# Patient Record
Sex: FEMALE | Race: WHITE | NOT HISPANIC OR LATINO | Employment: OTHER | URBAN - METROPOLITAN AREA
[De-identification: names, ages, dates, MRNs, and addresses within clinical notes are randomized per-mention and may not be internally consistent; named-entity substitution may affect disease eponyms.]

---

## 2020-09-16 ENCOUNTER — LAB VISIT (OUTPATIENT)
Dept: LAB | Facility: HOSPITAL | Age: 70
End: 2020-09-16
Attending: FAMILY MEDICINE
Payer: COMMERCIAL

## 2020-09-16 ENCOUNTER — OFFICE VISIT (OUTPATIENT)
Dept: INTERNAL MEDICINE | Facility: CLINIC | Age: 70
End: 2020-09-16
Payer: COMMERCIAL

## 2020-09-16 VITALS
TEMPERATURE: 98 F | BODY MASS INDEX: 28.17 KG/M2 | WEIGHT: 143.5 LBS | SYSTOLIC BLOOD PRESSURE: 112 MMHG | HEART RATE: 80 BPM | DIASTOLIC BLOOD PRESSURE: 78 MMHG | HEIGHT: 60 IN

## 2020-09-16 DIAGNOSIS — E11.9 CONTROLLED TYPE 2 DIABETES MELLITUS WITHOUT COMPLICATION, WITHOUT LONG-TERM CURRENT USE OF INSULIN: Primary | ICD-10-CM

## 2020-09-16 DIAGNOSIS — E11.69 DYSLIPIDEMIA ASSOCIATED WITH TYPE 2 DIABETES MELLITUS: ICD-10-CM

## 2020-09-16 DIAGNOSIS — E11.9 CONTROLLED TYPE 2 DIABETES MELLITUS WITHOUT COMPLICATION, WITHOUT LONG-TERM CURRENT USE OF INSULIN: ICD-10-CM

## 2020-09-16 DIAGNOSIS — Z12.39 BREAST CANCER SCREENING: ICD-10-CM

## 2020-09-16 DIAGNOSIS — E78.5 DYSLIPIDEMIA ASSOCIATED WITH TYPE 2 DIABETES MELLITUS: ICD-10-CM

## 2020-09-16 DIAGNOSIS — E55.9 VITAMIN D DEFICIENCY: ICD-10-CM

## 2020-09-16 DIAGNOSIS — Z11.59 NEED FOR HEPATITIS C SCREENING TEST: ICD-10-CM

## 2020-09-16 LAB
ALBUMIN/CREAT UR: 17.3 UG/MG (ref 0–30)
CREAT UR-MCNC: 139 MG/DL (ref 15–325)
MICROALBUMIN UR DL<=1MG/L-MCNC: 24 UG/ML

## 2020-09-16 PROCEDURE — 1100F PR PT FALLS ASSESS DOC 2+ FALLS/FALL W/INJURY/YR: ICD-10-PCS | Mod: CPTII,S$GLB,, | Performed by: FAMILY MEDICINE

## 2020-09-16 PROCEDURE — 99999 PR PBB SHADOW E&M-NEW PATIENT-LVL IV: ICD-10-PCS | Mod: PBBFAC,,, | Performed by: FAMILY MEDICINE

## 2020-09-16 PROCEDURE — 1159F PR MEDICATION LIST DOCUMENTED IN MEDICAL RECORD: ICD-10-PCS | Mod: S$GLB,,, | Performed by: FAMILY MEDICINE

## 2020-09-16 PROCEDURE — 82043 UR ALBUMIN QUANTITATIVE: CPT

## 2020-09-16 PROCEDURE — 99204 PR OFFICE/OUTPT VISIT, NEW, LEVL IV, 45-59 MIN: ICD-10-PCS | Mod: S$GLB,,, | Performed by: FAMILY MEDICINE

## 2020-09-16 PROCEDURE — 3288F PR FALLS RISK ASSESSMENT DOCUMENTED: ICD-10-PCS | Mod: CPTII,S$GLB,, | Performed by: FAMILY MEDICINE

## 2020-09-16 PROCEDURE — 3008F BODY MASS INDEX DOCD: CPT | Mod: CPTII,S$GLB,, | Performed by: FAMILY MEDICINE

## 2020-09-16 PROCEDURE — 1125F PR PAIN SEVERITY QUANTIFIED, PAIN PRESENT: ICD-10-PCS | Mod: S$GLB,,, | Performed by: FAMILY MEDICINE

## 2020-09-16 PROCEDURE — 1159F MED LIST DOCD IN RCRD: CPT | Mod: S$GLB,,, | Performed by: FAMILY MEDICINE

## 2020-09-16 PROCEDURE — 3008F PR BODY MASS INDEX (BMI) DOCUMENTED: ICD-10-PCS | Mod: CPTII,S$GLB,, | Performed by: FAMILY MEDICINE

## 2020-09-16 PROCEDURE — 99999 PR PBB SHADOW E&M-NEW PATIENT-LVL IV: CPT | Mod: PBBFAC,,, | Performed by: FAMILY MEDICINE

## 2020-09-16 PROCEDURE — 3288F FALL RISK ASSESSMENT DOCD: CPT | Mod: CPTII,S$GLB,, | Performed by: FAMILY MEDICINE

## 2020-09-16 PROCEDURE — 1125F AMNT PAIN NOTED PAIN PRSNT: CPT | Mod: S$GLB,,, | Performed by: FAMILY MEDICINE

## 2020-09-16 PROCEDURE — 1100F PTFALLS ASSESS-DOCD GE2>/YR: CPT | Mod: CPTII,S$GLB,, | Performed by: FAMILY MEDICINE

## 2020-09-16 PROCEDURE — 99204 OFFICE O/P NEW MOD 45 MIN: CPT | Mod: S$GLB,,, | Performed by: FAMILY MEDICINE

## 2020-09-16 RX ORDER — ATORVASTATIN CALCIUM 20 MG/1
20 TABLET, FILM COATED ORAL NIGHTLY
Qty: 90 TABLET | Refills: 3 | Status: SHIPPED | OUTPATIENT
Start: 2020-09-16 | End: 2021-09-22

## 2020-09-16 RX ORDER — ATORVASTATIN CALCIUM 20 MG/1
20 TABLET, FILM COATED ORAL NIGHTLY
COMMUNITY
End: 2020-09-16 | Stop reason: SDUPTHER

## 2020-09-16 RX ORDER — TRAZODONE HYDROCHLORIDE 100 MG/1
100 TABLET ORAL NIGHTLY PRN
Qty: 90 TABLET | Refills: 3 | Status: SHIPPED | OUTPATIENT
Start: 2020-09-16 | End: 2021-04-20 | Stop reason: SDUPTHER

## 2020-09-16 RX ORDER — REPAGLINIDE 0.5 MG/1
0.5 TABLET ORAL
Qty: 270 TABLET | Refills: 3 | Status: SHIPPED | OUTPATIENT
Start: 2020-09-16 | End: 2021-03-16

## 2020-09-16 RX ORDER — TRAZODONE HYDROCHLORIDE 100 MG/1
100 TABLET ORAL NIGHTLY
COMMUNITY
End: 2020-09-16 | Stop reason: SDUPTHER

## 2020-09-16 RX ORDER — REPAGLINIDE 0.5 MG/1
0.5 TABLET ORAL
COMMUNITY
End: 2020-09-16 | Stop reason: SDUPTHER

## 2020-09-16 NOTE — PROGRESS NOTES
Subjective:      Patient ID: Josiah Perkins is a 70 y.o. female.    Chief Complaint:  DM/dyslipidemia    HPI  69 yo with DM, dyslipidemia, sleep disturbance here to establish care.  Moved from [place].  Has been stable on meds, last a1C in 6 range.  She needs updated labs, Mammo, eye exam and med refills.  She is feeling well, no problems/concerns.  Help to care for her mother who is 90 and has Alzheimers with sudden onset/worsening.  No CP/SOB  Has some gallbladder dysfunction and will occ have loose stool/increase gas depending on what she eats.  Planning to start walking more/getting more active once she is settled in her home.    Past Medical History:   Diagnosis Date    Diabetes mellitus, type 2     Hyperlipidemia      Family History   Problem Relation Age of Onset    Alzheimer's disease Mother     Hyperlipidemia Mother     Heart disease Maternal Grandmother     Colon cancer Maternal Grandfather     Heart disease Paternal Grandmother     Thyroid cancer Sister     Heart failure Sister          at 42    Hodgkin's lymphoma Sister     Breast cancer Maternal Aunt     Breast cancer Maternal Aunt     Diabetes Neg Hx      Past Surgical History:   Procedure Laterality Date    APPENDECTOMY       SECTION      x 2    HYSTERECTOMY      SINUS SURGERY      TONSILLECTOMY       Social History     Tobacco Use    Smoking status: Never Smoker    Smokeless tobacco: Never Used   Substance Use Topics    Alcohol use: Yes     Frequency: Monthly or less     Drinks per session: 1 or 2    Drug use: Never       /78   Pulse 80   Temp 97.7 °F (36.5 °C) (Temporal)   Ht 5' (1.524 m)   Wt 65.1 kg (143 lb 8.3 oz)   BMI 28.03 kg/m²     Review of Systems   Constitutional: Negative for activity change, appetite change, chills, diaphoresis, fatigue, fever and unexpected weight change.   HENT: Negative for ear pain, hearing loss, postnasal drip, rhinorrhea and tinnitus.    Eyes: Negative for visual disturbance.    Respiratory: Negative for cough, shortness of breath and wheezing.    Cardiovascular: Negative for chest pain, palpitations and leg swelling.   Gastrointestinal: Negative for abdominal distention and abdominal pain.        Loose stool/gas   Genitourinary: Negative for dysuria, frequency, hematuria and urgency.   Musculoskeletal: Negative for back pain and joint swelling.   Neurological: Negative for weakness and headaches.   Hematological: Negative for adenopathy.       Objective:     Physical Exam  Vitals signs and nursing note reviewed.   Constitutional:       General: She is not in acute distress.     Appearance: She is well-developed.   HENT:      Right Ear: External ear normal.      Left Ear: External ear normal.      Nose: Nose normal.   Eyes:      Conjunctiva/sclera: Conjunctivae normal.      Pupils: Pupils are equal, round, and reactive to light.   Neck:      Musculoskeletal: Normal range of motion and neck supple.      Thyroid: No thyromegaly.   Cardiovascular:      Rate and Rhythm: Normal rate and regular rhythm.      Pulses:           Dorsalis pedis pulses are 3+ on the right side and 3+ on the left side.        Posterior tibial pulses are 3+ on the right side and 3+ on the left side.      Heart sounds: Normal heart sounds. No murmur. No gallop.    Pulmonary:      Effort: Pulmonary effort is normal. No respiratory distress.      Breath sounds: Normal breath sounds. No wheezing or rales.   Abdominal:      General: Bowel sounds are normal. There is no distension.      Palpations: Abdomen is soft.      Tenderness: There is no abdominal tenderness. There is no guarding.   Musculoskeletal:      Right lower leg: No edema.      Left lower leg: No edema.   Feet:      Right foot:      Protective Sensation: 6 sites tested. 6 sites sensed.      Skin integrity: No ulcer, blister or skin breakdown.      Left foot:      Protective Sensation: 6 sites tested. 6 sites sensed.      Skin integrity: No ulcer, blister or  skin breakdown.   Skin:     General: Skin is warm and dry.      Findings: No rash.   Neurological:      Mental Status: She is alert and oriented to person, place, and time.      Cranial Nerves: No cranial nerve deficit.      Sensory: No sensory deficit.   Psychiatric:         Mood and Affect: Mood normal.         Behavior: Behavior normal.         Thought Content: Thought content normal.         Judgment: Judgment normal.         No results found for: WBC, HGB, HCT, PLT, CHOL, TRIG, HDL, LDLDIRECT, ALT, AST, NA, K, CL, CREATININE, BUN, CO2, TSH, PSA, INR, GLUF, HGBA1C, MICROALBUR    Assessment:     1. Controlled type 2 diabetes mellitus without complication, without long-term current use of insulin    2. Dyslipidemia associated with type 2 diabetes mellitus    3. Breast cancer screening    4. Need for hepatitis C screening test    5. Vitamin D deficiency         Plan:     Controlled type 2 diabetes mellitus without complication, without long-term current use of insulin  -     Hemoglobin A1C; Future; Expected date: 09/16/2020  -     Microalbumin/creatinine urine ratio; Future; Expected date: 09/16/2020  -     Ambulatory referral/consult to Optometry; Future; Expected date: 09/23/2020    Dyslipidemia associated with type 2 diabetes mellitus  -     CBC auto differential; Future; Expected date: 09/16/2020  -     Comprehensive metabolic panel; Future; Expected date: 09/16/2020  -     Lipid Panel; Future; Expected date: 09/16/2020  -     TSH; Future; Expected date: 09/16/2020    Breast cancer screening  -     Mammo Digital Screening Bilat w/ Shahid; Future; Expected date: 09/16/2020    Need for hepatitis C screening test  -     Hepatitis C Antibody; Future; Expected date: 09/16/2020    Vitamin D deficiency  -     Vitamin D; Future; Expected date: 09/16/2020    Other orders  -     atorvastatin (LIPITOR) 20 MG tablet; Take 1 tablet (20 mg total) by mouth every evening.  Dispense: 90 tablet; Refill: 3  -     traZODone  (DESYREL) 100 MG tablet; Take 1 tablet (100 mg total) by mouth nightly as needed for Insomnia.  Dispense: 90 tablet; Refill: 3  -     repaglinide (PRANDIN) 0.5 MG tablet; Take 1 tablet (0.5 mg total) by mouth 3 (three) times daily before meals.  Dispense: 270 tablet; Refill: 3      Update labs  BP normal//no ACE/ARB.  Will check urine cr/abl  Med refills  Update eye exam and mammo  May need DEXA  F/u 6 mos  Get records of shots/colonoscopy

## 2020-09-17 ENCOUNTER — LAB VISIT (OUTPATIENT)
Dept: LAB | Facility: HOSPITAL | Age: 70
End: 2020-09-17
Attending: FAMILY MEDICINE
Payer: COMMERCIAL

## 2020-09-17 DIAGNOSIS — Z11.59 NEED FOR HEPATITIS C SCREENING TEST: ICD-10-CM

## 2020-09-17 DIAGNOSIS — E78.5 DYSLIPIDEMIA ASSOCIATED WITH TYPE 2 DIABETES MELLITUS: ICD-10-CM

## 2020-09-17 DIAGNOSIS — E11.69 DYSLIPIDEMIA ASSOCIATED WITH TYPE 2 DIABETES MELLITUS: ICD-10-CM

## 2020-09-17 DIAGNOSIS — E55.9 VITAMIN D DEFICIENCY: ICD-10-CM

## 2020-09-17 DIAGNOSIS — E11.9 CONTROLLED TYPE 2 DIABETES MELLITUS WITHOUT COMPLICATION, WITHOUT LONG-TERM CURRENT USE OF INSULIN: ICD-10-CM

## 2020-09-17 LAB
25(OH)D3+25(OH)D2 SERPL-MCNC: 33 NG/ML (ref 30–96)
ALBUMIN SERPL BCP-MCNC: 4.3 G/DL (ref 3.5–5.2)
ALP SERPL-CCNC: 90 U/L (ref 55–135)
ALT SERPL W/O P-5'-P-CCNC: 27 U/L (ref 10–44)
ANION GAP SERPL CALC-SCNC: 11 MMOL/L (ref 8–16)
AST SERPL-CCNC: 22 U/L (ref 10–40)
BASOPHILS # BLD AUTO: 0.06 K/UL (ref 0–0.2)
BASOPHILS NFR BLD: 0.6 % (ref 0–1.9)
BILIRUB SERPL-MCNC: 0.5 MG/DL (ref 0.1–1)
BUN SERPL-MCNC: 14 MG/DL (ref 8–23)
CALCIUM SERPL-MCNC: 9.4 MG/DL (ref 8.7–10.5)
CHLORIDE SERPL-SCNC: 102 MMOL/L (ref 95–110)
CHOLEST SERPL-MCNC: 168 MG/DL (ref 120–199)
CHOLEST/HDLC SERPL: 2.7 {RATIO} (ref 2–5)
CO2 SERPL-SCNC: 26 MMOL/L (ref 23–29)
CREAT SERPL-MCNC: 1 MG/DL (ref 0.5–1.4)
DIFFERENTIAL METHOD: ABNORMAL
EOSINOPHIL # BLD AUTO: 0.2 K/UL (ref 0–0.5)
EOSINOPHIL NFR BLD: 2.1 % (ref 0–8)
ERYTHROCYTE [DISTWIDTH] IN BLOOD BY AUTOMATED COUNT: 12.6 % (ref 11.5–14.5)
EST. GFR  (AFRICAN AMERICAN): >60 ML/MIN/1.73 M^2
EST. GFR  (NON AFRICAN AMERICAN): 57.6 ML/MIN/1.73 M^2
GLUCOSE SERPL-MCNC: 183 MG/DL (ref 70–110)
HCT VFR BLD AUTO: 42.9 % (ref 37–48.5)
HDLC SERPL-MCNC: 63 MG/DL (ref 40–75)
HDLC SERPL: 37.5 % (ref 20–50)
HGB BLD-MCNC: 13.7 G/DL (ref 12–16)
IMM GRANULOCYTES # BLD AUTO: 0.02 K/UL (ref 0–0.04)
IMM GRANULOCYTES NFR BLD AUTO: 0.2 % (ref 0–0.5)
LDLC SERPL CALC-MCNC: 74.4 MG/DL (ref 63–159)
LYMPHOCYTES # BLD AUTO: 5 K/UL (ref 1–4.8)
LYMPHOCYTES NFR BLD: 54 % (ref 18–48)
MCH RBC QN AUTO: 31.1 PG (ref 27–31)
MCHC RBC AUTO-ENTMCNC: 31.9 G/DL (ref 32–36)
MCV RBC AUTO: 98 FL (ref 82–98)
MONOCYTES # BLD AUTO: 0.7 K/UL (ref 0.3–1)
MONOCYTES NFR BLD: 7.9 % (ref 4–15)
NEUTROPHILS # BLD AUTO: 3.3 K/UL (ref 1.8–7.7)
NEUTROPHILS NFR BLD: 35.2 % (ref 38–73)
NONHDLC SERPL-MCNC: 105 MG/DL
NRBC BLD-RTO: 0 /100 WBC
PLATELET # BLD AUTO: 275 K/UL (ref 150–350)
PMV BLD AUTO: 11 FL (ref 9.2–12.9)
POTASSIUM SERPL-SCNC: 4.4 MMOL/L (ref 3.5–5.1)
PROT SERPL-MCNC: 7.2 G/DL (ref 6–8.4)
RBC # BLD AUTO: 4.4 M/UL (ref 4–5.4)
SODIUM SERPL-SCNC: 139 MMOL/L (ref 136–145)
TRIGL SERPL-MCNC: 153 MG/DL (ref 30–150)
TSH SERPL DL<=0.005 MIU/L-ACNC: 3.1 UIU/ML (ref 0.4–4)
WBC # BLD AUTO: 9.24 K/UL (ref 3.9–12.7)

## 2020-09-17 PROCEDURE — 80061 LIPID PANEL: CPT

## 2020-09-17 PROCEDURE — 86803 HEPATITIS C AB TEST: CPT

## 2020-09-17 PROCEDURE — 82306 VITAMIN D 25 HYDROXY: CPT

## 2020-09-17 PROCEDURE — 36415 COLL VENOUS BLD VENIPUNCTURE: CPT | Mod: PO

## 2020-09-17 PROCEDURE — 80053 COMPREHEN METABOLIC PANEL: CPT

## 2020-09-17 PROCEDURE — 84443 ASSAY THYROID STIM HORMONE: CPT

## 2020-09-17 PROCEDURE — 85025 COMPLETE CBC W/AUTO DIFF WBC: CPT

## 2020-09-17 PROCEDURE — 83036 HEMOGLOBIN GLYCOSYLATED A1C: CPT

## 2020-09-18 ENCOUNTER — PATIENT MESSAGE (OUTPATIENT)
Dept: INTERNAL MEDICINE | Facility: CLINIC | Age: 70
End: 2020-09-18

## 2020-09-18 DIAGNOSIS — N18.30 CKD (CHRONIC KIDNEY DISEASE) STAGE 3, GFR 30-59 ML/MIN: ICD-10-CM

## 2020-09-18 DIAGNOSIS — E11.65 UNCONTROLLED TYPE 2 DIABETES MELLITUS WITH HYPERGLYCEMIA: Primary | ICD-10-CM

## 2020-09-18 LAB
ESTIMATED AVG GLUCOSE: 169 MG/DL (ref 68–131)
HBA1C MFR BLD HPLC: 7.5 % (ref 4–5.6)
HCV AB SERPL QL IA: NEGATIVE

## 2020-09-24 ENCOUNTER — PATIENT MESSAGE (OUTPATIENT)
Dept: INTERNAL MEDICINE | Facility: CLINIC | Age: 70
End: 2020-09-24

## 2020-09-25 ENCOUNTER — PATIENT OUTREACH (OUTPATIENT)
Dept: ADMINISTRATIVE | Facility: HOSPITAL | Age: 70
End: 2020-09-25

## 2020-09-25 NOTE — PROGRESS NOTES
PARKER uploaded and faxed to [facility] [phone] for Dexa Scan & colonoscopy.

## 2020-10-07 ENCOUNTER — OFFICE VISIT (OUTPATIENT)
Dept: OPHTHALMOLOGY | Facility: CLINIC | Age: 70
End: 2020-10-07
Payer: COMMERCIAL

## 2020-10-07 DIAGNOSIS — E11.9 CONTROLLED TYPE 2 DIABETES MELLITUS WITHOUT COMPLICATION, WITHOUT LONG-TERM CURRENT USE OF INSULIN: Primary | ICD-10-CM

## 2020-10-07 DIAGNOSIS — H43.812 PVD (POSTERIOR VITREOUS DETACHMENT), LEFT EYE: ICD-10-CM

## 2020-10-07 DIAGNOSIS — Z96.1 PSEUDOPHAKIA OF BOTH EYES: ICD-10-CM

## 2020-10-07 DIAGNOSIS — H52.7 REFRACTIVE DISORDER: ICD-10-CM

## 2020-10-07 PROCEDURE — 99999 PR PBB SHADOW E&M-EST. PATIENT-LVL III: ICD-10-PCS | Mod: PBBFAC,,, | Performed by: STUDENT IN AN ORGANIZED HEALTH CARE EDUCATION/TRAINING PROGRAM

## 2020-10-07 PROCEDURE — 92004 PR EYE EXAM, NEW PATIENT,COMPREHESV: ICD-10-PCS | Mod: S$GLB,,, | Performed by: STUDENT IN AN ORGANIZED HEALTH CARE EDUCATION/TRAINING PROGRAM

## 2020-10-07 PROCEDURE — 92004 COMPRE OPH EXAM NEW PT 1/>: CPT | Mod: S$GLB,,, | Performed by: STUDENT IN AN ORGANIZED HEALTH CARE EDUCATION/TRAINING PROGRAM

## 2020-10-07 PROCEDURE — 99999 PR PBB SHADOW E&M-EST. PATIENT-LVL III: CPT | Mod: PBBFAC,,, | Performed by: STUDENT IN AN ORGANIZED HEALTH CARE EDUCATION/TRAINING PROGRAM

## 2020-10-07 NOTE — LETTER
October 7, 2020      Odell Reyes MD  [id number] [place] [id number]           [facility]  [address]  [place] [address]  Phone: [phone]  Fax: [phone]          Patient: Josiah Perkins   MR Number: [id number]   YOB: 1950   Date of Visit: 10/7/2020       Dear Dr. Odell Reyes:    Thank you for referring Josiah Perkins to me for evaluation. Attached you will find relevant portions of my assessment and plan of care.    If you have questions, please do not hesitate to call me. I look forward to following Josiah Perkins along with you.    Sincerely,    Victoria Lacy MD    Enclosure  CC:  No Recipients    If you would like to receive this communication electronically, please contact [email or web address] or [phone] to request more information on [facility] access.    For providers and/or their staff who would like to refer a patient to [facility], please contact us through our one-stop-shop provider referral line, [facility], at [phone].    If you feel you have received this communication in error or would no longer like to receive these types of communications, please e-mail [email or web address]

## 2020-10-07 NOTE — PROGRESS NOTES
HPI     The patient states she is here for her annual DM eye exam. The patients   last A1C was 7.5. The patient states her eyes are doing well.    NP- Referred by     1. +DM  2. Refractive Error    Last edited by Brittaney Powell on 10/7/2020  1:40 PM. (History)            Assessment /Plan     For exam results, see Encounter Report.    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 No tears or breaks were seen after careful retinal evaluation. Discussed retinal detachment signs and symptoms including flashes of lights, floaters, perceived curtains or veils. Advised to patient to monitor visual status including increase in flashes and floaters, or the development of visual field changes including curtain and /or veils. Advised patient to RTC urgently if these symptoms occur. Explained the need for follow up exams to the patient even if there are no changes in the symptoms.          RTC in 1years  with dilation

## 2020-10-20 ENCOUNTER — HOSPITAL ENCOUNTER (OUTPATIENT)
Dept: RADIOLOGY | Facility: HOSPITAL | Age: 70
Discharge: HOME | End: 2020-10-20
Attending: FAMILY MEDICINE
Payer: COMMERCIAL

## 2020-10-20 DIAGNOSIS — Z12.39 BREAST CANCER SCREENING: ICD-10-CM

## 2020-10-20 PROCEDURE — 77063 BREAST TOMOSYNTHESIS BI: CPT | Mod: 26,,, | Performed by: RADIOLOGY

## 2020-10-20 PROCEDURE — 77067 MAMMO DIGITAL SCREENING BILAT WITH TOMO: ICD-10-PCS | Mod: 26,,, | Performed by: RADIOLOGY

## 2020-10-20 PROCEDURE — 77063 MAMMO DIGITAL SCREENING BILAT WITH TOMO: ICD-10-PCS | Mod: 26,,, | Performed by: RADIOLOGY

## 2020-10-20 PROCEDURE — 77067 SCR MAMMO BI INCL CAD: CPT | Mod: TC

## 2020-10-20 PROCEDURE — 77067 SCR MAMMO BI INCL CAD: CPT | Mod: 26,,, | Performed by: RADIOLOGY

## 2020-10-21 ENCOUNTER — IMMUNIZATION (OUTPATIENT)
Dept: FAMILY MEDICINE | Facility: CLINIC | Age: 70
End: 2020-10-21
Payer: COMMERCIAL

## 2020-10-21 PROCEDURE — 90471 IMMUNIZATION ADMIN: CPT | Mod: S$GLB,,, | Performed by: INTERNAL MEDICINE

## 2020-10-21 PROCEDURE — 90694 VACC AIIV4 NO PRSRV 0.5ML IM: CPT | Mod: S$GLB,,, | Performed by: INTERNAL MEDICINE

## 2020-10-21 PROCEDURE — 90694 FLU VACCINE - QUADRIVALENT - ADJUVANTED: ICD-10-PCS | Mod: S$GLB,,, | Performed by: INTERNAL MEDICINE

## 2020-10-21 PROCEDURE — 90471 FLU VACCINE - QUADRIVALENT - ADJUVANTED: ICD-10-PCS | Mod: S$GLB,,, | Performed by: INTERNAL MEDICINE

## 2020-10-30 ENCOUNTER — TELEPHONE (OUTPATIENT)
Dept: INTERNAL MEDICINE | Facility: CLINIC | Age: 70
End: 2020-10-30

## 2020-10-30 NOTE — TELEPHONE ENCOUNTER
----- Message from Shruthi Samano sent at 10/30/2020 12:46 PM CDT -----  Contact: Pt  .Type:  Test Results    Who Called:  Josiah  Name of Test (Lab/Mammo/Etc): mammo  Date of Test: 10/20/2020  Ordering Provider: Eric  Where the test was performed: [facility]  Would the patient rather a call back or a response via [facility]? Call back  Best Call Back Number: [phone] ([place])     Additional Information:

## 2020-10-30 NOTE — TELEPHONE ENCOUNTER
Spoke with Skylar in radiology, she said, if results are not back yet, a request was made to get report of last mammogram to compare images. Once report is received the radiologist will compare the reports and send results. I notified pt of this. She verbalized understanding.

## 2020-11-02 ENCOUNTER — TELEPHONE (OUTPATIENT)
Dept: INTERNAL MEDICINE | Facility: CLINIC | Age: 70
End: 2020-11-02

## 2020-11-02 ENCOUNTER — PATIENT MESSAGE (OUTPATIENT)
Dept: INTERNAL MEDICINE | Facility: CLINIC | Age: 70
End: 2020-11-02

## 2020-11-02 NOTE — TELEPHONE ENCOUNTER
----- Message from Oxana Cortez sent at 11/2/2020  9:43 AM CST -----  Contact: Josiah Hayward would like a call back regarding getting an order for an ultra sound. Please give her a call back at [phone].    Thanks,  Pb

## 2020-11-04 ENCOUNTER — HOSPITAL ENCOUNTER (OUTPATIENT)
Dept: RADIOLOGY | Facility: HOSPITAL | Age: 70
Discharge: HOME | End: 2020-11-04
Attending: FAMILY MEDICINE
Payer: COMMERCIAL

## 2020-11-04 DIAGNOSIS — R92.8 ABNORMAL MAMMOGRAM: ICD-10-CM

## 2020-11-04 PROCEDURE — 77061 BREAST TOMOSYNTHESIS UNI: CPT | Mod: TC,LT

## 2020-11-04 PROCEDURE — 77065 DX MAMMO INCL CAD UNI: CPT | Mod: 26,LT,, | Performed by: RADIOLOGY

## 2020-11-04 PROCEDURE — 77061 MAMMO DIGITAL DIAGNOSTIC LEFT WITH TOMO: ICD-10-PCS | Mod: 26,,, | Performed by: RADIOLOGY

## 2020-11-04 PROCEDURE — 77065 DX MAMMO INCL CAD UNI: CPT | Mod: TC,LT

## 2020-11-04 PROCEDURE — 77061 BREAST TOMOSYNTHESIS UNI: CPT | Mod: 26,,, | Performed by: RADIOLOGY

## 2020-11-04 PROCEDURE — 76642 ULTRASOUND BREAST LIMITED: CPT | Mod: TC,LT

## 2020-11-04 PROCEDURE — 76642 US BREAST LEFT LIMITED: ICD-10-PCS | Mod: 26,LT,, | Performed by: RADIOLOGY

## 2020-11-04 PROCEDURE — 77065 MAMMO DIGITAL DIAGNOSTIC LEFT WITH TOMO: ICD-10-PCS | Mod: 26,LT,, | Performed by: RADIOLOGY

## 2020-11-04 PROCEDURE — 76642 ULTRASOUND BREAST LIMITED: CPT | Mod: 26,LT,, | Performed by: RADIOLOGY

## 2020-11-05 ENCOUNTER — PATIENT MESSAGE (OUTPATIENT)
Dept: INTERNAL MEDICINE | Facility: CLINIC | Age: 70
End: 2020-11-05

## 2020-12-07 ENCOUNTER — PATIENT MESSAGE (OUTPATIENT)
Dept: INTERNAL MEDICINE | Facility: CLINIC | Age: 70
End: 2020-12-07

## 2021-01-05 ENCOUNTER — PATIENT MESSAGE (OUTPATIENT)
Dept: INTERNAL MEDICINE | Facility: CLINIC | Age: 71
End: 2021-01-05

## 2021-01-26 ENCOUNTER — PATIENT MESSAGE (OUTPATIENT)
Dept: INTERNAL MEDICINE | Facility: CLINIC | Age: 71
End: 2021-01-26

## 2021-02-08 ENCOUNTER — PATIENT MESSAGE (OUTPATIENT)
Dept: INTERNAL MEDICINE | Facility: CLINIC | Age: 71
End: 2021-02-08

## 2021-03-09 ENCOUNTER — LAB VISIT (OUTPATIENT)
Dept: LAB | Facility: HOSPITAL | Age: 71
End: 2021-03-09
Attending: FAMILY MEDICINE
Payer: COMMERCIAL

## 2021-03-09 DIAGNOSIS — N18.30 CKD (CHRONIC KIDNEY DISEASE) STAGE 3, GFR 30-59 ML/MIN: ICD-10-CM

## 2021-03-09 DIAGNOSIS — E11.65 UNCONTROLLED TYPE 2 DIABETES MELLITUS WITH HYPERGLYCEMIA: ICD-10-CM

## 2021-03-09 PROCEDURE — 80048 BASIC METABOLIC PNL TOTAL CA: CPT | Performed by: FAMILY MEDICINE

## 2021-03-09 PROCEDURE — 83036 HEMOGLOBIN GLYCOSYLATED A1C: CPT | Performed by: FAMILY MEDICINE

## 2021-03-09 PROCEDURE — 36415 COLL VENOUS BLD VENIPUNCTURE: CPT | Mod: PO | Performed by: FAMILY MEDICINE

## 2021-03-10 LAB
ANION GAP SERPL CALC-SCNC: 13 MMOL/L (ref 8–16)
BUN SERPL-MCNC: 15 MG/DL (ref 8–23)
CALCIUM SERPL-MCNC: 9.4 MG/DL (ref 8.7–10.5)
CHLORIDE SERPL-SCNC: 103 MMOL/L (ref 95–110)
CO2 SERPL-SCNC: 22 MMOL/L (ref 23–29)
CREAT SERPL-MCNC: 1 MG/DL (ref 0.5–1.4)
EST. GFR  (AFRICAN AMERICAN): >60 ML/MIN/1.73 M^2
EST. GFR  (NON AFRICAN AMERICAN): 57.6 ML/MIN/1.73 M^2
ESTIMATED AVG GLUCOSE: 177 MG/DL (ref 68–131)
GLUCOSE SERPL-MCNC: 188 MG/DL (ref 70–110)
HBA1C MFR BLD: 7.8 % (ref 4–5.6)
POTASSIUM SERPL-SCNC: 4.4 MMOL/L (ref 3.5–5.1)
SODIUM SERPL-SCNC: 138 MMOL/L (ref 136–145)

## 2021-03-16 ENCOUNTER — OFFICE VISIT (OUTPATIENT)
Dept: INTERNAL MEDICINE | Facility: CLINIC | Age: 71
End: 2021-03-16
Payer: COMMERCIAL

## 2021-03-16 ENCOUNTER — PATIENT MESSAGE (OUTPATIENT)
Dept: INTERNAL MEDICINE | Facility: CLINIC | Age: 71
End: 2021-03-16

## 2021-03-16 VITALS
TEMPERATURE: 98 F | SYSTOLIC BLOOD PRESSURE: 110 MMHG | WEIGHT: 144.81 LBS | BODY MASS INDEX: 28.43 KG/M2 | DIASTOLIC BLOOD PRESSURE: 64 MMHG | HEIGHT: 60 IN

## 2021-03-16 DIAGNOSIS — E78.5 DYSLIPIDEMIA ASSOCIATED WITH TYPE 2 DIABETES MELLITUS: ICD-10-CM

## 2021-03-16 DIAGNOSIS — E11.65 UNCONTROLLED TYPE 2 DIABETES MELLITUS WITH HYPERGLYCEMIA: Primary | ICD-10-CM

## 2021-03-16 DIAGNOSIS — E11.69 DYSLIPIDEMIA ASSOCIATED WITH TYPE 2 DIABETES MELLITUS: ICD-10-CM

## 2021-03-16 DIAGNOSIS — N18.31 STAGE 3A CHRONIC KIDNEY DISEASE: ICD-10-CM

## 2021-03-16 PROCEDURE — 3072F LOW RISK FOR RETINOPATHY: CPT | Mod: S$GLB,,, | Performed by: FAMILY MEDICINE

## 2021-03-16 PROCEDURE — 3288F FALL RISK ASSESSMENT DOCD: CPT | Mod: CPTII,S$GLB,, | Performed by: FAMILY MEDICINE

## 2021-03-16 PROCEDURE — 1125F PR PAIN SEVERITY QUANTIFIED, PAIN PRESENT: ICD-10-PCS | Mod: S$GLB,,, | Performed by: FAMILY MEDICINE

## 2021-03-16 PROCEDURE — 1159F PR MEDICATION LIST DOCUMENTED IN MEDICAL RECORD: ICD-10-PCS | Mod: S$GLB,,, | Performed by: FAMILY MEDICINE

## 2021-03-16 PROCEDURE — 1101F PT FALLS ASSESS-DOCD LE1/YR: CPT | Mod: CPTII,S$GLB,, | Performed by: FAMILY MEDICINE

## 2021-03-16 PROCEDURE — 99214 PR OFFICE/OUTPT VISIT, EST, LEVL IV, 30-39 MIN: ICD-10-PCS | Mod: S$GLB,,, | Performed by: FAMILY MEDICINE

## 2021-03-16 PROCEDURE — 3008F BODY MASS INDEX DOCD: CPT | Mod: CPTII,S$GLB,, | Performed by: FAMILY MEDICINE

## 2021-03-16 PROCEDURE — 1101F PR PT FALLS ASSESS DOC 0-1 FALLS W/OUT INJ PAST YR: ICD-10-PCS | Mod: CPTII,S$GLB,, | Performed by: FAMILY MEDICINE

## 2021-03-16 PROCEDURE — 3051F PR MOST RECENT HEMOGLOBIN A1C LEVEL 7.0 - < 8.0%: ICD-10-PCS | Mod: CPTII,S$GLB,, | Performed by: FAMILY MEDICINE

## 2021-03-16 PROCEDURE — 3072F PR LOW RISK FOR RETINOPATHY: ICD-10-PCS | Mod: S$GLB,,, | Performed by: FAMILY MEDICINE

## 2021-03-16 PROCEDURE — 3008F PR BODY MASS INDEX (BMI) DOCUMENTED: ICD-10-PCS | Mod: CPTII,S$GLB,, | Performed by: FAMILY MEDICINE

## 2021-03-16 PROCEDURE — 3288F PR FALLS RISK ASSESSMENT DOCUMENTED: ICD-10-PCS | Mod: CPTII,S$GLB,, | Performed by: FAMILY MEDICINE

## 2021-03-16 PROCEDURE — 1159F MED LIST DOCD IN RCRD: CPT | Mod: S$GLB,,, | Performed by: FAMILY MEDICINE

## 2021-03-16 PROCEDURE — 1125F AMNT PAIN NOTED PAIN PRSNT: CPT | Mod: S$GLB,,, | Performed by: FAMILY MEDICINE

## 2021-03-16 PROCEDURE — 99214 OFFICE O/P EST MOD 30 MIN: CPT | Mod: S$GLB,,, | Performed by: FAMILY MEDICINE

## 2021-03-16 PROCEDURE — 3051F HG A1C>EQUAL 7.0%<8.0%: CPT | Mod: CPTII,S$GLB,, | Performed by: FAMILY MEDICINE

## 2021-03-16 PROCEDURE — 99999 PR PBB SHADOW E&M-EST. PATIENT-LVL III: ICD-10-PCS | Mod: PBBFAC,,, | Performed by: FAMILY MEDICINE

## 2021-03-16 PROCEDURE — 99999 PR PBB SHADOW E&M-EST. PATIENT-LVL III: CPT | Mod: PBBFAC,,, | Performed by: FAMILY MEDICINE

## 2021-03-16 RX ORDER — FAMOTIDINE 20 MG/1
20 TABLET, FILM COATED ORAL 2 TIMES DAILY
COMMUNITY
End: 2021-10-04

## 2021-03-16 RX ORDER — FLUTICASONE PROPIONATE 50 MCG
2 SPRAY, SUSPENSION (ML) NASAL DAILY
Qty: 16 G | Refills: 0 | Status: SHIPPED | OUTPATIENT
Start: 2021-03-16 | End: 2023-02-08 | Stop reason: SDUPTHER

## 2021-04-20 RX ORDER — TRAZODONE HYDROCHLORIDE 100 MG/1
100 TABLET ORAL NIGHTLY PRN
Qty: 90 TABLET | Refills: 3 | Status: SHIPPED | OUTPATIENT
Start: 2021-04-20 | End: 2021-09-27

## 2021-05-01 ENCOUNTER — PATIENT MESSAGE (OUTPATIENT)
Dept: INTERNAL MEDICINE | Facility: CLINIC | Age: 71
End: 2021-05-01

## 2021-05-01 DIAGNOSIS — H53.9 VISION DISTURBANCE: Primary | ICD-10-CM

## 2021-05-04 ENCOUNTER — OFFICE VISIT (OUTPATIENT)
Dept: OPHTHALMOLOGY | Facility: CLINIC | Age: 71
End: 2021-05-04
Payer: COMMERCIAL

## 2021-05-04 ENCOUNTER — PATIENT OUTREACH (OUTPATIENT)
Dept: ADMINISTRATIVE | Facility: OTHER | Age: 71
End: 2021-05-04

## 2021-05-04 ENCOUNTER — PATIENT MESSAGE (OUTPATIENT)
Dept: INTERNAL MEDICINE | Facility: CLINIC | Age: 71
End: 2021-05-04

## 2021-05-04 DIAGNOSIS — H43.812 PVD (POSTERIOR VITREOUS DETACHMENT), LEFT EYE: Primary | ICD-10-CM

## 2021-05-04 DIAGNOSIS — H53.9 VISION DISTURBANCE: ICD-10-CM

## 2021-05-04 PROCEDURE — 99999 PR PBB SHADOW E&M-EST. PATIENT-LVL III: ICD-10-PCS | Mod: PBBFAC,,, | Performed by: OPTOMETRIST

## 2021-05-04 PROCEDURE — 99999 PR PBB SHADOW E&M-EST. PATIENT-LVL III: CPT | Mod: PBBFAC,,, | Performed by: OPTOMETRIST

## 2021-05-04 PROCEDURE — 2023F DILAT RTA XM W/O RTNOPTHY: CPT | Mod: S$GLB,,, | Performed by: OPTOMETRIST

## 2021-05-04 PROCEDURE — 2023F PR DILATED RETINAL EXAM W/O EVID OF RETINOPATHY: ICD-10-PCS | Mod: S$GLB,,, | Performed by: OPTOMETRIST

## 2021-05-04 PROCEDURE — 92004 COMPRE OPH EXAM NEW PT 1/>: CPT | Mod: S$GLB,,, | Performed by: OPTOMETRIST

## 2021-05-04 PROCEDURE — 92004 PR EYE EXAM, NEW PATIENT,COMPREHESV: ICD-10-PCS | Mod: S$GLB,,, | Performed by: OPTOMETRIST

## 2021-06-07 ENCOUNTER — PATIENT MESSAGE (OUTPATIENT)
Dept: INTERNAL MEDICINE | Facility: CLINIC | Age: 71
End: 2021-06-07

## 2021-06-10 ENCOUNTER — OFFICE VISIT (OUTPATIENT)
Dept: INTERNAL MEDICINE | Facility: CLINIC | Age: 71
End: 2021-06-10
Payer: COMMERCIAL

## 2021-06-10 DIAGNOSIS — E11.65 UNCONTROLLED TYPE 2 DIABETES MELLITUS WITH HYPERGLYCEMIA: Primary | ICD-10-CM

## 2021-06-10 PROCEDURE — 3072F LOW RISK FOR RETINOPATHY: CPT | Mod: ,,, | Performed by: FAMILY MEDICINE

## 2021-06-10 PROCEDURE — 3072F PR LOW RISK FOR RETINOPATHY: ICD-10-PCS | Mod: ,,, | Performed by: FAMILY MEDICINE

## 2021-06-10 PROCEDURE — 3051F HG A1C>EQUAL 7.0%<8.0%: CPT | Mod: CPTII,,, | Performed by: FAMILY MEDICINE

## 2021-06-10 PROCEDURE — 99214 PR OFFICE/OUTPT VISIT, EST, LEVL IV, 30-39 MIN: ICD-10-PCS | Mod: 95,,, | Performed by: FAMILY MEDICINE

## 2021-06-10 PROCEDURE — 99214 OFFICE O/P EST MOD 30 MIN: CPT | Mod: 95,,, | Performed by: FAMILY MEDICINE

## 2021-06-10 PROCEDURE — 1159F MED LIST DOCD IN RCRD: CPT | Mod: ,,, | Performed by: FAMILY MEDICINE

## 2021-06-10 PROCEDURE — 1159F PR MEDICATION LIST DOCUMENTED IN MEDICAL RECORD: ICD-10-PCS | Mod: ,,, | Performed by: FAMILY MEDICINE

## 2021-06-10 PROCEDURE — 3051F PR MOST RECENT HEMOGLOBIN A1C LEVEL 7.0 - < 8.0%: ICD-10-PCS | Mod: CPTII,,, | Performed by: FAMILY MEDICINE

## 2021-06-15 ENCOUNTER — OFFICE VISIT (OUTPATIENT)
Dept: OPHTHALMOLOGY | Facility: CLINIC | Age: 71
End: 2021-06-15
Payer: COMMERCIAL

## 2021-06-15 DIAGNOSIS — H43.812 PVD (POSTERIOR VITREOUS DETACHMENT), LEFT EYE: Primary | ICD-10-CM

## 2021-06-15 PROCEDURE — 92014 PR EYE EXAM, EST PATIENT,COMPREHESV: ICD-10-PCS | Mod: S$GLB,,, | Performed by: OPTOMETRIST

## 2021-06-15 PROCEDURE — 99999 PR PBB SHADOW E&M-EST. PATIENT-LVL II: ICD-10-PCS | Mod: PBBFAC,,, | Performed by: OPTOMETRIST

## 2021-06-15 PROCEDURE — 99999 PR PBB SHADOW E&M-EST. PATIENT-LVL II: CPT | Mod: PBBFAC,,, | Performed by: OPTOMETRIST

## 2021-06-15 PROCEDURE — 92014 COMPRE OPH EXAM EST PT 1/>: CPT | Mod: S$GLB,,, | Performed by: OPTOMETRIST

## 2021-06-15 PROCEDURE — 2023F PR DILATED RETINAL EXAM W/O EVID OF RETINOPATHY: ICD-10-PCS | Mod: S$GLB,,, | Performed by: OPTOMETRIST

## 2021-06-15 PROCEDURE — 2023F DILAT RTA XM W/O RTNOPTHY: CPT | Mod: S$GLB,,, | Performed by: OPTOMETRIST

## 2021-06-23 ENCOUNTER — LAB VISIT (OUTPATIENT)
Dept: LAB | Facility: HOSPITAL | Age: 71
End: 2021-06-23
Attending: FAMILY MEDICINE
Payer: MEDICARE

## 2021-06-23 DIAGNOSIS — E11.65 UNCONTROLLED TYPE 2 DIABETES MELLITUS WITH HYPERGLYCEMIA: ICD-10-CM

## 2021-06-23 DIAGNOSIS — N18.31 STAGE 3A CHRONIC KIDNEY DISEASE: ICD-10-CM

## 2021-06-23 LAB
ANION GAP SERPL CALC-SCNC: 11 MMOL/L (ref 8–16)
BUN SERPL-MCNC: 14 MG/DL (ref 8–23)
CALCIUM SERPL-MCNC: 9.6 MG/DL (ref 8.7–10.5)
CHLORIDE SERPL-SCNC: 106 MMOL/L (ref 95–110)
CO2 SERPL-SCNC: 23 MMOL/L (ref 23–29)
CREAT SERPL-MCNC: 0.9 MG/DL (ref 0.5–1.4)
EST. GFR  (AFRICAN AMERICAN): >60 ML/MIN/1.73 M^2
EST. GFR  (NON AFRICAN AMERICAN): >60 ML/MIN/1.73 M^2
ESTIMATED AVG GLUCOSE: 128 MG/DL (ref 68–131)
GLUCOSE SERPL-MCNC: 134 MG/DL (ref 70–110)
HBA1C MFR BLD: 6.1 % (ref 4–5.6)
POTASSIUM SERPL-SCNC: 4 MMOL/L (ref 3.5–5.1)
SODIUM SERPL-SCNC: 140 MMOL/L (ref 136–145)

## 2021-06-23 PROCEDURE — 36415 COLL VENOUS BLD VENIPUNCTURE: CPT | Mod: PO | Performed by: FAMILY MEDICINE

## 2021-06-23 PROCEDURE — 83036 HEMOGLOBIN GLYCOSYLATED A1C: CPT | Performed by: FAMILY MEDICINE

## 2021-06-23 PROCEDURE — 80048 BASIC METABOLIC PNL TOTAL CA: CPT | Performed by: FAMILY MEDICINE

## 2021-07-02 ENCOUNTER — OFFICE VISIT (OUTPATIENT)
Dept: INTERNAL MEDICINE | Facility: CLINIC | Age: 71
End: 2021-07-02
Payer: COMMERCIAL

## 2021-07-02 VITALS
HEART RATE: 72 BPM | WEIGHT: 136.25 LBS | HEIGHT: 61 IN | TEMPERATURE: 98 F | DIASTOLIC BLOOD PRESSURE: 76 MMHG | BODY MASS INDEX: 25.72 KG/M2 | SYSTOLIC BLOOD PRESSURE: 124 MMHG

## 2021-07-02 DIAGNOSIS — E11.69 DYSLIPIDEMIA ASSOCIATED WITH TYPE 2 DIABETES MELLITUS: ICD-10-CM

## 2021-07-02 DIAGNOSIS — N18.31 STAGE 3A CHRONIC KIDNEY DISEASE: ICD-10-CM

## 2021-07-02 DIAGNOSIS — E78.5 DYSLIPIDEMIA ASSOCIATED WITH TYPE 2 DIABETES MELLITUS: ICD-10-CM

## 2021-07-02 DIAGNOSIS — E11.9 CONTROLLED TYPE 2 DIABETES MELLITUS WITHOUT COMPLICATION, WITHOUT LONG-TERM CURRENT USE OF INSULIN: Primary | ICD-10-CM

## 2021-07-02 DIAGNOSIS — L98.9 SKIN LESIONS: ICD-10-CM

## 2021-07-02 PROCEDURE — 3288F FALL RISK ASSESSMENT DOCD: CPT | Mod: CPTII,S$GLB,, | Performed by: FAMILY MEDICINE

## 2021-07-02 PROCEDURE — 3008F PR BODY MASS INDEX (BMI) DOCUMENTED: ICD-10-PCS | Mod: CPTII,S$GLB,, | Performed by: FAMILY MEDICINE

## 2021-07-02 PROCEDURE — 3044F PR MOST RECENT HEMOGLOBIN A1C LEVEL <7.0%: ICD-10-PCS | Mod: CPTII,S$GLB,, | Performed by: FAMILY MEDICINE

## 2021-07-02 PROCEDURE — 3008F BODY MASS INDEX DOCD: CPT | Mod: CPTII,S$GLB,, | Performed by: FAMILY MEDICINE

## 2021-07-02 PROCEDURE — 3288F PR FALLS RISK ASSESSMENT DOCUMENTED: ICD-10-PCS | Mod: CPTII,S$GLB,, | Performed by: FAMILY MEDICINE

## 2021-07-02 PROCEDURE — 3044F HG A1C LEVEL LT 7.0%: CPT | Mod: CPTII,S$GLB,, | Performed by: FAMILY MEDICINE

## 2021-07-02 PROCEDURE — 1101F PT FALLS ASSESS-DOCD LE1/YR: CPT | Mod: CPTII,S$GLB,, | Performed by: FAMILY MEDICINE

## 2021-07-02 PROCEDURE — 1159F PR MEDICATION LIST DOCUMENTED IN MEDICAL RECORD: ICD-10-PCS | Mod: S$GLB,,, | Performed by: FAMILY MEDICINE

## 2021-07-02 PROCEDURE — 1126F AMNT PAIN NOTED NONE PRSNT: CPT | Mod: S$GLB,,, | Performed by: FAMILY MEDICINE

## 2021-07-02 PROCEDURE — 1159F MED LIST DOCD IN RCRD: CPT | Mod: S$GLB,,, | Performed by: FAMILY MEDICINE

## 2021-07-02 PROCEDURE — 1126F PR PAIN SEVERITY QUANTIFIED, NO PAIN PRESENT: ICD-10-PCS | Mod: S$GLB,,, | Performed by: FAMILY MEDICINE

## 2021-07-02 PROCEDURE — 3072F PR LOW RISK FOR RETINOPATHY: ICD-10-PCS | Mod: S$GLB,,, | Performed by: FAMILY MEDICINE

## 2021-07-02 PROCEDURE — 99214 OFFICE O/P EST MOD 30 MIN: CPT | Mod: S$GLB,,, | Performed by: FAMILY MEDICINE

## 2021-07-02 PROCEDURE — 3072F LOW RISK FOR RETINOPATHY: CPT | Mod: S$GLB,,, | Performed by: FAMILY MEDICINE

## 2021-07-02 PROCEDURE — 99999 PR PBB SHADOW E&M-EST. PATIENT-LVL IV: CPT | Mod: PBBFAC,,, | Performed by: FAMILY MEDICINE

## 2021-07-02 PROCEDURE — 1101F PR PT FALLS ASSESS DOC 0-1 FALLS W/OUT INJ PAST YR: ICD-10-PCS | Mod: CPTII,S$GLB,, | Performed by: FAMILY MEDICINE

## 2021-07-02 PROCEDURE — 99214 PR OFFICE/OUTPT VISIT, EST, LEVL IV, 30-39 MIN: ICD-10-PCS | Mod: S$GLB,,, | Performed by: FAMILY MEDICINE

## 2021-07-02 PROCEDURE — 99999 PR PBB SHADOW E&M-EST. PATIENT-LVL IV: ICD-10-PCS | Mod: PBBFAC,,, | Performed by: FAMILY MEDICINE

## 2021-07-07 ENCOUNTER — TELEPHONE (OUTPATIENT)
Dept: DERMATOLOGY | Facility: CLINIC | Age: 71
End: 2021-07-07

## 2021-07-08 ENCOUNTER — PATIENT MESSAGE (OUTPATIENT)
Dept: DERMATOLOGY | Facility: CLINIC | Age: 71
End: 2021-07-08

## 2021-07-14 ENCOUNTER — PATIENT MESSAGE (OUTPATIENT)
Dept: INTERNAL MEDICINE | Facility: CLINIC | Age: 71
End: 2021-07-14

## 2021-07-14 DIAGNOSIS — E11.9 CONTROLLED TYPE 2 DIABETES MELLITUS WITHOUT COMPLICATION, WITHOUT LONG-TERM CURRENT USE OF INSULIN: Primary | ICD-10-CM

## 2021-07-19 ENCOUNTER — OFFICE VISIT (OUTPATIENT)
Dept: DERMATOLOGY | Facility: CLINIC | Age: 71
End: 2021-07-19
Payer: COMMERCIAL

## 2021-07-19 DIAGNOSIS — L98.9 SKIN LESIONS: ICD-10-CM

## 2021-07-19 DIAGNOSIS — D48.5 NEOPLASM OF UNCERTAIN BEHAVIOR OF SKIN: ICD-10-CM

## 2021-07-19 DIAGNOSIS — D22.9 MULTIPLE NEVI: Primary | ICD-10-CM

## 2021-07-19 DIAGNOSIS — L70.0 ACNE VULGARIS: ICD-10-CM

## 2021-07-19 PROCEDURE — 1126F AMNT PAIN NOTED NONE PRSNT: CPT | Mod: CPTII,S$GLB,, | Performed by: DERMATOLOGY

## 2021-07-19 PROCEDURE — 11102 PR TANGENTIAL BIOPSY, SKIN, SINGLE LESION: ICD-10-PCS | Mod: S$GLB,,, | Performed by: DERMATOLOGY

## 2021-07-19 PROCEDURE — 99203 OFFICE O/P NEW LOW 30 MIN: CPT | Mod: 25,S$GLB,, | Performed by: DERMATOLOGY

## 2021-07-19 PROCEDURE — 11103 PR TANGENTIAL BIOPSY, SKIN, EA ADDTL LESION: ICD-10-PCS | Mod: S$GLB,,, | Performed by: DERMATOLOGY

## 2021-07-19 PROCEDURE — 3044F HG A1C LEVEL LT 7.0%: CPT | Mod: CPTII,S$GLB,, | Performed by: DERMATOLOGY

## 2021-07-19 PROCEDURE — 11102 TANGNTL BX SKIN SINGLE LES: CPT | Mod: S$GLB,,, | Performed by: DERMATOLOGY

## 2021-07-19 PROCEDURE — 88305 TISSUE EXAM BY PATHOLOGIST: ICD-10-PCS | Mod: 26,,, | Performed by: PATHOLOGY

## 2021-07-19 PROCEDURE — 1159F PR MEDICATION LIST DOCUMENTED IN MEDICAL RECORD: ICD-10-PCS | Mod: CPTII,S$GLB,, | Performed by: DERMATOLOGY

## 2021-07-19 PROCEDURE — 99203 PR OFFICE/OUTPT VISIT, NEW, LEVL III, 30-44 MIN: ICD-10-PCS | Mod: 25,S$GLB,, | Performed by: DERMATOLOGY

## 2021-07-19 PROCEDURE — 99999 PR PBB SHADOW E&M-EST. PATIENT-LVL IV: CPT | Mod: PBBFAC,,, | Performed by: DERMATOLOGY

## 2021-07-19 PROCEDURE — 1126F PR PAIN SEVERITY QUANTIFIED, NO PAIN PRESENT: ICD-10-PCS | Mod: CPTII,S$GLB,, | Performed by: DERMATOLOGY

## 2021-07-19 PROCEDURE — 1101F PT FALLS ASSESS-DOCD LE1/YR: CPT | Mod: CPTII,S$GLB,, | Performed by: DERMATOLOGY

## 2021-07-19 PROCEDURE — 88305 TISSUE EXAM BY PATHOLOGIST: CPT | Mod: 26,,, | Performed by: PATHOLOGY

## 2021-07-19 PROCEDURE — 3288F PR FALLS RISK ASSESSMENT DOCUMENTED: ICD-10-PCS | Mod: CPTII,S$GLB,, | Performed by: DERMATOLOGY

## 2021-07-19 PROCEDURE — 11103 TANGNTL BX SKIN EA SEP/ADDL: CPT | Mod: S$GLB,,, | Performed by: DERMATOLOGY

## 2021-07-19 PROCEDURE — 3044F PR MOST RECENT HEMOGLOBIN A1C LEVEL <7.0%: ICD-10-PCS | Mod: CPTII,S$GLB,, | Performed by: DERMATOLOGY

## 2021-07-19 PROCEDURE — 3072F PR LOW RISK FOR RETINOPATHY: ICD-10-PCS | Mod: CPTII,S$GLB,, | Performed by: DERMATOLOGY

## 2021-07-19 PROCEDURE — 3288F FALL RISK ASSESSMENT DOCD: CPT | Mod: CPTII,S$GLB,, | Performed by: DERMATOLOGY

## 2021-07-19 PROCEDURE — 99999 PR PBB SHADOW E&M-EST. PATIENT-LVL IV: ICD-10-PCS | Mod: PBBFAC,,, | Performed by: DERMATOLOGY

## 2021-07-19 PROCEDURE — 1101F PR PT FALLS ASSESS DOC 0-1 FALLS W/OUT INJ PAST YR: ICD-10-PCS | Mod: CPTII,S$GLB,, | Performed by: DERMATOLOGY

## 2021-07-19 PROCEDURE — 1159F MED LIST DOCD IN RCRD: CPT | Mod: CPTII,S$GLB,, | Performed by: DERMATOLOGY

## 2021-07-19 PROCEDURE — 3072F LOW RISK FOR RETINOPATHY: CPT | Mod: CPTII,S$GLB,, | Performed by: DERMATOLOGY

## 2021-07-19 PROCEDURE — 88305 TISSUE EXAM BY PATHOLOGIST: CPT | Performed by: PATHOLOGY

## 2021-07-19 RX ORDER — TRETINOIN 0.5 MG/G
CREAM TOPICAL
Qty: 20 G | Refills: 6 | Status: SHIPPED | OUTPATIENT
Start: 2021-07-19 | End: 2022-07-19

## 2021-07-28 ENCOUNTER — PATIENT MESSAGE (OUTPATIENT)
Dept: DERMATOLOGY | Facility: CLINIC | Age: 71
End: 2021-07-28

## 2021-08-05 LAB
FINAL PATHOLOGIC DIAGNOSIS: NORMAL
GROSS: NORMAL
Lab: NORMAL

## 2021-08-26 ENCOUNTER — PATIENT MESSAGE (OUTPATIENT)
Dept: INTERNAL MEDICINE | Facility: CLINIC | Age: 71
End: 2021-08-26

## 2021-08-27 ENCOUNTER — OFFICE VISIT (OUTPATIENT)
Dept: URGENT CARE | Facility: CLINIC | Age: 71
End: 2021-08-27
Payer: MEDICARE

## 2021-08-27 VITALS
WEIGHT: 136 LBS | BODY MASS INDEX: 25.68 KG/M2 | TEMPERATURE: 98 F | RESPIRATION RATE: 16 BRPM | SYSTOLIC BLOOD PRESSURE: 138 MMHG | OXYGEN SATURATION: 100 % | HEART RATE: 80 BPM | DIASTOLIC BLOOD PRESSURE: 67 MMHG | HEIGHT: 61 IN

## 2021-08-27 DIAGNOSIS — R21 RASH: Primary | ICD-10-CM

## 2021-08-27 DIAGNOSIS — R53.83 FATIGUE, UNSPECIFIED TYPE: ICD-10-CM

## 2021-08-27 LAB
CTP QC/QA: YES
SARS-COV-2 RDRP RESP QL NAA+PROBE: NEGATIVE

## 2021-08-27 PROCEDURE — 87635: ICD-10-PCS | Mod: QW,S$GLB,, | Performed by: EMERGENCY MEDICINE

## 2021-08-27 PROCEDURE — 1125F AMNT PAIN NOTED PAIN PRSNT: CPT | Mod: CPTII,S$GLB,, | Performed by: EMERGENCY MEDICINE

## 2021-08-27 PROCEDURE — 87635 SARS-COV-2 COVID-19 AMP PRB: CPT | Mod: QW,S$GLB,, | Performed by: EMERGENCY MEDICINE

## 2021-08-27 PROCEDURE — 3044F PR MOST RECENT HEMOGLOBIN A1C LEVEL <7.0%: ICD-10-PCS | Mod: CPTII,S$GLB,, | Performed by: EMERGENCY MEDICINE

## 2021-08-27 PROCEDURE — 3075F SYST BP GE 130 - 139MM HG: CPT | Mod: CPTII,S$GLB,, | Performed by: EMERGENCY MEDICINE

## 2021-08-27 PROCEDURE — 1160F PR REVIEW ALL MEDS BY PRESCRIBER/CLIN PHARMACIST DOCUMENTED: ICD-10-PCS | Mod: CPTII,S$GLB,, | Performed by: EMERGENCY MEDICINE

## 2021-08-27 PROCEDURE — 3072F PR LOW RISK FOR RETINOPATHY: ICD-10-PCS | Mod: CPTII,S$GLB,, | Performed by: EMERGENCY MEDICINE

## 2021-08-27 PROCEDURE — 1159F MED LIST DOCD IN RCRD: CPT | Mod: CPTII,S$GLB,, | Performed by: EMERGENCY MEDICINE

## 2021-08-27 PROCEDURE — 3078F PR MOST RECENT DIASTOLIC BLOOD PRESSURE < 80 MM HG: ICD-10-PCS | Mod: CPTII,S$GLB,, | Performed by: EMERGENCY MEDICINE

## 2021-08-27 PROCEDURE — 3044F HG A1C LEVEL LT 7.0%: CPT | Mod: CPTII,S$GLB,, | Performed by: EMERGENCY MEDICINE

## 2021-08-27 PROCEDURE — 99213 PR OFFICE/OUTPT VISIT, EST, LEVL III, 20-29 MIN: ICD-10-PCS | Mod: S$GLB,CS,, | Performed by: EMERGENCY MEDICINE

## 2021-08-27 PROCEDURE — 3008F PR BODY MASS INDEX (BMI) DOCUMENTED: ICD-10-PCS | Mod: CPTII,S$GLB,, | Performed by: EMERGENCY MEDICINE

## 2021-08-27 PROCEDURE — 1159F PR MEDICATION LIST DOCUMENTED IN MEDICAL RECORD: ICD-10-PCS | Mod: CPTII,S$GLB,, | Performed by: EMERGENCY MEDICINE

## 2021-08-27 PROCEDURE — 3078F DIAST BP <80 MM HG: CPT | Mod: CPTII,S$GLB,, | Performed by: EMERGENCY MEDICINE

## 2021-08-27 PROCEDURE — 1160F RVW MEDS BY RX/DR IN RCRD: CPT | Mod: CPTII,S$GLB,, | Performed by: EMERGENCY MEDICINE

## 2021-08-27 PROCEDURE — 3008F BODY MASS INDEX DOCD: CPT | Mod: CPTII,S$GLB,, | Performed by: EMERGENCY MEDICINE

## 2021-08-27 PROCEDURE — 1125F PR PAIN SEVERITY QUANTIFIED, PAIN PRESENT: ICD-10-PCS | Mod: CPTII,S$GLB,, | Performed by: EMERGENCY MEDICINE

## 2021-08-27 PROCEDURE — 3075F PR MOST RECENT SYSTOLIC BLOOD PRESS GE 130-139MM HG: ICD-10-PCS | Mod: CPTII,S$GLB,, | Performed by: EMERGENCY MEDICINE

## 2021-08-27 PROCEDURE — 99213 OFFICE O/P EST LOW 20 MIN: CPT | Mod: S$GLB,CS,, | Performed by: EMERGENCY MEDICINE

## 2021-08-27 PROCEDURE — 3072F LOW RISK FOR RETINOPATHY: CPT | Mod: CPTII,S$GLB,, | Performed by: EMERGENCY MEDICINE

## 2021-09-27 ENCOUNTER — LAB VISIT (OUTPATIENT)
Dept: LAB | Facility: HOSPITAL | Age: 71
End: 2021-09-27
Attending: FAMILY MEDICINE
Payer: MEDICARE

## 2021-09-27 DIAGNOSIS — E11.69 DYSLIPIDEMIA ASSOCIATED WITH TYPE 2 DIABETES MELLITUS: ICD-10-CM

## 2021-09-27 DIAGNOSIS — E78.5 DYSLIPIDEMIA ASSOCIATED WITH TYPE 2 DIABETES MELLITUS: ICD-10-CM

## 2021-09-27 DIAGNOSIS — E11.9 CONTROLLED TYPE 2 DIABETES MELLITUS WITHOUT COMPLICATION, WITHOUT LONG-TERM CURRENT USE OF INSULIN: ICD-10-CM

## 2021-09-27 DIAGNOSIS — N18.31 STAGE 3A CHRONIC KIDNEY DISEASE: ICD-10-CM

## 2021-09-27 LAB
ALBUMIN SERPL BCP-MCNC: 4.4 G/DL (ref 3.5–5.2)
ALP SERPL-CCNC: 73 U/L (ref 55–135)
ALT SERPL W/O P-5'-P-CCNC: 24 U/L (ref 10–44)
ANION GAP SERPL CALC-SCNC: 11 MMOL/L (ref 8–16)
AST SERPL-CCNC: 16 U/L (ref 10–40)
BASOPHILS # BLD AUTO: 0.09 K/UL (ref 0–0.2)
BASOPHILS NFR BLD: 0.7 % (ref 0–1.9)
BILIRUB SERPL-MCNC: 0.7 MG/DL (ref 0.1–1)
BUN SERPL-MCNC: 19 MG/DL (ref 8–23)
CALCIUM SERPL-MCNC: 9.9 MG/DL (ref 8.7–10.5)
CHLORIDE SERPL-SCNC: 103 MMOL/L (ref 95–110)
CHOLEST SERPL-MCNC: 165 MG/DL (ref 120–199)
CHOLEST/HDLC SERPL: 2.9 {RATIO} (ref 2–5)
CO2 SERPL-SCNC: 25 MMOL/L (ref 23–29)
CREAT SERPL-MCNC: 0.9 MG/DL (ref 0.5–1.4)
DIFFERENTIAL METHOD: ABNORMAL
EOSINOPHIL # BLD AUTO: 0.1 K/UL (ref 0–0.5)
EOSINOPHIL NFR BLD: 0.9 % (ref 0–8)
ERYTHROCYTE [DISTWIDTH] IN BLOOD BY AUTOMATED COUNT: 12.9 % (ref 11.5–14.5)
EST. GFR  (AFRICAN AMERICAN): >60 ML/MIN/1.73 M^2
EST. GFR  (NON AFRICAN AMERICAN): >60 ML/MIN/1.73 M^2
ESTIMATED AVG GLUCOSE: 151 MG/DL (ref 68–131)
GLUCOSE SERPL-MCNC: 154 MG/DL (ref 70–110)
HBA1C MFR BLD: 6.9 % (ref 4–5.6)
HCT VFR BLD AUTO: 43.3 % (ref 37–48.5)
HDLC SERPL-MCNC: 57 MG/DL (ref 40–75)
HDLC SERPL: 34.5 % (ref 20–50)
HGB BLD-MCNC: 14.1 G/DL (ref 12–16)
IMM GRANULOCYTES # BLD AUTO: 0.02 K/UL (ref 0–0.04)
IMM GRANULOCYTES NFR BLD AUTO: 0.2 % (ref 0–0.5)
LDLC SERPL CALC-MCNC: 78 MG/DL (ref 63–159)
LYMPHOCYTES # BLD AUTO: 6.1 K/UL (ref 1–4.8)
LYMPHOCYTES NFR BLD: 50.8 % (ref 18–48)
MCH RBC QN AUTO: 31 PG (ref 27–31)
MCHC RBC AUTO-ENTMCNC: 32.6 G/DL (ref 32–36)
MCV RBC AUTO: 95 FL (ref 82–98)
MONOCYTES # BLD AUTO: 1.5 K/UL (ref 0.3–1)
MONOCYTES NFR BLD: 12 % (ref 4–15)
NEUTROPHILS # BLD AUTO: 4.3 K/UL (ref 1.8–7.7)
NEUTROPHILS NFR BLD: 35.4 % (ref 38–73)
NONHDLC SERPL-MCNC: 108 MG/DL
NRBC BLD-RTO: 0 /100 WBC
PLATELET # BLD AUTO: 282 K/UL (ref 150–450)
PLATELET BLD QL SMEAR: ABNORMAL
PMV BLD AUTO: 11 FL (ref 9.2–12.9)
POTASSIUM SERPL-SCNC: 3.8 MMOL/L (ref 3.5–5.1)
PROT SERPL-MCNC: 7.2 G/DL (ref 6–8.4)
RBC # BLD AUTO: 4.55 M/UL (ref 4–5.4)
SMUDGE CELLS BLD QL SMEAR: PRESENT
SODIUM SERPL-SCNC: 139 MMOL/L (ref 136–145)
TRIGL SERPL-MCNC: 150 MG/DL (ref 30–150)
TSH SERPL DL<=0.005 MIU/L-ACNC: 3.64 UIU/ML (ref 0.4–4)
WBC # BLD AUTO: 12.08 K/UL (ref 3.9–12.7)

## 2021-09-27 PROCEDURE — 36415 COLL VENOUS BLD VENIPUNCTURE: CPT | Mod: PO | Performed by: FAMILY MEDICINE

## 2021-09-27 PROCEDURE — 84443 ASSAY THYROID STIM HORMONE: CPT | Performed by: FAMILY MEDICINE

## 2021-09-27 PROCEDURE — 80061 LIPID PANEL: CPT | Performed by: FAMILY MEDICINE

## 2021-09-27 PROCEDURE — 85025 COMPLETE CBC W/AUTO DIFF WBC: CPT | Performed by: FAMILY MEDICINE

## 2021-09-27 PROCEDURE — 80053 COMPREHEN METABOLIC PANEL: CPT | Performed by: FAMILY MEDICINE

## 2021-09-27 PROCEDURE — 83036 HEMOGLOBIN GLYCOSYLATED A1C: CPT | Performed by: FAMILY MEDICINE

## 2021-09-28 ENCOUNTER — PATIENT MESSAGE (OUTPATIENT)
Dept: OPHTHALMOLOGY | Facility: CLINIC | Age: 71
End: 2021-09-28

## 2021-10-04 ENCOUNTER — TELEPHONE (OUTPATIENT)
Dept: INTERNAL MEDICINE | Facility: CLINIC | Age: 71
End: 2021-10-04

## 2021-10-04 ENCOUNTER — OFFICE VISIT (OUTPATIENT)
Dept: INTERNAL MEDICINE | Facility: CLINIC | Age: 71
End: 2021-10-04
Payer: MEDICARE

## 2021-10-04 VITALS
SYSTOLIC BLOOD PRESSURE: 134 MMHG | BODY MASS INDEX: 25.05 KG/M2 | OXYGEN SATURATION: 97 % | HEIGHT: 61 IN | DIASTOLIC BLOOD PRESSURE: 68 MMHG | TEMPERATURE: 98 F | HEART RATE: 76 BPM | WEIGHT: 132.69 LBS

## 2021-10-04 DIAGNOSIS — Z12.31 ENCOUNTER FOR SCREENING MAMMOGRAM FOR MALIGNANT NEOPLASM OF BREAST: ICD-10-CM

## 2021-10-04 DIAGNOSIS — E11.9 CONTROLLED TYPE 2 DIABETES MELLITUS WITHOUT COMPLICATION, WITHOUT LONG-TERM CURRENT USE OF INSULIN: ICD-10-CM

## 2021-10-04 DIAGNOSIS — Z00.00 ANNUAL PHYSICAL EXAM: Primary | ICD-10-CM

## 2021-10-04 DIAGNOSIS — Z13.820 OSTEOPOROSIS SCREENING: ICD-10-CM

## 2021-10-04 PROCEDURE — 99999 PR PBB SHADOW E&M-EST. PATIENT-LVL IV: CPT | Mod: PBBFAC,,, | Performed by: FAMILY MEDICINE

## 2021-10-04 PROCEDURE — 99999 PR PBB SHADOW E&M-EST. PATIENT-LVL IV: ICD-10-PCS | Mod: PBBFAC,,, | Performed by: FAMILY MEDICINE

## 2021-10-04 PROCEDURE — 3072F LOW RISK FOR RETINOPATHY: CPT | Mod: CPTII,S$GLB,, | Performed by: FAMILY MEDICINE

## 2021-10-04 PROCEDURE — 3061F NEG MICROALBUMINURIA REV: CPT | Mod: CPTII,S$GLB,, | Performed by: FAMILY MEDICINE

## 2021-10-04 PROCEDURE — 3078F DIAST BP <80 MM HG: CPT | Mod: CPTII,S$GLB,, | Performed by: FAMILY MEDICINE

## 2021-10-04 PROCEDURE — 99397 PR PREVENTIVE VISIT,EST,65 & OVER: ICD-10-PCS | Mod: 25,S$GLB,, | Performed by: FAMILY MEDICINE

## 2021-10-04 PROCEDURE — 3066F PR DOCUMENTATION OF TREATMENT FOR NEPHROPATHY: ICD-10-PCS | Mod: CPTII,S$GLB,, | Performed by: FAMILY MEDICINE

## 2021-10-04 PROCEDURE — 3075F PR MOST RECENT SYSTOLIC BLOOD PRESS GE 130-139MM HG: ICD-10-PCS | Mod: CPTII,S$GLB,, | Performed by: FAMILY MEDICINE

## 2021-10-04 PROCEDURE — 3008F BODY MASS INDEX DOCD: CPT | Mod: CPTII,S$GLB,, | Performed by: FAMILY MEDICINE

## 2021-10-04 PROCEDURE — 3288F FALL RISK ASSESSMENT DOCD: CPT | Mod: CPTII,S$GLB,, | Performed by: FAMILY MEDICINE

## 2021-10-04 PROCEDURE — 90694 VACC AIIV4 NO PRSRV 0.5ML IM: CPT | Mod: S$GLB,,, | Performed by: FAMILY MEDICINE

## 2021-10-04 PROCEDURE — 3288F PR FALLS RISK ASSESSMENT DOCUMENTED: ICD-10-PCS | Mod: CPTII,S$GLB,, | Performed by: FAMILY MEDICINE

## 2021-10-04 PROCEDURE — 3044F PR MOST RECENT HEMOGLOBIN A1C LEVEL <7.0%: ICD-10-PCS | Mod: CPTII,S$GLB,, | Performed by: FAMILY MEDICINE

## 2021-10-04 PROCEDURE — G0008 FLU VACCINE - QUADRIVALENT - ADJUVANTED: ICD-10-PCS | Mod: S$GLB,,, | Performed by: FAMILY MEDICINE

## 2021-10-04 PROCEDURE — 1101F PT FALLS ASSESS-DOCD LE1/YR: CPT | Mod: CPTII,S$GLB,, | Performed by: FAMILY MEDICINE

## 2021-10-04 PROCEDURE — 1159F PR MEDICATION LIST DOCUMENTED IN MEDICAL RECORD: ICD-10-PCS | Mod: CPTII,S$GLB,, | Performed by: FAMILY MEDICINE

## 2021-10-04 PROCEDURE — 1101F PR PT FALLS ASSESS DOC 0-1 FALLS W/OUT INJ PAST YR: ICD-10-PCS | Mod: CPTII,S$GLB,, | Performed by: FAMILY MEDICINE

## 2021-10-04 PROCEDURE — 1160F RVW MEDS BY RX/DR IN RCRD: CPT | Mod: CPTII,S$GLB,, | Performed by: FAMILY MEDICINE

## 2021-10-04 PROCEDURE — 3008F PR BODY MASS INDEX (BMI) DOCUMENTED: ICD-10-PCS | Mod: CPTII,S$GLB,, | Performed by: FAMILY MEDICINE

## 2021-10-04 PROCEDURE — 3078F PR MOST RECENT DIASTOLIC BLOOD PRESSURE < 80 MM HG: ICD-10-PCS | Mod: CPTII,S$GLB,, | Performed by: FAMILY MEDICINE

## 2021-10-04 PROCEDURE — 99397 PER PM REEVAL EST PAT 65+ YR: CPT | Mod: 25,S$GLB,, | Performed by: FAMILY MEDICINE

## 2021-10-04 PROCEDURE — 1160F PR REVIEW ALL MEDS BY PRESCRIBER/CLIN PHARMACIST DOCUMENTED: ICD-10-PCS | Mod: CPTII,S$GLB,, | Performed by: FAMILY MEDICINE

## 2021-10-04 PROCEDURE — 3075F SYST BP GE 130 - 139MM HG: CPT | Mod: CPTII,S$GLB,, | Performed by: FAMILY MEDICINE

## 2021-10-04 PROCEDURE — 1159F MED LIST DOCD IN RCRD: CPT | Mod: CPTII,S$GLB,, | Performed by: FAMILY MEDICINE

## 2021-10-04 PROCEDURE — 90694 FLU VACCINE - QUADRIVALENT - ADJUVANTED: ICD-10-PCS | Mod: S$GLB,,, | Performed by: FAMILY MEDICINE

## 2021-10-04 PROCEDURE — 3072F PR LOW RISK FOR RETINOPATHY: ICD-10-PCS | Mod: CPTII,S$GLB,, | Performed by: FAMILY MEDICINE

## 2021-10-04 PROCEDURE — 3066F NEPHROPATHY DOC TX: CPT | Mod: CPTII,S$GLB,, | Performed by: FAMILY MEDICINE

## 2021-10-04 PROCEDURE — 3044F HG A1C LEVEL LT 7.0%: CPT | Mod: CPTII,S$GLB,, | Performed by: FAMILY MEDICINE

## 2021-10-04 PROCEDURE — 3061F PR NEG MICROALBUMINURIA RESULT DOCUMENTED/REVIEW: ICD-10-PCS | Mod: CPTII,S$GLB,, | Performed by: FAMILY MEDICINE

## 2021-10-04 PROCEDURE — G0008 ADMIN INFLUENZA VIRUS VAC: HCPCS | Mod: S$GLB,,, | Performed by: FAMILY MEDICINE

## 2021-10-04 RX ORDER — PHENYLEPHRINE HCL 10 MG/1
TABLET, FILM COATED ORAL
COMMUNITY
End: 2023-11-08

## 2021-10-04 RX ORDER — DIPHENHYDRAMINE HCL 25 MG
CAPSULE ORAL
COMMUNITY

## 2021-10-04 RX ORDER — LACTOBAC NO.30/BIFIDOBACT NO.4 30B CELL
CAPSULE,DELAYED RELEASE (ENTERIC COATED) ORAL
COMMUNITY
End: 2022-05-06

## 2021-10-04 RX ORDER — FAMOTIDINE, CALCIUM CARBONATE, AND MAGNESIUM HYDROXIDE 10; 800; 165 MG/1; MG/1; MG/1
TABLET, CHEWABLE ORAL
COMMUNITY
End: 2021-10-04 | Stop reason: ALTCHOICE

## 2021-10-11 ENCOUNTER — APPOINTMENT (OUTPATIENT)
Dept: RADIOLOGY | Facility: HOSPITAL | Age: 71
End: 2021-10-11
Attending: FAMILY MEDICINE
Payer: MEDICARE

## 2021-10-11 DIAGNOSIS — Z13.820 OSTEOPOROSIS SCREENING: ICD-10-CM

## 2021-10-11 PROCEDURE — 77080 DXA BONE DENSITY AXIAL: CPT | Mod: GA,TC

## 2021-10-11 PROCEDURE — 77080 DEXA BONE DENSITY SPINE HIP: ICD-10-PCS | Mod: 26,GA,, | Performed by: RADIOLOGY

## 2021-10-11 PROCEDURE — 77080 DXA BONE DENSITY AXIAL: CPT | Mod: 26,GA,, | Performed by: RADIOLOGY

## 2021-11-09 ENCOUNTER — HOSPITAL ENCOUNTER (OUTPATIENT)
Dept: RADIOLOGY | Facility: HOSPITAL | Age: 71
Discharge: HOME | End: 2021-11-09
Attending: FAMILY MEDICINE
Payer: MEDICARE

## 2021-11-09 DIAGNOSIS — Z12.31 ENCOUNTER FOR SCREENING MAMMOGRAM FOR MALIGNANT NEOPLASM OF BREAST: ICD-10-CM

## 2021-11-09 PROCEDURE — 77067 MAMMO DIGITAL SCREENING BILAT WITH TOMO: ICD-10-PCS | Mod: 26,,, | Performed by: RADIOLOGY

## 2021-11-09 PROCEDURE — 77067 SCR MAMMO BI INCL CAD: CPT | Mod: 26,,, | Performed by: RADIOLOGY

## 2021-11-09 PROCEDURE — 77063 MAMMO DIGITAL SCREENING BILAT WITH TOMO: ICD-10-PCS | Mod: 26,,, | Performed by: RADIOLOGY

## 2021-11-09 PROCEDURE — 77067 SCR MAMMO BI INCL CAD: CPT | Mod: TC

## 2021-11-09 PROCEDURE — 77063 BREAST TOMOSYNTHESIS BI: CPT | Mod: 26,,, | Performed by: RADIOLOGY

## 2022-01-04 ENCOUNTER — PATIENT MESSAGE (OUTPATIENT)
Dept: INTERNAL MEDICINE | Facility: CLINIC | Age: 72
End: 2022-01-04
Payer: MEDICARE

## 2022-01-04 ENCOUNTER — PATIENT MESSAGE (OUTPATIENT)
Dept: ADMINISTRATIVE | Facility: OTHER | Age: 72
End: 2022-01-04
Payer: MEDICARE

## 2022-01-04 ENCOUNTER — LAB VISIT (OUTPATIENT)
Dept: PRIMARY CARE CLINIC | Facility: OTHER | Age: 72
End: 2022-01-04
Attending: INTERNAL MEDICINE
Payer: MEDICARE

## 2022-01-04 DIAGNOSIS — R05.9 COUGH: ICD-10-CM

## 2022-01-04 PROCEDURE — U0003 INFECTIOUS AGENT DETECTION BY NUCLEIC ACID (DNA OR RNA); SEVERE ACUTE RESPIRATORY SYNDROME CORONAVIRUS 2 (SARS-COV-2) (CORONAVIRUS DISEASE [COVID-19]), AMPLIFIED PROBE TECHNIQUE, MAKING USE OF HIGH THROUGHPUT TECHNOLOGIES AS DESCRIBED BY CMS-2020-01-R: HCPCS | Performed by: INTERNAL MEDICINE

## 2022-01-08 DIAGNOSIS — U07.1 COVID-19 VIRUS DETECTED: ICD-10-CM

## 2022-01-08 LAB
SARS-COV-2 RNA RESP QL NAA+PROBE: DETECTED
TEST PERFORMANCE INFO SPEC: ABNORMAL

## 2022-01-11 ENCOUNTER — NURSE TRIAGE (OUTPATIENT)
Dept: ADMINISTRATIVE | Facility: CLINIC | Age: 72
End: 2022-01-11
Payer: MEDICARE

## 2022-01-11 NOTE — TELEPHONE ENCOUNTER
Pt called with quarantine questions. Answered the questions and will call back if any problems    Reason for Disposition   Information only question and nurse able to answer    Protocols used: INFORMATION ONLY CALL - NO TRIAGE-A-[place]

## 2022-03-23 ENCOUNTER — PATIENT MESSAGE (OUTPATIENT)
Dept: RESEARCH | Facility: HOSPITAL | Age: 72
End: 2022-03-23
Payer: MEDICARE

## 2022-03-28 ENCOUNTER — LAB VISIT (OUTPATIENT)
Dept: LAB | Facility: HOSPITAL | Age: 72
End: 2022-03-28
Attending: FAMILY MEDICINE
Payer: MEDICARE

## 2022-03-28 DIAGNOSIS — E11.9 CONTROLLED TYPE 2 DIABETES MELLITUS WITHOUT COMPLICATION, WITHOUT LONG-TERM CURRENT USE OF INSULIN: ICD-10-CM

## 2022-03-28 LAB
ANION GAP SERPL CALC-SCNC: 8 MMOL/L (ref 8–16)
BUN SERPL-MCNC: 18 MG/DL (ref 8–23)
CALCIUM SERPL-MCNC: 10 MG/DL (ref 8.7–10.5)
CHLORIDE SERPL-SCNC: 105 MMOL/L (ref 95–110)
CO2 SERPL-SCNC: 28 MMOL/L (ref 23–29)
CREAT SERPL-MCNC: 0.9 MG/DL (ref 0.5–1.4)
EST. GFR  (AFRICAN AMERICAN): >60 ML/MIN/1.73 M^2
EST. GFR  (NON AFRICAN AMERICAN): >60 ML/MIN/1.73 M^2
ESTIMATED AVG GLUCOSE: 157 MG/DL (ref 68–131)
GLUCOSE SERPL-MCNC: 181 MG/DL (ref 70–110)
HBA1C MFR BLD: 7.1 % (ref 4–5.6)
POTASSIUM SERPL-SCNC: 4.3 MMOL/L (ref 3.5–5.1)
SODIUM SERPL-SCNC: 141 MMOL/L (ref 136–145)

## 2022-03-28 PROCEDURE — 36415 COLL VENOUS BLD VENIPUNCTURE: CPT | Mod: PO | Performed by: FAMILY MEDICINE

## 2022-03-28 PROCEDURE — 83036 HEMOGLOBIN GLYCOSYLATED A1C: CPT | Performed by: FAMILY MEDICINE

## 2022-03-28 PROCEDURE — 80048 BASIC METABOLIC PNL TOTAL CA: CPT | Performed by: FAMILY MEDICINE

## 2022-04-02 ENCOUNTER — PATIENT MESSAGE (OUTPATIENT)
Dept: DERMATOLOGY | Facility: CLINIC | Age: 72
End: 2022-04-02
Payer: MEDICARE

## 2022-05-06 ENCOUNTER — OFFICE VISIT (OUTPATIENT)
Dept: INTERNAL MEDICINE | Facility: CLINIC | Age: 72
End: 2022-05-06
Payer: MEDICARE

## 2022-05-06 VITALS
HEART RATE: 79 BPM | HEIGHT: 61 IN | OXYGEN SATURATION: 97 % | DIASTOLIC BLOOD PRESSURE: 64 MMHG | TEMPERATURE: 97 F | WEIGHT: 134.69 LBS | BODY MASS INDEX: 25.43 KG/M2 | SYSTOLIC BLOOD PRESSURE: 118 MMHG

## 2022-05-06 DIAGNOSIS — E11.65 UNCONTROLLED TYPE 2 DIABETES MELLITUS WITH HYPERGLYCEMIA: Primary | ICD-10-CM

## 2022-05-06 DIAGNOSIS — E11.69 DYSLIPIDEMIA ASSOCIATED WITH TYPE 2 DIABETES MELLITUS: ICD-10-CM

## 2022-05-06 DIAGNOSIS — R10.13 EPIGASTRIC PAIN: ICD-10-CM

## 2022-05-06 DIAGNOSIS — E78.5 DYSLIPIDEMIA ASSOCIATED WITH TYPE 2 DIABETES MELLITUS: ICD-10-CM

## 2022-05-06 PROCEDURE — 99999 PR PBB SHADOW E&M-EST. PATIENT-LVL IV: CPT | Mod: PBBFAC,,, | Performed by: FAMILY MEDICINE

## 2022-05-06 PROCEDURE — 99214 PR OFFICE/OUTPT VISIT, EST, LEVL IV, 30-39 MIN: ICD-10-PCS | Mod: S$GLB,,, | Performed by: FAMILY MEDICINE

## 2022-05-06 PROCEDURE — 99214 OFFICE O/P EST MOD 30 MIN: CPT | Mod: S$GLB,,, | Performed by: FAMILY MEDICINE

## 2022-05-06 PROCEDURE — 99999 PR PBB SHADOW E&M-EST. PATIENT-LVL IV: ICD-10-PCS | Mod: PBBFAC,,, | Performed by: FAMILY MEDICINE

## 2022-05-06 RX ORDER — BISMUTH SUBSALICYLATE 262 MG/1
TABLET ORAL
COMMUNITY
Start: 2022-03-01 | End: 2022-08-22

## 2022-05-06 RX ORDER — DICYCLOMINE HYDROCHLORIDE 20 MG/1
20 TABLET ORAL
Qty: 60 TABLET | Refills: 0 | Status: SHIPPED | OUTPATIENT
Start: 2022-05-06 | End: 2022-05-30

## 2022-05-06 NOTE — PROGRESS NOTES
"Subjective:      Patient ID: Josiah Perkins is a 72 y.o. female.    Chief Complaint:  F/u DM    HPI 72 y.o.   female patient with a PMHx of CKD, DM type II, and hyperlipidemia presents to clinic for follow-up. The patient was last seen on 2021      Today she reports she is doing well. Patient reports she is having stomach issues. She states it goes to her back and up to her shoulder area. Patient reports she have been gassy and experienced diarrhea as well. Patient notes it does tend to occur mostly after meals. Patient questions if you can correct the gallbladder without removing.  Was told in past her EF of gallbladder was decreased.  No fever/chills.  Walking more to get better control of DM.  Had Covid in  and was sedentary for awhile.  Patient otherwise without complaints.       Past Medical History:   Diagnosis Date    CKD (chronic kidney disease) stage 3, GFR 30-59 ml/min     Diabetes mellitus, type 2     Hyperlipidemia      Family History   Problem Relation Age of Onset    Alzheimer's disease Mother     Hyperlipidemia Mother     Heart disease Maternal Grandmother     Colon cancer Maternal Grandfather     Heart disease Paternal Grandmother     Thyroid cancer Sister     Heart failure Sister          at 42    Hodgkin's lymphoma Sister     Breast cancer Maternal Aunt     Breast cancer Maternal Aunt     Diabetes Neg Hx      Past Surgical History:   Procedure Laterality Date    APPENDECTOMY      BREAST CYST ASPIRATION Right     2019    BREAST CYST EXCISION Right     CATARACT EXTRACTION       SECTION      x 2    HYSTERECTOMY      SINUS SURGERY      TONSILLECTOMY       Social History     Tobacco Use    Smoking status: Never Smoker    Smokeless tobacco: Never Used   Substance Use Topics    Alcohol use: Yes    Drug use: Never       /64   Pulse 79   Temp 97.4 °F (36.3 °C)   Ht 5' 1" (1.549 m)   Wt 61.1 kg (134 lb 11.2 oz)   SpO2 97%   BMI 25.45 kg/m² "     Review of Systems   Constitutional: Negative for activity change, appetite change, chills, diaphoresis, fatigue, fever and unexpected weight change.   HENT: Negative for congestion, ear pain, hearing loss, postnasal drip, rhinorrhea, sinus pain and sore throat.    Eyes: Negative for pain, discharge, itching and visual disturbance.   Respiratory: Negative for cough, chest tightness, shortness of breath and wheezing.    Cardiovascular: Negative for chest pain, palpitations and leg swelling.   Gastrointestinal: Positive for abdominal pain and diarrhea. Negative for constipation, nausea and vomiting.   Endocrine: Negative for polydipsia and polyuria.   Genitourinary: Negative for difficulty urinating, dysuria, flank pain, frequency, menstrual problem, pelvic pain and urgency.   Musculoskeletal: Negative for arthralgias, back pain, joint swelling, myalgias and neck pain.   Skin: Negative for color change and rash.   Neurological: Negative for dizziness, weakness, light-headedness and headaches.   Hematological: Negative for adenopathy.   Psychiatric/Behavioral: Negative for confusion, decreased concentration and dysphoric mood.       Objective:     Physical Exam  Vitals and nursing note reviewed.   Constitutional:       General: She is not in acute distress.  HENT:      Right Ear: External ear normal.      Left Ear: External ear normal.      Nose: Nose normal.   Eyes:      Conjunctiva/sclera: Conjunctivae normal.      Pupils: Pupils are equal, round, and reactive to light.   Cardiovascular:      Rate and Rhythm: Normal rate and regular rhythm.      Heart sounds: Normal heart sounds.   Pulmonary:      Effort: Pulmonary effort is normal. No respiratory distress.      Breath sounds: Normal breath sounds. No wheezing or rales.   Abdominal:      General: Bowel sounds are normal. There is no distension.      Palpations: Abdomen is soft.      Tenderness: There is no abdominal tenderness. There is no guarding.    Musculoskeletal:      Cervical back: Normal range of motion and neck supple.      Right lower leg: No edema.      Left lower leg: No edema.   Skin:     General: Skin is warm and dry.      Findings: No rash.   Neurological:      Mental Status: She is alert and oriented to person, place, and time.   Psychiatric:         Behavior: Behavior normal.         Thought Content: Thought content normal.         Judgment: Judgment normal.         Lab Results   Component Value Date    WBC 12.08 09/27/2021    HGB 14.1 09/27/2021    HCT 43.3 09/27/2021     09/27/2021    CHOL 165 09/27/2021    TRIG 150 09/27/2021    HDL 57 09/27/2021    ALT 24 09/27/2021    AST 16 09/27/2021     03/28/2022    K 4.3 03/28/2022     03/28/2022    CREATININE 0.9 03/28/2022    BUN 18 03/28/2022    CO2 28 03/28/2022    TSH 3.641 09/27/2021    HGBA1C 7.1 (H) 03/28/2022       Assessment:     1. Uncontrolled type 2 diabetes mellitus with hyperglycemia    2. Epigastric pain    3. Dyslipidemia associated with type 2 diabetes mellitus         Plan:     Uncontrolled type 2 diabetes mellitus with hyperglycemia  -     CBC Auto Differential; Future; Expected date: 11/06/2022  -     Comprehensive Metabolic Panel; Future; Expected date: 11/06/2022  -     Hemoglobin A1C; Future; Expected date: 11/06/2022  -     Microalbumin/Creatinine Ratio, Urine; Future; Expected date: 11/06/2022    Epigastric pain  -     US Abdomen Limited; Future; Expected date: 05/06/2022    Dyslipidemia associated with type 2 diabetes mellitus  -     TSH; Future; Expected date: 11/06/2022  -     Lipid Panel; Future; Expected date: 11/06/2022    Other orders  -     dicyclomine (BENTYL) 20 mg tablet; Take 1 tablet (20 mg total) by mouth 4 (four) times daily before meals and nightly.  Dispense: 60 tablet; Refill: 0        Vitals reviewed and stable. BP within normal range at 118/64.    Patient advised it sounds like her gallbladder. She is advised she have symptoms of  gallstones, will ultrasound the area.  Prescribed bentyl to help with discomfort.  Patient informed you can not correct gallbladder. She is advised to stay away from fried/fatty food and use vinegiarette dressings for her salads.    DM stable//work on lifestyle      Questions and concerns addressed.  Follow-up in 6 months with labs.          Documentation entered by Mya Marlow, acting as scribe for Dr. Odell Aquino. 05/06/2022 2:03 PM.

## 2022-05-09 ENCOUNTER — HOSPITAL ENCOUNTER (OUTPATIENT)
Dept: RADIOLOGY | Facility: HOSPITAL | Age: 72
Discharge: HOME | End: 2022-05-09
Attending: FAMILY MEDICINE
Payer: MEDICARE

## 2022-05-09 ENCOUNTER — PES CALL (OUTPATIENT)
Dept: ADMINISTRATIVE | Facility: CLINIC | Age: 72
End: 2022-05-09
Payer: MEDICARE

## 2022-05-09 DIAGNOSIS — R10.13 EPIGASTRIC PAIN: ICD-10-CM

## 2022-05-09 PROCEDURE — 76705 ECHO EXAM OF ABDOMEN: CPT | Mod: TC

## 2022-05-09 PROCEDURE — 76705 ECHO EXAM OF ABDOMEN: CPT | Mod: 26,,, | Performed by: RADIOLOGY

## 2022-05-09 PROCEDURE — 76705 US ABDOMEN LIMITED: ICD-10-PCS | Mod: 26,,, | Performed by: RADIOLOGY

## 2022-05-10 ENCOUNTER — PATIENT MESSAGE (OUTPATIENT)
Dept: INTERNAL MEDICINE | Facility: CLINIC | Age: 72
End: 2022-05-10
Payer: MEDICARE

## 2022-05-10 DIAGNOSIS — R10.10 UPPER ABDOMINAL PAIN, UNSPECIFIED: ICD-10-CM

## 2022-05-12 ENCOUNTER — PES CALL (OUTPATIENT)
Dept: ADMINISTRATIVE | Facility: CLINIC | Age: 72
End: 2022-05-12
Payer: MEDICARE

## 2022-05-13 ENCOUNTER — PES CALL (OUTPATIENT)
Dept: ADMINISTRATIVE | Facility: CLINIC | Age: 72
End: 2022-05-13
Payer: MEDICARE

## 2022-05-18 ENCOUNTER — PATIENT MESSAGE (OUTPATIENT)
Dept: OPHTHALMOLOGY | Facility: CLINIC | Age: 72
End: 2022-05-18
Payer: MEDICARE

## 2022-05-20 ENCOUNTER — PATIENT MESSAGE (OUTPATIENT)
Dept: INTERNAL MEDICINE | Facility: CLINIC | Age: 72
End: 2022-05-20
Payer: MEDICARE

## 2022-05-30 RX ORDER — DICYCLOMINE HYDROCHLORIDE 20 MG/1
TABLET ORAL
Qty: 60 TABLET | Refills: 11 | Status: SHIPPED | OUTPATIENT
Start: 2022-05-30 | End: 2022-07-31 | Stop reason: SDUPTHER

## 2022-06-06 ENCOUNTER — PATIENT MESSAGE (OUTPATIENT)
Dept: INTERNAL MEDICINE | Facility: CLINIC | Age: 72
End: 2022-06-06
Payer: MEDICARE

## 2022-06-13 ENCOUNTER — HOSPITAL ENCOUNTER (OUTPATIENT)
Dept: RADIOLOGY | Facility: HOSPITAL | Age: 72
Discharge: HOME | End: 2022-06-13
Attending: FAMILY MEDICINE
Payer: MEDICARE

## 2022-06-13 ENCOUNTER — PATIENT MESSAGE (OUTPATIENT)
Dept: INTERNAL MEDICINE | Facility: CLINIC | Age: 72
End: 2022-06-13
Payer: MEDICARE

## 2022-06-13 DIAGNOSIS — R10.10 UPPER ABDOMINAL PAIN, UNSPECIFIED: ICD-10-CM

## 2022-06-13 PROCEDURE — 78227 HEPATOBIL SYST IMAGE W/DRUG: CPT | Mod: 26,,, | Performed by: RADIOLOGY

## 2022-06-13 PROCEDURE — A9537 TC99M MEBROFENIN: HCPCS

## 2022-06-13 PROCEDURE — 78227 NM HEPATOBILIARY(HIDA) WITH PHARM AND EF: ICD-10-PCS | Mod: 26,,, | Performed by: RADIOLOGY

## 2022-06-23 LAB
LEFT EYE DM RETINOPATHY: NEGATIVE
RIGHT EYE DM RETINOPATHY: NEGATIVE

## 2022-07-08 ENCOUNTER — PES CALL (OUTPATIENT)
Dept: ADMINISTRATIVE | Facility: CLINIC | Age: 72
End: 2022-07-08
Payer: MEDICARE

## 2022-07-27 ENCOUNTER — PES CALL (OUTPATIENT)
Dept: ADMINISTRATIVE | Facility: CLINIC | Age: 72
End: 2022-07-27
Payer: MEDICARE

## 2022-07-31 DIAGNOSIS — E11.9 CONTROLLED TYPE 2 DIABETES MELLITUS WITHOUT COMPLICATION, WITHOUT LONG-TERM CURRENT USE OF INSULIN: ICD-10-CM

## 2022-07-31 NOTE — TELEPHONE ENCOUNTER
Care Due:                  Date            Visit Type   Department     Provider  --------------------------------------------------------------------------------                                EP -                              PRIMARY      [facility] INTERNAL  Last Visit: 05-      CARE ([facility])   MEDICINE       Odell Aquino                              EP -                              PRIMARY      [facility] INTERNAL  Next Visit: 11-      CARE ([facility])   MEDICINE       Odell Aquino                                                            Last  Test          Frequency    Reason                     Performed    Due Date  --------------------------------------------------------------------------------    CMP.........  12 months..  atorvastatin.............  09- 09-    Lipid Panel.  12 months..  atorvastatin.............  09- 09-    [employer] Embedded Care Gaps. Reference number: [id number]. 7/31/2022   1:08:06 PM CDT

## 2022-08-01 RX ORDER — EMPAGLIFLOZIN 25 MG/1
25 TABLET, FILM COATED ORAL DAILY
Qty: 30 TABLET | Refills: 6 | Status: SHIPPED | OUTPATIENT
Start: 2022-08-01 | End: 2022-11-07 | Stop reason: SDUPTHER

## 2022-08-01 RX ORDER — DICYCLOMINE HYDROCHLORIDE 20 MG/1
20 TABLET ORAL
Qty: 60 TABLET | Refills: 6 | Status: SHIPPED | OUTPATIENT
Start: 2022-08-01 | End: 2022-11-28 | Stop reason: SDUPTHER

## 2022-08-05 ENCOUNTER — TELEPHONE (OUTPATIENT)
Dept: ADMINISTRATIVE | Facility: CLINIC | Age: 72
End: 2022-08-05
Payer: MEDICARE

## 2022-08-05 NOTE — TELEPHONE ENCOUNTER
Called pt; informed pt I was calling to confirm her virtual EAWV on 8/8/22 at 9:00am and to see if she needed any help; pt stated she would be out of town and needed to reschedule; appt rescheduled to 8/24/22 per pt request

## 2022-08-22 ENCOUNTER — PATIENT MESSAGE (OUTPATIENT)
Dept: ADMINISTRATIVE | Facility: CLINIC | Age: 72
End: 2022-08-22
Payer: MEDICARE

## 2022-08-22 ENCOUNTER — PATIENT MESSAGE (OUTPATIENT)
Dept: INTERNAL MEDICINE | Facility: CLINIC | Age: 72
End: 2022-08-22

## 2022-08-22 ENCOUNTER — OFFICE VISIT (OUTPATIENT)
Dept: INTERNAL MEDICINE | Facility: CLINIC | Age: 72
End: 2022-08-22
Payer: MEDICARE

## 2022-08-22 ENCOUNTER — PATIENT MESSAGE (OUTPATIENT)
Dept: DERMATOLOGY | Facility: CLINIC | Age: 72
End: 2022-08-22
Payer: MEDICARE

## 2022-08-22 ENCOUNTER — TELEPHONE (OUTPATIENT)
Dept: ADMINISTRATIVE | Facility: CLINIC | Age: 72
End: 2022-08-22
Payer: MEDICARE

## 2022-08-22 DIAGNOSIS — G47.00 INSOMNIA, UNSPECIFIED TYPE: ICD-10-CM

## 2022-08-22 DIAGNOSIS — L23.7 POISON IVY DERMATITIS: Primary | ICD-10-CM

## 2022-08-22 DIAGNOSIS — E11.65 UNCONTROLLED TYPE 2 DIABETES MELLITUS WITH HYPERGLYCEMIA: ICD-10-CM

## 2022-08-22 PROCEDURE — 3072F LOW RISK FOR RETINOPATHY: CPT | Mod: CPTII,95,, | Performed by: NURSE PRACTITIONER

## 2022-08-22 PROCEDURE — 1159F PR MEDICATION LIST DOCUMENTED IN MEDICAL RECORD: ICD-10-PCS | Mod: CPTII,95,, | Performed by: NURSE PRACTITIONER

## 2022-08-22 PROCEDURE — 99214 OFFICE O/P EST MOD 30 MIN: CPT | Mod: 95,,, | Performed by: NURSE PRACTITIONER

## 2022-08-22 PROCEDURE — 3051F HG A1C>EQUAL 7.0%<8.0%: CPT | Mod: CPTII,95,, | Performed by: NURSE PRACTITIONER

## 2022-08-22 PROCEDURE — 99214 PR OFFICE/OUTPT VISIT, EST, LEVL IV, 30-39 MIN: ICD-10-PCS | Mod: 95,,, | Performed by: NURSE PRACTITIONER

## 2022-08-22 PROCEDURE — 1160F PR REVIEW ALL MEDS BY PRESCRIBER/CLIN PHARMACIST DOCUMENTED: ICD-10-PCS | Mod: CPTII,95,, | Performed by: NURSE PRACTITIONER

## 2022-08-22 PROCEDURE — 1160F RVW MEDS BY RX/DR IN RCRD: CPT | Mod: CPTII,95,, | Performed by: NURSE PRACTITIONER

## 2022-08-22 PROCEDURE — 3051F PR MOST RECENT HEMOGLOBIN A1C LEVEL 7.0 - < 8.0%: ICD-10-PCS | Mod: CPTII,95,, | Performed by: NURSE PRACTITIONER

## 2022-08-22 PROCEDURE — 1159F MED LIST DOCD IN RCRD: CPT | Mod: CPTII,95,, | Performed by: NURSE PRACTITIONER

## 2022-08-22 PROCEDURE — 3072F PR LOW RISK FOR RETINOPATHY: ICD-10-PCS | Mod: CPTII,95,, | Performed by: NURSE PRACTITIONER

## 2022-08-22 RX ORDER — TRIAMCINOLONE ACETONIDE 1 MG/G
OINTMENT TOPICAL 2 TIMES DAILY
Qty: 80 G | Refills: 0 | Status: SHIPPED | OUTPATIENT
Start: 2022-08-22 | End: 2022-11-07

## 2022-08-22 NOTE — PROGRESS NOTES
Subjective:       Patient ID: Josiah Perkins is a 72 y.o. female.    Chief Complaint: Rash    The patient location is: home  The chief complaint leading to consultation is: rash    Visit type: audiovisual    Face to Face time with patient: 15 min  20 minutes of total time spent on the encounter, which includes face to face time and non-face to face time preparing to see the patient (eg, review of tests), Obtaining and/or reviewing separately obtained history, Documenting clinical information in the electronic or other health record, Independently interpreting results (not separately reported) and communicating results to the patient/family/caregiver, or Care coordination (not separately reported).         Each patient to whom he or she provides medical services by telemedicine is:  (1) informed of the relationship between the physician and patient and the respective role of any other health care provider with respect to management of the patient; and (2) notified that he or she may decline to receive medical services by telemedicine and may withdraw from such care at any time.    Notes:   Patient doing virtual visit for poison ivy  Did some work in grandson's flower beds 2 weeks ago  Rash started 2-3 days after  Started on left leg then progressed to right legs then bilateral arms  She is using otc cortisone cream but was not helping, then she found aloe vera with lidocaine which helps soothe the area  Being inside in AC helps      There were no vitals taken for this visit.    Review of Systems   Constitutional: Negative for activity change and unexpected weight change.   HENT: Negative for hearing loss, rhinorrhea and trouble swallowing.    Eyes: Negative for discharge and visual disturbance.   Respiratory: Negative for chest tightness and wheezing.    Cardiovascular: Negative for chest pain and palpitations.   Gastrointestinal: Negative for blood in stool, constipation, diarrhea and vomiting.   Endocrine: Negative  for polydipsia and polyuria.   Genitourinary: Negative for difficulty urinating, dysuria, hematuria and menstrual problem.   Musculoskeletal: Negative for arthralgias, joint swelling and neck pain.   Neurological: Negative for weakness and headaches.   Psychiatric/Behavioral: Negative for confusion and dysphoric mood.       Objective:      Physical Exam  Constitutional:       General: She is not in acute distress.     Appearance: She is well-developed. She is not diaphoretic.   HENT:      Head: Normocephalic and atraumatic.      Right Ear: External ear normal.      Left Ear: External ear normal.   Eyes:      General: No scleral icterus.        Right eye: No discharge.         Left eye: No discharge.      Conjunctiva/sclera: Conjunctivae normal.   Pulmonary:      Effort: Pulmonary effort is normal. No tachypnea, accessory muscle usage or respiratory distress.      Breath sounds: No stridor.   Musculoskeletal:      Cervical back: Normal range of motion and neck supple.   Skin:     Findings: No rash.   Neurological:      Mental Status: She is alert. She is not disoriented.   Psychiatric:         Attention and Perception: She is attentive.         Mood and Affect: Mood is not anxious or depressed. Affect is not labile, blunt, angry or inappropriate.         Speech: Speech normal.         Behavior: Behavior normal.         Thought Content: Thought content normal.         Judgment: Judgment normal.         Assessment:       1. Poison ivy dermatitis    2. Uncontrolled type 2 diabetes mellitus with hyperglycemia    3. Insomnia, unspecified type        Plan:       Josiah was seen today for rash.    Diagnoses and all orders for this visit:    Poison ivy dermatitis  -     triamcinolone acetonide 0.1% (KENALOG) 0.1 % ointment; Apply topically 2 (two) times daily. for 10 days  Offered oral steroids  Patient wants to hold off due to a1c, hx of dm2  If not improving she will message me for oral steroids and we may possibly add some  glimepiride while she is on the oral steroids if needed    Uncontrolled type 2 diabetes mellitus with hyperglycemia  Not controlled  On Jardiance    Insomnia, unspecified type  Stable on trazodone

## 2022-08-22 NOTE — TELEPHONE ENCOUNTER
Called pt; informed pt I was calling to confirm her virtual EAWV on 8/24/22 at 12:00pm and to see if she needed any help; pt stated she did not need any help and would complete e-pre check later today; pt informed to login 15 minutes prior to appt time; sent message through portal

## 2022-08-24 ENCOUNTER — PATIENT MESSAGE (OUTPATIENT)
Dept: INTERNAL MEDICINE | Facility: CLINIC | Age: 72
End: 2022-08-24
Payer: MEDICARE

## 2022-08-24 ENCOUNTER — OFFICE VISIT (OUTPATIENT)
Dept: HOME HEALTH SERVICES | Facility: CLINIC | Age: 72
End: 2022-08-24
Payer: MEDICARE

## 2022-08-24 ENCOUNTER — PATIENT MESSAGE (OUTPATIENT)
Dept: ADMINISTRATIVE | Facility: CLINIC | Age: 72
End: 2022-08-24
Payer: MEDICARE

## 2022-08-24 ENCOUNTER — TELEPHONE (OUTPATIENT)
Dept: ADMINISTRATIVE | Facility: CLINIC | Age: 72
End: 2022-08-24
Payer: MEDICARE

## 2022-08-24 VITALS — HEIGHT: 60 IN | WEIGHT: 132 LBS | BODY MASS INDEX: 25.91 KG/M2

## 2022-08-24 DIAGNOSIS — E11.69 DYSLIPIDEMIA ASSOCIATED WITH TYPE 2 DIABETES MELLITUS: ICD-10-CM

## 2022-08-24 DIAGNOSIS — L23.7 POISON IVY DERMATITIS: ICD-10-CM

## 2022-08-24 DIAGNOSIS — E11.65 UNCONTROLLED TYPE 2 DIABETES MELLITUS WITH HYPERGLYCEMIA: ICD-10-CM

## 2022-08-24 DIAGNOSIS — L23.7 POISON IVY: Primary | ICD-10-CM

## 2022-08-24 DIAGNOSIS — E78.5 DYSLIPIDEMIA ASSOCIATED WITH TYPE 2 DIABETES MELLITUS: ICD-10-CM

## 2022-08-24 DIAGNOSIS — Z00.00 ENCOUNTER FOR PREVENTIVE HEALTH EXAMINATION: Primary | ICD-10-CM

## 2022-08-24 PROBLEM — N18.31 STAGE 3A CHRONIC KIDNEY DISEASE: Status: RESOLVED | Noted: 2021-03-16 | Resolved: 2022-08-24

## 2022-08-24 PROCEDURE — G0439 PR MEDICARE ANNUAL WELLNESS SUBSEQUENT VISIT: ICD-10-PCS | Mod: 95,,, | Performed by: NURSE PRACTITIONER

## 2022-08-24 PROCEDURE — 3051F HG A1C>EQUAL 7.0%<8.0%: CPT | Mod: CPTII,95,, | Performed by: NURSE PRACTITIONER

## 2022-08-24 PROCEDURE — 3008F BODY MASS INDEX DOCD: CPT | Mod: CPTII,95,, | Performed by: NURSE PRACTITIONER

## 2022-08-24 PROCEDURE — 1160F RVW MEDS BY RX/DR IN RCRD: CPT | Mod: CPTII,95,, | Performed by: NURSE PRACTITIONER

## 2022-08-24 PROCEDURE — 3008F PR BODY MASS INDEX (BMI) DOCUMENTED: ICD-10-PCS | Mod: CPTII,95,, | Performed by: NURSE PRACTITIONER

## 2022-08-24 PROCEDURE — 3288F PR FALLS RISK ASSESSMENT DOCUMENTED: ICD-10-PCS | Mod: CPTII,95,, | Performed by: NURSE PRACTITIONER

## 2022-08-24 PROCEDURE — 3072F LOW RISK FOR RETINOPATHY: CPT | Mod: CPTII,95,, | Performed by: NURSE PRACTITIONER

## 2022-08-24 PROCEDURE — 1159F PR MEDICATION LIST DOCUMENTED IN MEDICAL RECORD: ICD-10-PCS | Mod: CPTII,95,, | Performed by: NURSE PRACTITIONER

## 2022-08-24 PROCEDURE — 1126F PR PAIN SEVERITY QUANTIFIED, NO PAIN PRESENT: ICD-10-PCS | Mod: CPTII,95,, | Performed by: NURSE PRACTITIONER

## 2022-08-24 PROCEDURE — 3288F FALL RISK ASSESSMENT DOCD: CPT | Mod: CPTII,95,, | Performed by: NURSE PRACTITIONER

## 2022-08-24 PROCEDURE — 1101F PR PT FALLS ASSESS DOC 0-1 FALLS W/OUT INJ PAST YR: ICD-10-PCS | Mod: CPTII,95,, | Performed by: NURSE PRACTITIONER

## 2022-08-24 PROCEDURE — 1126F AMNT PAIN NOTED NONE PRSNT: CPT | Mod: CPTII,95,, | Performed by: NURSE PRACTITIONER

## 2022-08-24 PROCEDURE — 1159F MED LIST DOCD IN RCRD: CPT | Mod: CPTII,95,, | Performed by: NURSE PRACTITIONER

## 2022-08-24 PROCEDURE — G0439 PPPS, SUBSEQ VISIT: HCPCS | Mod: 95,,, | Performed by: NURSE PRACTITIONER

## 2022-08-24 PROCEDURE — 3072F PR LOW RISK FOR RETINOPATHY: ICD-10-PCS | Mod: CPTII,95,, | Performed by: NURSE PRACTITIONER

## 2022-08-24 PROCEDURE — 1160F PR REVIEW ALL MEDS BY PRESCRIBER/CLIN PHARMACIST DOCUMENTED: ICD-10-PCS | Mod: CPTII,95,, | Performed by: NURSE PRACTITIONER

## 2022-08-24 PROCEDURE — 3051F PR MOST RECENT HEMOGLOBIN A1C LEVEL 7.0 - < 8.0%: ICD-10-PCS | Mod: CPTII,95,, | Performed by: NURSE PRACTITIONER

## 2022-08-24 PROCEDURE — 1101F PT FALLS ASSESS-DOCD LE1/YR: CPT | Mod: CPTII,95,, | Performed by: NURSE PRACTITIONER

## 2022-08-24 NOTE — PROGRESS NOTES
The patient location is: [place]  The chief complaint leading to consultation is: Health Risk Assessment    Visit type:audiovisual    Face to Face time with patient: 20  35 minutes of total time spent on the encounter, which includes face to face time and non-face to face time preparing to see the patient (eg, review of tests), Obtaining and/or reviewing separately obtained history, Documenting clinical information in the electronic or other health record, Independently interpreting results (not separately reported) and communicating results to the patient/family/caregiver, or Care coordination (not separately reported).         Each patient to whom he or she provides medical services by telemedicine is:  (1) informed of the relationship between the physician and patient and the respective role of any other health care provider with respect to management of the patient; and (2) notified that he or she may decline to receive medical services by telemedicine and may withdraw from such care at any time.    Notes:       Josiah Perkins presented for a  Medicare AWV and comprehensive Health Risk Assessment today. The following components were reviewed and updated:    · Medical history  · Family History  · Social history  · Allergies and Current Medications  · Health Risk Assessment  · Health Maintenance  · Care Team         ** See Completed Assessments for Annual Wellness Visit within the encounter summary.**         The following assessments were completed:  · Living Situation  · CAGE  · Depression Screening  · Fall Risk Assessment (MACH 10)  · Hearing Assessment(HHI)  · Cognitive Function Screening  · Nutrition Screening  · ADL Screening  · PAQ Screening      Vitals:    08/24/22 1216   Weight: 59.9 kg (132 lb)   Height: 5' (1.524 m)     Body mass index is 25.78 kg/m².  Physical Exam  Constitutional:       Appearance: Normal appearance.   Neurological:      General: No focal deficit present.      Mental Status: She is alert  and oriented to person, place, and time.               Diagnoses and health risks identified today and associated recommendations/orders:    1. Encounter for preventive health examination  Assessments completed. Preventive measures and health maintenance reviewed with patient.    2. Uncontrolled type 2 diabetes mellitus with hyperglycemia  Stable, patient on Jardiance. Last A1C 7.1. Followed by PCP.    3. Dyslipidemia associated with type 2 diabetes mellitus  Stable, patient on Lipitor. Followed by PCP.    4. Poison ivy dermatitis  Stable. Patient reports recent exposure to Poison Ivy. Seen by PCP. Prescribed Triamcinolone topical.    Provided Josiah with a 5-10 year written screening schedule and personal prevention plan. Recommendations were developed using the USPSTF age appropriate recommendations. Education, counseling, and referrals were provided as needed. After Visit Summary printed and given to patient which includes a list of additional screenings\tests needed.    Follow up in about 1 year (around 8/24/2023) for your next annual wellness visit.    Shantel Paniagua, NP  I offered to discuss advanced care planning, including how to pick a person who would make decisions for you if you were unable to make them for yourself, called a health care power of , and what kind of decisions you might make such as use of life sustaining treatments such as ventilators and tube feeding when faced with a life limiting illness recorded on a living will that they will need to know. (How you want to be cared for as you near the end of your natural life)     X Patient is interested in learning more about how to make advanced directives.  I provided them paperwork and offered to discuss this with them.

## 2022-08-24 NOTE — PATIENT INSTRUCTIONS
Counseling and Referral of Other Preventative  (Italic type indicates deductible and co-insurance are waived)    Patient Name: Josiah Perkins  Today's Date: 8/24/2022    Health Maintenance       Date Due Completion Date    Pneumococcal Vaccines (Age 65+) (1 - PCV) Never done ---    Shingles Vaccine (1 of 2) Never done ---    TETANUS VACCINE 03/29/2022 3/29/2012 (Done)    Override on 3/29/2012: Done    COVID-19 Vaccine (4 - Booster for Moderna series) 04/29/2022 12/29/2021    Eye Exam 06/15/2022 6/15/2021    Override on 10/7/2020: Done    Diabetes Urine Screening 09/27/2022 9/27/2021    Foot Exam 10/04/2022 10/4/2021    Override on 10/4/2021: Done    Override on 9/16/2020: Done    Influenza Vaccine (1) 09/01/2022 10/4/2021    Lipid Panel 09/27/2022 9/27/2021    Hemoglobin A1c 09/28/2022 3/28/2022    Mammogram 11/09/2022 11/9/2021    Low Dose Statin 05/06/2023 5/6/2022    Colorectal Cancer Screening 05/24/2024 5/24/2014    DEXA Scan 10/11/2024 10/11/2021        No orders of the defined types were placed in this encounter.    The following information is provided to all patients.  This information is to help you find resources for any of the problems found today that may be affecting your health:                Living healthy guide: www.[facility].[place].gov      Understanding Diabetes: www.diabetes.org      Eating healthy: www.cdc.gov/healthyweight      CDC home safety checklist: www.cdc.gov/steadi/patient.html      Agency on Aging: www.goea.[place].gov      Alcoholics anonymous (AA): www.aa.org      Physical Activity: www.holli.nih.gov/de9vbgd      Tobacco use: www.quitwithusla.org

## 2022-08-24 NOTE — TELEPHONE ENCOUNTER
Called pt; no answer; left message informing patient I was calling to confirm her virtual EAWV today at 12:00pm and to see if she needed any help with setting up for virtual visit or e-pre check and to login 15 minutes prior to scheduled appt; left my name & number for pt to return my call if she had any questions or concerns; sent message through portal

## 2022-08-25 ENCOUNTER — PATIENT MESSAGE (OUTPATIENT)
Dept: INTERNAL MEDICINE | Facility: CLINIC | Age: 72
End: 2022-08-25
Payer: MEDICARE

## 2022-08-25 PROBLEM — L23.7 POISON IVY DERMATITIS: Status: ACTIVE | Noted: 2022-08-25

## 2022-08-25 RX ORDER — PREDNISONE 20 MG/1
40 TABLET ORAL DAILY
Qty: 10 TABLET | Refills: 0 | Status: SHIPPED | OUTPATIENT
Start: 2022-08-25 | End: 2022-08-30

## 2022-08-25 RX ORDER — GLIMEPIRIDE 2 MG/1
TABLET ORAL
Qty: 60 TABLET | Refills: 0 | Status: SHIPPED | OUTPATIENT
Start: 2022-08-25 | End: 2022-11-07

## 2022-10-05 ENCOUNTER — TELEPHONE (OUTPATIENT)
Dept: INTERNAL MEDICINE | Facility: CLINIC | Age: 72
End: 2022-10-05
Payer: MEDICARE

## 2022-10-05 NOTE — TELEPHONE ENCOUNTER
----- Message from Elizabeth Reinoso sent at 10/5/2022 10:10 AM CDT -----  Alphonso calling from  with [facility] [employer] [facility], pt needs yearly exam please call pt 2 .[phone]    Thanks bsc

## 2022-10-18 ENCOUNTER — PATIENT MESSAGE (OUTPATIENT)
Dept: ADMINISTRATIVE | Facility: HOSPITAL | Age: 72
End: 2022-10-18
Payer: MEDICARE

## 2022-10-31 ENCOUNTER — LAB VISIT (OUTPATIENT)
Dept: LAB | Facility: HOSPITAL | Age: 72
End: 2022-10-31
Attending: FAMILY MEDICINE
Payer: MEDICARE

## 2022-10-31 DIAGNOSIS — E78.5 DYSLIPIDEMIA ASSOCIATED WITH TYPE 2 DIABETES MELLITUS: ICD-10-CM

## 2022-10-31 DIAGNOSIS — E11.65 UNCONTROLLED TYPE 2 DIABETES MELLITUS WITH HYPERGLYCEMIA: ICD-10-CM

## 2022-10-31 DIAGNOSIS — E11.69 DYSLIPIDEMIA ASSOCIATED WITH TYPE 2 DIABETES MELLITUS: ICD-10-CM

## 2022-10-31 LAB
ALBUMIN SERPL BCP-MCNC: 4.1 G/DL (ref 3.5–5.2)
ALP SERPL-CCNC: 53 U/L (ref 55–135)
ALT SERPL W/O P-5'-P-CCNC: 28 U/L (ref 10–44)
ANION GAP SERPL CALC-SCNC: 11 MMOL/L (ref 8–16)
AST SERPL-CCNC: 20 U/L (ref 10–40)
BASOPHILS # BLD AUTO: 0.07 K/UL (ref 0–0.2)
BASOPHILS NFR BLD: 0.7 % (ref 0–1.9)
BILIRUB SERPL-MCNC: 0.5 MG/DL (ref 0.1–1)
BUN SERPL-MCNC: 14 MG/DL (ref 8–23)
CALCIUM SERPL-MCNC: 8.9 MG/DL (ref 8.7–10.5)
CHLORIDE SERPL-SCNC: 105 MMOL/L (ref 95–110)
CHOLEST SERPL-MCNC: 165 MG/DL (ref 120–199)
CHOLEST/HDLC SERPL: 2.8 {RATIO} (ref 2–5)
CO2 SERPL-SCNC: 22 MMOL/L (ref 23–29)
CREAT SERPL-MCNC: 0.8 MG/DL (ref 0.5–1.4)
DIFFERENTIAL METHOD: ABNORMAL
EOSINOPHIL # BLD AUTO: 0.1 K/UL (ref 0–0.5)
EOSINOPHIL NFR BLD: 1 % (ref 0–8)
ERYTHROCYTE [DISTWIDTH] IN BLOOD BY AUTOMATED COUNT: 12.6 % (ref 11.5–14.5)
EST. GFR  (NO RACE VARIABLE): >60 ML/MIN/1.73 M^2
ESTIMATED AVG GLUCOSE: 160 MG/DL (ref 68–131)
GLUCOSE SERPL-MCNC: 147 MG/DL (ref 70–110)
HBA1C MFR BLD: 7.2 % (ref 4–5.6)
HCT VFR BLD AUTO: 42.4 % (ref 37–48.5)
HDLC SERPL-MCNC: 59 MG/DL (ref 40–75)
HDLC SERPL: 35.8 % (ref 20–50)
HGB BLD-MCNC: 14 G/DL (ref 12–16)
IMM GRANULOCYTES # BLD AUTO: 0.02 K/UL (ref 0–0.04)
IMM GRANULOCYTES NFR BLD AUTO: 0.2 % (ref 0–0.5)
LDLC SERPL CALC-MCNC: 73 MG/DL (ref 63–159)
LYMPHOCYTES # BLD AUTO: 6.9 K/UL (ref 1–4.8)
LYMPHOCYTES NFR BLD: 64.6 % (ref 18–48)
MCH RBC QN AUTO: 32.4 PG (ref 27–31)
MCHC RBC AUTO-ENTMCNC: 33 G/DL (ref 32–36)
MCV RBC AUTO: 98 FL (ref 82–98)
MONOCYTES # BLD AUTO: 0.7 K/UL (ref 0.3–1)
MONOCYTES NFR BLD: 6.9 % (ref 4–15)
NEUTROPHILS # BLD AUTO: 2.9 K/UL (ref 1.8–7.7)
NEUTROPHILS NFR BLD: 26.6 % (ref 38–73)
NONHDLC SERPL-MCNC: 106 MG/DL
NRBC BLD-RTO: 0 /100 WBC
PLATELET # BLD AUTO: 212 K/UL (ref 150–450)
PMV BLD AUTO: 11.2 FL (ref 9.2–12.9)
POTASSIUM SERPL-SCNC: 4 MMOL/L (ref 3.5–5.1)
PROT SERPL-MCNC: 6.5 G/DL (ref 6–8.4)
RBC # BLD AUTO: 4.32 M/UL (ref 4–5.4)
SODIUM SERPL-SCNC: 138 MMOL/L (ref 136–145)
T4 FREE SERPL-MCNC: 0.81 NG/DL (ref 0.71–1.51)
TRIGL SERPL-MCNC: 165 MG/DL (ref 30–150)
TSH SERPL DL<=0.005 MIU/L-ACNC: 5.54 UIU/ML (ref 0.4–4)
WBC # BLD AUTO: 10.75 K/UL (ref 3.9–12.7)

## 2022-10-31 PROCEDURE — 80061 LIPID PANEL: CPT | Performed by: FAMILY MEDICINE

## 2022-10-31 PROCEDURE — 84439 ASSAY OF FREE THYROXINE: CPT | Performed by: FAMILY MEDICINE

## 2022-10-31 PROCEDURE — 83036 HEMOGLOBIN GLYCOSYLATED A1C: CPT | Performed by: FAMILY MEDICINE

## 2022-10-31 PROCEDURE — 36415 COLL VENOUS BLD VENIPUNCTURE: CPT | Mod: PO | Performed by: FAMILY MEDICINE

## 2022-10-31 PROCEDURE — 85025 COMPLETE CBC W/AUTO DIFF WBC: CPT | Performed by: FAMILY MEDICINE

## 2022-10-31 PROCEDURE — 80053 COMPREHEN METABOLIC PANEL: CPT | Performed by: FAMILY MEDICINE

## 2022-10-31 PROCEDURE — 84443 ASSAY THYROID STIM HORMONE: CPT | Performed by: FAMILY MEDICINE

## 2022-11-07 ENCOUNTER — OFFICE VISIT (OUTPATIENT)
Dept: INTERNAL MEDICINE | Facility: CLINIC | Age: 72
End: 2022-11-07
Payer: MEDICARE

## 2022-11-07 VITALS
DIASTOLIC BLOOD PRESSURE: 62 MMHG | BODY MASS INDEX: 25.67 KG/M2 | HEART RATE: 78 BPM | WEIGHT: 130.75 LBS | SYSTOLIC BLOOD PRESSURE: 116 MMHG | TEMPERATURE: 99 F | HEIGHT: 60 IN

## 2022-11-07 DIAGNOSIS — R79.89 ABNORMAL TSH: ICD-10-CM

## 2022-11-07 DIAGNOSIS — E78.5 DYSLIPIDEMIA ASSOCIATED WITH TYPE 2 DIABETES MELLITUS: ICD-10-CM

## 2022-11-07 DIAGNOSIS — E11.69 DYSLIPIDEMIA ASSOCIATED WITH TYPE 2 DIABETES MELLITUS: ICD-10-CM

## 2022-11-07 DIAGNOSIS — E11.9 CONTROLLED TYPE 2 DIABETES MELLITUS WITHOUT COMPLICATION, WITHOUT LONG-TERM CURRENT USE OF INSULIN: ICD-10-CM

## 2022-11-07 DIAGNOSIS — E11.65 UNCONTROLLED TYPE 2 DIABETES MELLITUS WITH HYPERGLYCEMIA: ICD-10-CM

## 2022-11-07 DIAGNOSIS — Z00.00 ANNUAL PHYSICAL EXAM: Primary | ICD-10-CM

## 2022-11-07 DIAGNOSIS — Z12.31 SCREENING MAMMOGRAM FOR BREAST CANCER: ICD-10-CM

## 2022-11-07 PROCEDURE — 3008F PR BODY MASS INDEX (BMI) DOCUMENTED: ICD-10-PCS | Mod: CPTII,S$GLB,, | Performed by: FAMILY MEDICINE

## 2022-11-07 PROCEDURE — G0008 ADMIN INFLUENZA VIRUS VAC: HCPCS | Mod: S$GLB,,, | Performed by: FAMILY MEDICINE

## 2022-11-07 PROCEDURE — 1126F PR PAIN SEVERITY QUANTIFIED, NO PAIN PRESENT: ICD-10-PCS | Mod: CPTII,S$GLB,, | Performed by: FAMILY MEDICINE

## 2022-11-07 PROCEDURE — 99999 PR PBB SHADOW E&M-EST. PATIENT-LVL III: CPT | Mod: PBBFAC,,, | Performed by: FAMILY MEDICINE

## 2022-11-07 PROCEDURE — 99397 PR PREVENTIVE VISIT,EST,65 & OVER: ICD-10-PCS | Mod: GZ,S$GLB,, | Performed by: FAMILY MEDICINE

## 2022-11-07 PROCEDURE — 1159F MED LIST DOCD IN RCRD: CPT | Mod: CPTII,S$GLB,, | Performed by: FAMILY MEDICINE

## 2022-11-07 PROCEDURE — 3066F PR DOCUMENTATION OF TREATMENT FOR NEPHROPATHY: ICD-10-PCS | Mod: CPTII,S$GLB,, | Performed by: FAMILY MEDICINE

## 2022-11-07 PROCEDURE — 1160F RVW MEDS BY RX/DR IN RCRD: CPT | Mod: CPTII,S$GLB,, | Performed by: FAMILY MEDICINE

## 2022-11-07 PROCEDURE — 3072F PR LOW RISK FOR RETINOPATHY: ICD-10-PCS | Mod: CPTII,S$GLB,, | Performed by: FAMILY MEDICINE

## 2022-11-07 PROCEDURE — 3078F PR MOST RECENT DIASTOLIC BLOOD PRESSURE < 80 MM HG: ICD-10-PCS | Mod: CPTII,S$GLB,, | Performed by: FAMILY MEDICINE

## 2022-11-07 PROCEDURE — 3066F NEPHROPATHY DOC TX: CPT | Mod: CPTII,S$GLB,, | Performed by: FAMILY MEDICINE

## 2022-11-07 PROCEDURE — 3078F DIAST BP <80 MM HG: CPT | Mod: CPTII,S$GLB,, | Performed by: FAMILY MEDICINE

## 2022-11-07 PROCEDURE — 3074F SYST BP LT 130 MM HG: CPT | Mod: CPTII,S$GLB,, | Performed by: FAMILY MEDICINE

## 2022-11-07 PROCEDURE — 90694 FLU VACCINE - QUADRIVALENT - ADJUVANTED: ICD-10-PCS | Mod: S$GLB,,, | Performed by: FAMILY MEDICINE

## 2022-11-07 PROCEDURE — 3051F PR MOST RECENT HEMOGLOBIN A1C LEVEL 7.0 - < 8.0%: ICD-10-PCS | Mod: CPTII,S$GLB,, | Performed by: FAMILY MEDICINE

## 2022-11-07 PROCEDURE — 3288F PR FALLS RISK ASSESSMENT DOCUMENTED: ICD-10-PCS | Mod: CPTII,S$GLB,, | Performed by: FAMILY MEDICINE

## 2022-11-07 PROCEDURE — 1159F PR MEDICATION LIST DOCUMENTED IN MEDICAL RECORD: ICD-10-PCS | Mod: CPTII,S$GLB,, | Performed by: FAMILY MEDICINE

## 2022-11-07 PROCEDURE — 1126F AMNT PAIN NOTED NONE PRSNT: CPT | Mod: CPTII,S$GLB,, | Performed by: FAMILY MEDICINE

## 2022-11-07 PROCEDURE — 3074F PR MOST RECENT SYSTOLIC BLOOD PRESSURE < 130 MM HG: ICD-10-PCS | Mod: CPTII,S$GLB,, | Performed by: FAMILY MEDICINE

## 2022-11-07 PROCEDURE — 3288F FALL RISK ASSESSMENT DOCD: CPT | Mod: CPTII,S$GLB,, | Performed by: FAMILY MEDICINE

## 2022-11-07 PROCEDURE — G0008 FLU VACCINE - QUADRIVALENT - ADJUVANTED: ICD-10-PCS | Mod: S$GLB,,, | Performed by: FAMILY MEDICINE

## 2022-11-07 PROCEDURE — 3061F PR NEG MICROALBUMINURIA RESULT DOCUMENTED/REVIEW: ICD-10-PCS | Mod: CPTII,S$GLB,, | Performed by: FAMILY MEDICINE

## 2022-11-07 PROCEDURE — 3061F NEG MICROALBUMINURIA REV: CPT | Mod: CPTII,S$GLB,, | Performed by: FAMILY MEDICINE

## 2022-11-07 PROCEDURE — 99397 PER PM REEVAL EST PAT 65+ YR: CPT | Mod: GZ,S$GLB,, | Performed by: FAMILY MEDICINE

## 2022-11-07 PROCEDURE — 3072F LOW RISK FOR RETINOPATHY: CPT | Mod: CPTII,S$GLB,, | Performed by: FAMILY MEDICINE

## 2022-11-07 PROCEDURE — 3008F BODY MASS INDEX DOCD: CPT | Mod: CPTII,S$GLB,, | Performed by: FAMILY MEDICINE

## 2022-11-07 PROCEDURE — 3051F HG A1C>EQUAL 7.0%<8.0%: CPT | Mod: CPTII,S$GLB,, | Performed by: FAMILY MEDICINE

## 2022-11-07 PROCEDURE — 1101F PR PT FALLS ASSESS DOC 0-1 FALLS W/OUT INJ PAST YR: ICD-10-PCS | Mod: CPTII,S$GLB,, | Performed by: FAMILY MEDICINE

## 2022-11-07 PROCEDURE — 99999 PR PBB SHADOW E&M-EST. PATIENT-LVL III: ICD-10-PCS | Mod: PBBFAC,,, | Performed by: FAMILY MEDICINE

## 2022-11-07 PROCEDURE — 1160F PR REVIEW ALL MEDS BY PRESCRIBER/CLIN PHARMACIST DOCUMENTED: ICD-10-PCS | Mod: CPTII,S$GLB,, | Performed by: FAMILY MEDICINE

## 2022-11-07 PROCEDURE — 1101F PT FALLS ASSESS-DOCD LE1/YR: CPT | Mod: CPTII,S$GLB,, | Performed by: FAMILY MEDICINE

## 2022-11-07 PROCEDURE — 90694 VACC AIIV4 NO PRSRV 0.5ML IM: CPT | Mod: S$GLB,,, | Performed by: FAMILY MEDICINE

## 2022-11-07 NOTE — PROGRESS NOTES
Subjective:      Patient ID: Josiah Perkins is a 72 y.o. female.    Chief Complaint: Annual Exam    HPI 72 y.o.   female patient with a PMHx of CKD, DM type II, and hyperlipidemia presents to clinic for annual exam. The patient was last seen on May 6, 2022      Today she reports she is doing well on the Jardiance. She is requesting a month script instead of monthly. Patient otherwise without complaints. Denies SOB, chest pain, and bowel changes.    Trying to stay active and walk more    Past Medical History:   Diagnosis Date    CKD (chronic kidney disease) stage 3, GFR 30-59 ml/min     Diabetes mellitus, type 2     Hyperlipidemia      Family History   Problem Relation Age of Onset    Alzheimer's disease Mother     Hyperlipidemia Mother     Thyroid cancer Sister     Heart failure Sister          at 42    Hodgkin's lymphoma Sister     Breast cancer Maternal Aunt     Breast cancer Maternal Aunt     Heart disease Maternal Grandmother     Colon cancer Maternal Grandfather     Heart disease Paternal Grandmother     Diabetes Neg Hx      Past Surgical History:   Procedure Laterality Date    APPENDECTOMY      BREAST CYST ASPIRATION Right     2019    BREAST CYST EXCISION Right     CATARACT EXTRACTION       SECTION      x 2    HYSTERECTOMY      SINUS SURGERY      TONSILLECTOMY       Social History     Tobacco Use    Smoking status: Never    Smokeless tobacco: Never   Substance Use Topics    Alcohol use: Yes     Comment: glass of wine every other day    Drug use: Never       /62   Pulse 78   Temp 98.6 °F (37 °C) (Temporal)   Ht 5' (1.524 m)   Wt 59.3 kg (130 lb 11.7 oz)   BMI 25.53 kg/m²     Review of Systems   Constitutional:  Negative for activity change, appetite change, chills, diaphoresis, fatigue, fever and unexpected weight change.   HENT:  Negative for congestion, ear pain, hearing loss, postnasal drip, rhinorrhea, sinus pain and sore throat.    Eyes:  Negative for pain, discharge, itching and visual  disturbance.   Respiratory:  Negative for cough, chest tightness, shortness of breath and wheezing.    Cardiovascular:  Negative for chest pain, palpitations and leg swelling.   Gastrointestinal:  Negative for abdominal pain, constipation, diarrhea, nausea and vomiting.   Endocrine: Negative for polydipsia and polyuria.   Genitourinary:  Negative for difficulty urinating, dysuria, flank pain, frequency, menstrual problem, pelvic pain and urgency.   Musculoskeletal:  Negative for arthralgias, back pain, joint swelling, myalgias and neck pain.   Skin:  Negative for color change and rash.   Neurological:  Negative for dizziness, weakness, light-headedness and headaches.   Hematological:  Negative for adenopathy.   Psychiatric/Behavioral:  Negative for confusion, decreased concentration and dysphoric mood.      Objective:     Physical Exam  Vitals and nursing note reviewed.   Constitutional:       General: She is not in acute distress.  HENT:      Right Ear: External ear normal.      Left Ear: External ear normal.      Nose: Nose normal.   Eyes:      Conjunctiva/sclera: Conjunctivae normal.      Pupils: Pupils are equal, round, and reactive to light.   Neck:      Vascular: No carotid bruit.   Cardiovascular:      Rate and Rhythm: Normal rate and regular rhythm.      Pulses:           Dorsalis pedis pulses are 3+ on the right side and 3+ on the left side.      Heart sounds: Normal heart sounds.   Pulmonary:      Effort: Pulmonary effort is normal. No respiratory distress.      Breath sounds: Normal breath sounds. No wheezing or rales.   Abdominal:      General: Bowel sounds are normal. There is no distension.      Palpations: Abdomen is soft.      Tenderness: There is no abdominal tenderness. There is no guarding.   Musculoskeletal:      Cervical back: Normal range of motion and neck supple.      Right lower leg: No edema.      Left lower leg: No edema.   Feet:      Right foot:      Protective Sensation: 6 sites tested.   6 sites sensed.      Skin integrity: No ulcer, blister or skin breakdown.      Left foot:      Protective Sensation: 6 sites tested.  6 sites sensed.      Skin integrity: No ulcer, blister or skin breakdown.   Skin:     General: Skin is warm and dry.      Findings: No rash.   Neurological:      Mental Status: She is alert and oriented to person, place, and time.   Psychiatric:         Behavior: Behavior normal.         Thought Content: Thought content normal.         Judgment: Judgment normal.       Lab Results   Component Value Date    WBC 10.75 10/31/2022    HGB 14.0 10/31/2022    HCT 42.4 10/31/2022     10/31/2022    CHOL 165 10/31/2022    TRIG 165 (H) 10/31/2022    HDL 59 10/31/2022    ALT 28 10/31/2022    AST 20 10/31/2022     10/31/2022    K 4.0 10/31/2022     10/31/2022    CREATININE 0.8 10/31/2022    BUN 14 10/31/2022    CO2 22 (L) 10/31/2022    TSH 5.537 (H) 10/31/2022    HGBA1C 7.2 (H) 10/31/2022       Assessment:     1. Annual physical exam    2. Uncontrolled type 2 diabetes mellitus with hyperglycemia    3. Dyslipidemia associated with type 2 diabetes mellitus    4. Controlled type 2 diabetes mellitus without complication, without long-term current use of insulin    5. Screening mammogram for breast cancer    6. Abnormal TSH         Plan:     Annual physical exam    Uncontrolled type 2 diabetes mellitus with hyperglycemia  -     Hemoglobin A1C; Future; Expected date: 05/07/2023    Dyslipidemia associated with type 2 diabetes mellitus    Controlled type 2 diabetes mellitus without complication, without long-term current use of insulin  -     empagliflozin (JARDIANCE) 25 mg tablet; Take 1 tablet (25 mg total) by mouth once daily.  Dispense: 90 tablet; Refill: 3    Screening mammogram for breast cancer  -     Mammo Digital Screening Bilat w/ Shahid; Future; Expected date: 11/07/2022    Abnormal TSH  -     TSH; Future; Expected date: 05/07/2023    Other orders  -     Influenza - Quadrivalent  (Adjuvanted)      Vitals reviewed. BP WNL at 116/62    Labs reviewed and discussed.   Jardiance refilled  Orders for mammogram placed  Flu vaccine administered today  Patient overall doing well.  Questions and concerns addressed.  Follow-up in 6 months with labs//recheck TSH        Documentation entered by Mya Marlow, acting as scribe for Dr. Odell Aquino. 11/07/2022 10:38 AM.

## 2022-11-08 ENCOUNTER — PATIENT OUTREACH (OUTPATIENT)
Dept: ADMINISTRATIVE | Facility: HOSPITAL | Age: 72
End: 2022-11-08
Payer: MEDICARE

## 2022-11-28 ENCOUNTER — PATIENT MESSAGE (OUTPATIENT)
Dept: INTERNAL MEDICINE | Facility: CLINIC | Age: 72
End: 2022-11-28
Payer: MEDICARE

## 2022-11-28 RX ORDER — DICYCLOMINE HYDROCHLORIDE 20 MG/1
20 TABLET ORAL
Qty: 150 TABLET | Refills: 3 | Status: SHIPPED | OUTPATIENT
Start: 2022-11-28 | End: 2023-09-25

## 2022-12-02 ENCOUNTER — PATIENT MESSAGE (OUTPATIENT)
Dept: DERMATOLOGY | Facility: CLINIC | Age: 72
End: 2022-12-02
Payer: MEDICARE

## 2022-12-05 DIAGNOSIS — L70.0 ACNE VULGARIS: Primary | ICD-10-CM

## 2022-12-05 RX ORDER — TRETINOIN 1 MG/G
CREAM TOPICAL
Qty: 20 G | Refills: 6 | Status: SHIPPED | OUTPATIENT
Start: 2022-12-05 | End: 2023-01-23

## 2022-12-06 ENCOUNTER — HOSPITAL ENCOUNTER (OUTPATIENT)
Dept: RADIOLOGY | Facility: HOSPITAL | Age: 72
Discharge: HOME | End: 2022-12-06
Attending: FAMILY MEDICINE
Payer: MEDICARE

## 2022-12-06 DIAGNOSIS — Z12.31 SCREENING MAMMOGRAM FOR BREAST CANCER: ICD-10-CM

## 2022-12-06 PROCEDURE — 77063 BREAST TOMOSYNTHESIS BI: CPT | Mod: TC,PO

## 2022-12-06 PROCEDURE — 77067 MAMMO DIGITAL SCREENING BILAT WITH TOMO: ICD-10-PCS | Mod: 26,,, | Performed by: RADIOLOGY

## 2022-12-06 PROCEDURE — 77067 SCR MAMMO BI INCL CAD: CPT | Mod: TC,PO

## 2022-12-06 PROCEDURE — 77063 MAMMO DIGITAL SCREENING BILAT WITH TOMO: ICD-10-PCS | Mod: 26,,, | Performed by: RADIOLOGY

## 2022-12-06 PROCEDURE — 77063 BREAST TOMOSYNTHESIS BI: CPT | Mod: 26,,, | Performed by: RADIOLOGY

## 2022-12-06 PROCEDURE — 77067 SCR MAMMO BI INCL CAD: CPT | Mod: 26,,, | Performed by: RADIOLOGY

## 2022-12-30 ENCOUNTER — PES CALL (OUTPATIENT)
Dept: ADMINISTRATIVE | Facility: CLINIC | Age: 72
End: 2022-12-30
Payer: MEDICARE

## 2023-01-23 ENCOUNTER — OFFICE VISIT (OUTPATIENT)
Dept: DERMATOLOGY | Facility: CLINIC | Age: 73
End: 2023-01-23
Payer: MEDICARE

## 2023-01-23 DIAGNOSIS — L70.0 ACNE VULGARIS: Primary | ICD-10-CM

## 2023-01-23 DIAGNOSIS — L82.1 SEBORRHEIC KERATOSIS: ICD-10-CM

## 2023-01-23 DIAGNOSIS — L71.9 ACNE ROSACEA: ICD-10-CM

## 2023-01-23 PROCEDURE — 1126F PR PAIN SEVERITY QUANTIFIED, NO PAIN PRESENT: ICD-10-PCS | Mod: CPTII,S$GLB,, | Performed by: DERMATOLOGY

## 2023-01-23 PROCEDURE — 99213 OFFICE O/P EST LOW 20 MIN: CPT | Mod: S$GLB,,, | Performed by: DERMATOLOGY

## 2023-01-23 PROCEDURE — 1126F AMNT PAIN NOTED NONE PRSNT: CPT | Mod: CPTII,S$GLB,, | Performed by: DERMATOLOGY

## 2023-01-23 PROCEDURE — 1101F PT FALLS ASSESS-DOCD LE1/YR: CPT | Mod: CPTII,S$GLB,, | Performed by: DERMATOLOGY

## 2023-01-23 PROCEDURE — 1159F PR MEDICATION LIST DOCUMENTED IN MEDICAL RECORD: ICD-10-PCS | Mod: CPTII,S$GLB,, | Performed by: DERMATOLOGY

## 2023-01-23 PROCEDURE — 1160F RVW MEDS BY RX/DR IN RCRD: CPT | Mod: CPTII,S$GLB,, | Performed by: DERMATOLOGY

## 2023-01-23 PROCEDURE — 1160F PR REVIEW ALL MEDS BY PRESCRIBER/CLIN PHARMACIST DOCUMENTED: ICD-10-PCS | Mod: CPTII,S$GLB,, | Performed by: DERMATOLOGY

## 2023-01-23 PROCEDURE — 3288F FALL RISK ASSESSMENT DOCD: CPT | Mod: CPTII,S$GLB,, | Performed by: DERMATOLOGY

## 2023-01-23 PROCEDURE — 1101F PR PT FALLS ASSESS DOC 0-1 FALLS W/OUT INJ PAST YR: ICD-10-PCS | Mod: CPTII,S$GLB,, | Performed by: DERMATOLOGY

## 2023-01-23 PROCEDURE — 99999 PR PBB SHADOW E&M-EST. PATIENT-LVL II: CPT | Mod: PBBFAC,,, | Performed by: DERMATOLOGY

## 2023-01-23 PROCEDURE — 99999 PR PBB SHADOW E&M-EST. PATIENT-LVL II: ICD-10-PCS | Mod: PBBFAC,,, | Performed by: DERMATOLOGY

## 2023-01-23 PROCEDURE — 3288F PR FALLS RISK ASSESSMENT DOCUMENTED: ICD-10-PCS | Mod: CPTII,S$GLB,, | Performed by: DERMATOLOGY

## 2023-01-23 PROCEDURE — 99213 PR OFFICE/OUTPT VISIT, EST, LEVL III, 20-29 MIN: ICD-10-PCS | Mod: S$GLB,,, | Performed by: DERMATOLOGY

## 2023-01-23 PROCEDURE — 1159F MED LIST DOCD IN RCRD: CPT | Mod: CPTII,S$GLB,, | Performed by: DERMATOLOGY

## 2023-01-23 RX ORDER — METRONIDAZOLE 7.5 MG/G
GEL TOPICAL 2 TIMES DAILY
Qty: 45 G | Refills: 3 | Status: SHIPPED | OUTPATIENT
Start: 2023-01-23 | End: 2023-10-16

## 2023-01-23 RX ORDER — DOXYCYCLINE 50 MG/1
CAPSULE ORAL
Qty: 30 CAPSULE | Refills: 5 | Status: SHIPPED | OUTPATIENT
Start: 2023-01-23 | End: 2023-07-10 | Stop reason: SDUPTHER

## 2023-01-23 NOTE — PROGRESS NOTES
Subjective:       Patient ID:  Josiah Perkins is a 72 y.o. female who presents for   Chief Complaint   Patient presents with    Follow-up     No new spots. FBSE     Hx of SK and CMN c/ scar of the left posterior thigh, last seen on 7/19/21.  She c/o recent facial breakouts.  + tender, drainage. Use tretinoin 0.1% cream several times/week.    The patient denies personal history of skin cancer. Mother c/ hx of skin CA.        Review of Systems   Constitutional:  Negative for fever and chills.   Gastrointestinal:  Negative for nausea and vomiting.   Skin:  Positive for activity-related sunscreen use. Negative for daily sunscreen use and recent sunburn.   Hematologic/Lymphatic: Does not bruise/bleed easily.      Objective:    Physical Exam   Constitutional: She appears well-developed and well-nourished. No distress.   Neurological: She is alert and oriented to person, place, and time. She is not disoriented.   Psychiatric: She has a normal mood and affect.   Skin:   Areas Examined (abnormalities noted in diagram):   Head / Face Inspection Performed  Neck Inspection Performed  Chest / Axilla Inspection Performed  Abdomen Inspection Performed  Back Inspection Performed  RUE Inspected  LUE Inspection Performed  RLE Inspected  LLE Inspection Performed  Nails and Digits Inspection Performed                 Diagram Legend     Erythematous scaling macule/papule c/w actinic keratosis       Vascular papule c/w angioma      Pigmented verrucoid papule/plaque c/w seborrheic keratosis      Yellow umbilicated papule c/w sebaceous hyperplasia      Irregularly shaped tan macule c/w lentigo     1-2 mm smooth white papules consistent with Milia      Movable subcutaneous cyst with punctum c/w epidermal inclusion cyst      Subcutaneous movable cyst c/w pilar cyst      Firm pink to brown papule c/w dermatofibroma      Pedunculated fleshy papule(s) c/w skin tag(s)      Evenly pigmented macule c/w junctional nevus     Mildly variegated  pigmented, slightly irregular-bordered macule c/w mildly atypical nevus      Flesh colored to evenly pigmented papule c/w intradermal nevus       Pink pearly papule/plaque c/w basal cell carcinoma      Erythematous hyperkeratotic cursted plaque c/w SCC      Surgical scar with no sign of skin cancer recurrence      Open and closed comedones      Inflammatory papules and pustules      Verrucoid papule consistent consistent with wart     Erythematous eczematous patches and plaques     Dystrophic onycholytic nail with subungual debris c/w onychomycosis     Umbilicated papule    Erythematous-base heme-crusted tan verrucoid plaque consistent with inflamed seborrheic keratosis     Erythematous Silvery Scaling Plaque c/w Psoriasis     See annotation      Assessment / Plan:        Acne vulgaris  Acne rosacea  -     metroNIDAZOLE (METROGEL) 0.75 % gel; Apply topically 2 (two) times daily.  Dispense: 45 g; Refill: 3  -     doxycycline (MONODOX) 50 MG Cap; Take once daily with food. May cause upset stomach.  Dispense: 30 capsule; Refill: 5  -     will d/c tretinoin given lack of improvement. Will tx as rosacea. Side effect profile of doxy reviewed including increased sun sensitivity and upset stomach.    Seborrheic keratosis  Reassurance given. Discussed diagnosis and that lesions are benign.  AAD handout given.     Cyst of the skin  Of the right cheek, will see if doxy improves. Consider excision if becomes infected in the future.           Follow up in about 1 year (around 1/23/2024).

## 2023-01-26 ENCOUNTER — TELEPHONE (OUTPATIENT)
Dept: ADMINISTRATIVE | Facility: HOSPITAL | Age: 73
End: 2023-01-26
Payer: MEDICARE

## 2023-02-03 ENCOUNTER — TELEPHONE (OUTPATIENT)
Dept: ADMINISTRATIVE | Facility: HOSPITAL | Age: 73
End: 2023-02-03
Payer: MEDICARE

## 2023-02-07 ENCOUNTER — TELEPHONE (OUTPATIENT)
Dept: ADMINISTRATIVE | Facility: HOSPITAL | Age: 73
End: 2023-02-07
Payer: MEDICARE

## 2023-02-23 ENCOUNTER — PATIENT MESSAGE (OUTPATIENT)
Dept: ADMINISTRATIVE | Facility: HOSPITAL | Age: 73
End: 2023-02-23
Payer: MEDICARE

## 2023-02-23 ENCOUNTER — TELEPHONE (OUTPATIENT)
Dept: ADMINISTRATIVE | Facility: HOSPITAL | Age: 73
End: 2023-02-23
Payer: MEDICARE

## 2023-05-01 ENCOUNTER — LAB VISIT (OUTPATIENT)
Dept: LAB | Facility: HOSPITAL | Age: 73
End: 2023-05-01
Attending: FAMILY MEDICINE
Payer: MEDICARE

## 2023-05-01 DIAGNOSIS — R79.89 ABNORMAL TSH: ICD-10-CM

## 2023-05-01 DIAGNOSIS — E11.65 UNCONTROLLED TYPE 2 DIABETES MELLITUS WITH HYPERGLYCEMIA: ICD-10-CM

## 2023-05-01 PROCEDURE — 83036 HEMOGLOBIN GLYCOSYLATED A1C: CPT | Performed by: FAMILY MEDICINE

## 2023-05-01 PROCEDURE — 84443 ASSAY THYROID STIM HORMONE: CPT | Performed by: FAMILY MEDICINE

## 2023-05-01 PROCEDURE — 36415 COLL VENOUS BLD VENIPUNCTURE: CPT | Mod: PO | Performed by: FAMILY MEDICINE

## 2023-05-02 LAB
ESTIMATED AVG GLUCOSE: 154 MG/DL (ref 68–131)
HBA1C MFR BLD: 7 % (ref 4–5.6)
TSH SERPL DL<=0.005 MIU/L-ACNC: 2.61 UIU/ML (ref 0.4–4)

## 2023-05-08 ENCOUNTER — OFFICE VISIT (OUTPATIENT)
Dept: INTERNAL MEDICINE | Facility: CLINIC | Age: 73
End: 2023-05-08
Payer: MEDICARE

## 2023-05-08 VITALS
WEIGHT: 134.5 LBS | HEART RATE: 73 BPM | TEMPERATURE: 97 F | HEIGHT: 60 IN | SYSTOLIC BLOOD PRESSURE: 112 MMHG | DIASTOLIC BLOOD PRESSURE: 70 MMHG | BODY MASS INDEX: 26.41 KG/M2

## 2023-05-08 DIAGNOSIS — R79.89 ABNORMAL TSH: ICD-10-CM

## 2023-05-08 DIAGNOSIS — K52.9 CHRONIC DIARRHEA: ICD-10-CM

## 2023-05-08 DIAGNOSIS — E78.5 DYSLIPIDEMIA ASSOCIATED WITH TYPE 2 DIABETES MELLITUS: ICD-10-CM

## 2023-05-08 DIAGNOSIS — E11.69 DYSLIPIDEMIA ASSOCIATED WITH TYPE 2 DIABETES MELLITUS: ICD-10-CM

## 2023-05-08 DIAGNOSIS — E11.9 CONTROLLED TYPE 2 DIABETES MELLITUS WITHOUT COMPLICATION, WITHOUT LONG-TERM CURRENT USE OF INSULIN: Primary | ICD-10-CM

## 2023-05-08 PROCEDURE — 1126F PR PAIN SEVERITY QUANTIFIED, NO PAIN PRESENT: ICD-10-PCS | Mod: CPTII,S$GLB,, | Performed by: FAMILY MEDICINE

## 2023-05-08 PROCEDURE — 99999 PR PBB SHADOW E&M-EST. PATIENT-LVL III: CPT | Mod: PBBFAC,,, | Performed by: FAMILY MEDICINE

## 2023-05-08 PROCEDURE — 3008F PR BODY MASS INDEX (BMI) DOCUMENTED: ICD-10-PCS | Mod: CPTII,S$GLB,, | Performed by: FAMILY MEDICINE

## 2023-05-08 PROCEDURE — 3051F PR MOST RECENT HEMOGLOBIN A1C LEVEL 7.0 - < 8.0%: ICD-10-PCS | Mod: CPTII,S$GLB,, | Performed by: FAMILY MEDICINE

## 2023-05-08 PROCEDURE — 3078F DIAST BP <80 MM HG: CPT | Mod: CPTII,S$GLB,, | Performed by: FAMILY MEDICINE

## 2023-05-08 PROCEDURE — 1159F MED LIST DOCD IN RCRD: CPT | Mod: CPTII,S$GLB,, | Performed by: FAMILY MEDICINE

## 2023-05-08 PROCEDURE — 99999 PR PBB SHADOW E&M-EST. PATIENT-LVL III: ICD-10-PCS | Mod: PBBFAC,,, | Performed by: FAMILY MEDICINE

## 2023-05-08 PROCEDURE — 99214 PR OFFICE/OUTPT VISIT, EST, LEVL IV, 30-39 MIN: ICD-10-PCS | Mod: S$GLB,,, | Performed by: FAMILY MEDICINE

## 2023-05-08 PROCEDURE — 3078F PR MOST RECENT DIASTOLIC BLOOD PRESSURE < 80 MM HG: ICD-10-PCS | Mod: CPTII,S$GLB,, | Performed by: FAMILY MEDICINE

## 2023-05-08 PROCEDURE — 3074F SYST BP LT 130 MM HG: CPT | Mod: CPTII,S$GLB,, | Performed by: FAMILY MEDICINE

## 2023-05-08 PROCEDURE — 99214 OFFICE O/P EST MOD 30 MIN: CPT | Mod: S$GLB,,, | Performed by: FAMILY MEDICINE

## 2023-05-08 PROCEDURE — 1160F PR REVIEW ALL MEDS BY PRESCRIBER/CLIN PHARMACIST DOCUMENTED: ICD-10-PCS | Mod: CPTII,S$GLB,, | Performed by: FAMILY MEDICINE

## 2023-05-08 PROCEDURE — 1159F PR MEDICATION LIST DOCUMENTED IN MEDICAL RECORD: ICD-10-PCS | Mod: CPTII,S$GLB,, | Performed by: FAMILY MEDICINE

## 2023-05-08 PROCEDURE — 1160F RVW MEDS BY RX/DR IN RCRD: CPT | Mod: CPTII,S$GLB,, | Performed by: FAMILY MEDICINE

## 2023-05-08 PROCEDURE — 3008F BODY MASS INDEX DOCD: CPT | Mod: CPTII,S$GLB,, | Performed by: FAMILY MEDICINE

## 2023-05-08 PROCEDURE — 3074F PR MOST RECENT SYSTOLIC BLOOD PRESSURE < 130 MM HG: ICD-10-PCS | Mod: CPTII,S$GLB,, | Performed by: FAMILY MEDICINE

## 2023-05-08 PROCEDURE — 3051F HG A1C>EQUAL 7.0%<8.0%: CPT | Mod: CPTII,S$GLB,, | Performed by: FAMILY MEDICINE

## 2023-05-08 PROCEDURE — 1126F AMNT PAIN NOTED NONE PRSNT: CPT | Mod: CPTII,S$GLB,, | Performed by: FAMILY MEDICINE

## 2023-05-08 NOTE — PROGRESS NOTES
Subjective:      Patient ID: Josiah Perkins is a 73 y.o. female.    Chief Complaint:   f/u chronic conditions    HPI  72 yo here for f/u.  Deals with chronic diarrhea.  Last Cscope //due may 2024.  Weight up a few lbs  Taking meds//doing ok  Not on Abx from Derm    Past Medical History:   Diagnosis Date    CKD (chronic kidney disease) stage 3, GFR 30-59 ml/min     Diabetes mellitus, type 2     Hyperlipidemia      Family History   Problem Relation Age of Onset    Alzheimer's disease Mother     Hyperlipidemia Mother     Thyroid cancer Sister     Heart failure Sister          at 42    Hodgkin's lymphoma Sister     Breast cancer Maternal Aunt     Breast cancer Maternal Aunt     Heart disease Maternal Grandmother     Colon cancer Maternal Grandfather     Heart disease Paternal Grandmother     Diabetes Neg Hx      Past Surgical History:   Procedure Laterality Date    APPENDECTOMY      BREAST CYST ASPIRATION Right     2019    BREAST CYST EXCISION Right     CATARACT EXTRACTION       SECTION      x 2    HYSTERECTOMY      SINUS SURGERY      TONSILLECTOMY       Social History     Tobacco Use    Smoking status: Never    Smokeless tobacco: Never   Substance Use Topics    Alcohol use: Yes     Comment: glass of wine every other day    Drug use: Never       /70   Pulse 73   Temp 97.2 °F (36.2 °C) (Tympanic)   Ht 5' (1.524 m)   Wt 61 kg (134 lb 7.7 oz)   BMI 26.26 kg/m²     Review of Systems   Constitutional:  Negative for activity change, appetite change, chills, diaphoresis, fatigue, fever and unexpected weight change.   HENT:  Negative for ear pain, hearing loss, postnasal drip, rhinorrhea and tinnitus.    Eyes:  Negative for visual disturbance.   Respiratory:  Negative for cough, shortness of breath and wheezing.    Cardiovascular:  Negative for chest pain, palpitations and leg swelling.   Gastrointestinal:  Positive for diarrhea. Negative for abdominal distention.   Genitourinary:  Negative for dysuria,  frequency, hematuria and urgency.   Musculoskeletal:  Negative for back pain and joint swelling.   Neurological:  Negative for weakness and headaches.   Hematological:  Negative for adenopathy.   Psychiatric/Behavioral:  Negative for confusion and decreased concentration.      Objective:     Physical Exam  Vitals and nursing note reviewed.   Constitutional:       General: She is not in acute distress.     Appearance: She is well-developed.   Eyes:      Conjunctiva/sclera: Conjunctivae normal.      Pupils: Pupils are equal, round, and reactive to light.   Neck:      Thyroid: No thyromegaly.   Cardiovascular:      Rate and Rhythm: Normal rate and regular rhythm.      Heart sounds: Normal heart sounds.   Pulmonary:      Effort: Pulmonary effort is normal. No respiratory distress.      Breath sounds: Normal breath sounds. No wheezing or rales.   Abdominal:      General: Bowel sounds are normal. There is no distension.      Palpations: Abdomen is soft.      Tenderness: There is no abdominal tenderness. There is no guarding.   Musculoskeletal:         General: Normal range of motion.      Cervical back: Normal range of motion and neck supple.   Skin:     General: Skin is warm and dry.      Findings: No rash.   Neurological:      Mental Status: She is alert and oriented to person, place, and time.      Cranial Nerves: No cranial nerve deficit.   Psychiatric:         Behavior: Behavior normal.         Thought Content: Thought content normal.         Judgment: Judgment normal.       Lab Results   Component Value Date    WBC 10.75 10/31/2022    HGB 14.0 10/31/2022    HCT 42.4 10/31/2022     10/31/2022    CHOL 165 10/31/2022    TRIG 165 (H) 10/31/2022    HDL 59 10/31/2022    ALT 28 10/31/2022    AST 20 10/31/2022     10/31/2022    K 4.0 10/31/2022     10/31/2022    CREATININE 0.8 10/31/2022    BUN 14 10/31/2022    CO2 22 (L) 10/31/2022    TSH 2.611 05/01/2023    HGBA1C 7.0 (H) 05/01/2023       Assessment:      1. Controlled type 2 diabetes mellitus without complication, without long-term current use of insulin    2. Dyslipidemia associated with type 2 diabetes mellitus    3. Abnormal TSH    4. Chronic diarrhea         Plan:     Controlled type 2 diabetes mellitus without complication, without long-term current use of insulin  -     CBC Auto Differential; Future; Expected date: 11/08/2023  -     Comprehensive Metabolic Panel; Future; Expected date: 11/08/2023  -     Hemoglobin A1C; Future; Expected date: 11/08/2023  -     Microalbumin/Creatinine Ratio, Urine; Future; Expected date: 11/08/2023    Dyslipidemia associated with type 2 diabetes mellitus  -     Lipid Panel; Future; Expected date: 11/08/2023  -     TSH; Future; Expected date: 11/08/2023    Abnormal TSH  -     TSH; Future; Expected date: 11/08/2023    Chronic diarrhea    DM well controlled  TSH is normal/monitor  Lipids stable  No sign of CKD last 2 yrs//monitor  F/u 6 mos with labs  Try to get off glutein/processed foods

## 2023-06-12 ENCOUNTER — PATIENT MESSAGE (OUTPATIENT)
Dept: DERMATOLOGY | Facility: CLINIC | Age: 73
End: 2023-06-12
Payer: MEDICARE

## 2023-07-05 LAB
LEFT EYE DM RETINOPATHY: NEGATIVE
RIGHT EYE DM RETINOPATHY: NEGATIVE

## 2023-07-10 ENCOUNTER — OFFICE VISIT (OUTPATIENT)
Dept: DERMATOLOGY | Facility: CLINIC | Age: 73
End: 2023-07-10
Payer: MEDICARE

## 2023-07-10 DIAGNOSIS — L71.9 ROSACEA: Primary | ICD-10-CM

## 2023-07-10 DIAGNOSIS — L71.9 ACNE ROSACEA: ICD-10-CM

## 2023-07-10 DIAGNOSIS — L70.0 ACNE VULGARIS: ICD-10-CM

## 2023-07-10 DIAGNOSIS — L98.9 ECZEMATOUS SKIN LESIONS: ICD-10-CM

## 2023-07-10 PROCEDURE — 1160F RVW MEDS BY RX/DR IN RCRD: CPT | Mod: CPTII,95,, | Performed by: DERMATOLOGY

## 2023-07-10 PROCEDURE — 1160F PR REVIEW ALL MEDS BY PRESCRIBER/CLIN PHARMACIST DOCUMENTED: ICD-10-PCS | Mod: CPTII,95,, | Performed by: DERMATOLOGY

## 2023-07-10 PROCEDURE — 99214 PR OFFICE/OUTPT VISIT, EST, LEVL IV, 30-39 MIN: ICD-10-PCS | Mod: 95,,, | Performed by: DERMATOLOGY

## 2023-07-10 PROCEDURE — 3051F PR MOST RECENT HEMOGLOBIN A1C LEVEL 7.0 - < 8.0%: ICD-10-PCS | Mod: CPTII,95,, | Performed by: DERMATOLOGY

## 2023-07-10 PROCEDURE — 3051F HG A1C>EQUAL 7.0%<8.0%: CPT | Mod: CPTII,95,, | Performed by: DERMATOLOGY

## 2023-07-10 PROCEDURE — 1159F PR MEDICATION LIST DOCUMENTED IN MEDICAL RECORD: ICD-10-PCS | Mod: CPTII,95,, | Performed by: DERMATOLOGY

## 2023-07-10 PROCEDURE — 1159F MED LIST DOCD IN RCRD: CPT | Mod: CPTII,95,, | Performed by: DERMATOLOGY

## 2023-07-10 PROCEDURE — 99214 OFFICE O/P EST MOD 30 MIN: CPT | Mod: 95,,, | Performed by: DERMATOLOGY

## 2023-07-10 RX ORDER — TRIAMCINOLONE ACETONIDE 0.25 MG/G
OINTMENT TOPICAL
Qty: 15 G | Refills: 1 | Status: SHIPPED | OUTPATIENT
Start: 2023-07-10

## 2023-07-10 RX ORDER — ERYTHROMYCIN 20 MG/G
GEL TOPICAL
Qty: 60 G | Refills: 3 | Status: SHIPPED | OUTPATIENT
Start: 2023-07-10 | End: 2023-10-16 | Stop reason: SDUPTHER

## 2023-07-10 RX ORDER — DOXYCYCLINE 50 MG/1
CAPSULE ORAL
Qty: 30 CAPSULE | Refills: 5 | Status: SHIPPED | OUTPATIENT
Start: 2023-07-10 | End: 2023-10-16

## 2023-07-10 NOTE — PATIENT INSTRUCTIONS
Preventing Skin Cancer  Relaxing in the sun may feel good. But it isnt good for your skin. In fact, being exposed to the suns harmful rays is a major cause of skin cancer. This is a serious disease that can be life-threatening. People of all ages and backgrounds are at risk. But in most cases, skin cancer can be prevented.    Your Role in Prevention  You can act today to help prevent skin cancer. Start by avoiding the suns UV (ultraviolet) rays. And dont use tanning beds, which are no safer than the sun. Taking these steps can help keep you from getting skin cancer. It can also help prevent wrinkles and other sun-induced aging effects. Make sure your children also follow these safeguards. Now is the time to start taking preventive steps against skin cancer.  When You Are Outdoors  Protect your skin when you go outdoors during the day. Take precautions whenever you go out to eat, run errands by car or on foot, or do any outdoor activity. There isnt just one easy way to protect your skin. Its best to follow all of these steps:  Wear tightly woven clothing that covers your skin. Put on a wide-brimmed hat to protect your face, ears, and scalp.  Watch the clock. Try to avoid the sun between 10 a.m. and 4 p.m., when it is strongest.  Head for the shade or create your own. Use an umbrella when sitting or strolling.  Know that the suns rays can reflect off sand, water, and snow. This can harm your skin. Take extra care when you are near reflective surfaces.  Keep in mind that even when the weather is hazy or cloudy, your skin can be exposed to strong UV rays.  Shield your skin with sunscreen. Also, apply sunscreen to your childrens skin.  Tips for Using Sunscreen  To help prevent skin cancer, choose the right sunscreen and use it correctly. Try the following tips:  Choose a sunscreen that has a sun protection factor (SPF) of at least 15. For the best protection, an SPF of at least 30 is preferred. Also, choose a  sunscreen labeled broad spectrum. This will shield you from both UVA and UVB (ultraviolet A and B) rays.  If one brand irritates your skin, try another, particularly ones without fragrance.  Use a water-resistant sunscreen if swimming or sweating.  Reapply sunscreen every 2 hours. If youre active, do this more often.  Cover any sun-exposed skin, from your face to your feet. Dont forget your ears and your lips.  Know that while sunscreen helps protect you, it isnt enough. You should also wear protective clothing. And try to stay out of the sun as much as you can, especially from 10 a.m. to 4 p.m.  © 9703-0454 [employer]. [address] [place] [address], [place] [address]. All rights reserved. This information is not intended as a substitute for professional medical care. Always follow your healthcare professional's instructions.

## 2023-07-10 NOTE — PROGRESS NOTES
Subjective:      Patient ID:  Josiah Perkins is a 73 y.o. female who presents for No chief complaint on file.    The patient location is: home  The chief complaint leading to consultation is: rosacea f/u    Visit type: audiovisual    Face to Face time with patient: 15 min  20 minutes of total time spent on the encounter, which includes face to face time and non-face to face time preparing to see the patient (eg, review of tests), Obtaining and/or reviewing separately obtained history, Documenting clinical information in the electronic or other health record, Independently interpreting results (not separately reported) and communicating results to the patient/family/caregiver, or Care coordination (not separately reported).         Each patient to whom he or she provides medical services by telemedicine is:  (1) informed of the relationship between the physician and patient and the respective role of any other health care provider with respect to management of the patient; and (2) notified that he or she may decline to receive medical services by telemedicine and may withdraw from such care at any time.    Notes:         Hx of rosacea, SK's, cyst of the right cheek, last seen on 1/23/23.  She currently uses doxy 50 mg qD and metrogel bid.  + improvement, however recurrent white bumps of the perinasal area.        Review of Systems   Constitutional:  Negative for fever and chills.   Gastrointestinal:  Negative for nausea and vomiting.   Skin:  Positive for activity-related sunscreen use. Negative for daily sunscreen use and recent sunburn.   Hematologic/Lymphatic: Does not bruise/bleed easily.     Objective:   Physical Exam   Constitutional: She appears well-developed and well-nourished. No distress.   Neurological: She is alert and oriented to person, place, and time. She is not disoriented.   Psychiatric: She has a normal mood and affect.   Skin:   Areas Examined (abnormalities noted in diagram):   Head / Face  Inspection Performed  Neck Inspection Performed  RUE Inspected  LUE Inspection Performed  Nails and Digits Inspection Performed          Diagram Legend     Erythematous scaling macule/papule c/w actinic keratosis       Vascular papule c/w angioma      Pigmented verrucoid papule/plaque c/w seborrheic keratosis      Yellow umbilicated papule c/w sebaceous hyperplasia      Irregularly shaped tan macule c/w lentigo     1-2 mm smooth white papules consistent with Milia      Movable subcutaneous cyst with punctum c/w epidermal inclusion cyst      Subcutaneous movable cyst c/w pilar cyst      Firm pink to brown papule c/w dermatofibroma      Pedunculated fleshy papule(s) c/w skin tag(s)      Evenly pigmented macule c/w junctional nevus     Mildly variegated pigmented, slightly irregular-bordered macule c/w mildly atypical nevus      Flesh colored to evenly pigmented papule c/w intradermal nevus       Pink pearly papule/plaque c/w basal cell carcinoma      Erythematous hyperkeratotic cursted plaque c/w SCC      Surgical scar with no sign of skin cancer recurrence      Open and closed comedones      Inflammatory papules and pustules      Verrucoid papule consistent consistent with wart     Erythematous eczematous patches and plaques     Dystrophic onycholytic nail with subungual debris c/w onychomycosis     Umbilicated papule    Erythematous-base heme-crusted tan verrucoid plaque consistent with inflamed seborrheic keratosis     Erythematous Silvery Scaling Plaque c/w Psoriasis     See annotation            Assessment / Plan:        Rosacea  -     erythromycin with ethanoL (EMGEL) 2 % gel; AAA of face bid.  For rosacea/redness.  Dispense: 60 g; Refill: 3  Acne rosacea  -     doxycycline (MONODOX) 50 MG Cap; Take once daily with food. May cause upset stomach.  Dispense: 30 capsule; Refill: 5  -     pt with flares while on doxy and metrogel.  Will continue doxy, d/c metrogel and add emgel to see if improvement. Discussed  limitation by insurance coverage (consider soolantra if approved). The patient acknowledged understanding. Side effect profile of doxy reviewed including increased sun sensitivity and upset stomach.    Eczematous skin lesions  -     triamcinolone acetonide 0.025% (KENALOG) 0.025 % Oint; AAA bid prn eczema near eyebrow. Use for 1-2 weeks then taper. Mild steroid.  Dispense: 15 g; Refill: 1        -      discussed potential for contact allergy to eye make-up           Follow up in about 3 months (around 10/10/2023).

## 2023-08-03 ENCOUNTER — PATIENT MESSAGE (OUTPATIENT)
Dept: RESEARCH | Facility: HOSPITAL | Age: 73
End: 2023-08-03
Payer: MEDICARE

## 2023-09-25 RX ORDER — DICYCLOMINE HYDROCHLORIDE 20 MG/1
TABLET ORAL
Qty: 364 TABLET | Refills: 3 | Status: ON HOLD | OUTPATIENT
Start: 2023-09-25 | End: 2024-01-09 | Stop reason: HOSPADM

## 2023-10-16 ENCOUNTER — OFFICE VISIT (OUTPATIENT)
Dept: DERMATOLOGY | Facility: CLINIC | Age: 73
End: 2023-10-16
Payer: MEDICARE

## 2023-10-16 DIAGNOSIS — D18.01 HEMANGIOMA OF SKIN: ICD-10-CM

## 2023-10-16 DIAGNOSIS — L82.1 SEBORRHEIC KERATOSIS: ICD-10-CM

## 2023-10-16 DIAGNOSIS — L71.9 ROSACEA: Primary | ICD-10-CM

## 2023-10-16 PROCEDURE — 99999 PR PBB SHADOW E&M-EST. PATIENT-LVL II: CPT | Mod: PBBFAC,,, | Performed by: DERMATOLOGY

## 2023-10-16 PROCEDURE — 1101F PT FALLS ASSESS-DOCD LE1/YR: CPT | Mod: CPTII,S$GLB,, | Performed by: DERMATOLOGY

## 2023-10-16 PROCEDURE — 3288F PR FALLS RISK ASSESSMENT DOCUMENTED: ICD-10-PCS | Mod: CPTII,S$GLB,, | Performed by: DERMATOLOGY

## 2023-10-16 PROCEDURE — 1160F RVW MEDS BY RX/DR IN RCRD: CPT | Mod: CPTII,S$GLB,, | Performed by: DERMATOLOGY

## 2023-10-16 PROCEDURE — 99214 PR OFFICE/OUTPT VISIT, EST, LEVL IV, 30-39 MIN: ICD-10-PCS | Mod: S$GLB,,, | Performed by: DERMATOLOGY

## 2023-10-16 PROCEDURE — 1159F MED LIST DOCD IN RCRD: CPT | Mod: CPTII,S$GLB,, | Performed by: DERMATOLOGY

## 2023-10-16 PROCEDURE — 1126F AMNT PAIN NOTED NONE PRSNT: CPT | Mod: CPTII,S$GLB,, | Performed by: DERMATOLOGY

## 2023-10-16 PROCEDURE — 1126F PR PAIN SEVERITY QUANTIFIED, NO PAIN PRESENT: ICD-10-PCS | Mod: CPTII,S$GLB,, | Performed by: DERMATOLOGY

## 2023-10-16 PROCEDURE — 1160F PR REVIEW ALL MEDS BY PRESCRIBER/CLIN PHARMACIST DOCUMENTED: ICD-10-PCS | Mod: CPTII,S$GLB,, | Performed by: DERMATOLOGY

## 2023-10-16 PROCEDURE — 3051F HG A1C>EQUAL 7.0%<8.0%: CPT | Mod: CPTII,S$GLB,, | Performed by: DERMATOLOGY

## 2023-10-16 PROCEDURE — 1101F PR PT FALLS ASSESS DOC 0-1 FALLS W/OUT INJ PAST YR: ICD-10-PCS | Mod: CPTII,S$GLB,, | Performed by: DERMATOLOGY

## 2023-10-16 PROCEDURE — 99214 OFFICE O/P EST MOD 30 MIN: CPT | Mod: S$GLB,,, | Performed by: DERMATOLOGY

## 2023-10-16 PROCEDURE — 3051F PR MOST RECENT HEMOGLOBIN A1C LEVEL 7.0 - < 8.0%: ICD-10-PCS | Mod: CPTII,S$GLB,, | Performed by: DERMATOLOGY

## 2023-10-16 PROCEDURE — 1159F PR MEDICATION LIST DOCUMENTED IN MEDICAL RECORD: ICD-10-PCS | Mod: CPTII,S$GLB,, | Performed by: DERMATOLOGY

## 2023-10-16 PROCEDURE — 99999 PR PBB SHADOW E&M-EST. PATIENT-LVL II: ICD-10-PCS | Mod: PBBFAC,,, | Performed by: DERMATOLOGY

## 2023-10-16 PROCEDURE — 3288F FALL RISK ASSESSMENT DOCD: CPT | Mod: CPTII,S$GLB,, | Performed by: DERMATOLOGY

## 2023-10-16 RX ORDER — ERYTHROMYCIN 20 MG/G
GEL TOPICAL
Qty: 60 G | Refills: 5 | Status: SHIPPED | OUTPATIENT
Start: 2023-10-16

## 2023-10-16 RX ORDER — ATORVASTATIN CALCIUM 20 MG/1
TABLET, FILM COATED ORAL
Qty: 90 TABLET | Refills: 0 | Status: SHIPPED | OUTPATIENT
Start: 2023-10-16 | End: 2024-01-16 | Stop reason: SDUPTHER

## 2023-10-16 NOTE — TELEPHONE ENCOUNTER
Refill Decision Note   Josiah Perkins  is requesting a refill authorization.  Brief Assessment and Rationale for Refill:  Approve     Medication Therapy Plan:         Comments:     Note composed:4:11 PM 10/16/2023

## 2023-10-16 NOTE — PATIENT INSTRUCTIONS
Preventing Skin Cancer  Relaxing in the sun may feel good. But it isnt good for your skin. In fact, being exposed to the suns harmful rays is a major cause of skin cancer. This is a serious disease that can be life-threatening. People of all ages and backgrounds are at risk. But in most cases, skin cancer can be prevented.    Your Role in Prevention  You can act today to help prevent skin cancer. Start by avoiding the suns UV (ultraviolet) rays. And dont use tanning beds, which are no safer than the sun. Taking these steps can help keep you from getting skin cancer. It can also help prevent wrinkles and other sun-induced aging effects. Make sure your children also follow these safeguards. Now is the time to start taking preventive steps against skin cancer.  When You Are Outdoors  Protect your skin when you go outdoors during the day. Take precautions whenever you go out to eat, run errands by car or on foot, or do any outdoor activity. There isnt just one easy way to protect your skin. Its best to follow all of these steps:  Wear tightly woven clothing that covers your skin. Put on a wide-brimmed hat to protect your face, ears, and scalp.  Watch the clock. Try to avoid the sun between 10 a.m. and 4 p.m., when it is strongest.  Head for the shade or create your own. Use an umbrella when sitting or strolling.  Know that the suns rays can reflect off sand, water, and snow. This can harm your skin. Take extra care when you are near reflective surfaces.  Keep in mind that even when the weather is hazy or cloudy, your skin can be exposed to strong UV rays.  Shield your skin with sunscreen. Also, apply sunscreen to your childrens skin.  Tips for Using Sunscreen  To help prevent skin cancer, choose the right sunscreen and use it correctly. Try the following tips:  Choose a sunscreen that has a sun protection factor (SPF) of at least 15. For the best protection, an SPF of at least 30 is preferred. Also, choose a  sunscreen labeled broad spectrum. This will shield you from both UVA and UVB (ultraviolet A and B) rays.  If one brand irritates your skin, try another, particularly ones without fragrance.  Use a water-resistant sunscreen if swimming or sweating.  Reapply sunscreen every 2 hours. If youre active, do this more often.  Cover any sun-exposed skin, from your face to your feet. Dont forget your ears and your lips.  Know that while sunscreen helps protect you, it isnt enough. You should also wear protective clothing. And try to stay out of the sun as much as you can, especially from 10 a.m. to 4 p.m.  © 8834-6853 [employer]. [address] [place] [address], [place] [address]. All rights reserved. This information is not intended as a substitute for professional medical care. Always follow your healthcare professional's instructions.

## 2023-10-16 NOTE — PROGRESS NOTES
Subjective:      Patient ID:  Josiah Perkins is a 73 y.o. female who presents for   Chief Complaint   Patient presents with    Follow-up     On skin     Hx of possible ACD, rosacea, SK, and multiple nevi, last seen on 7/10/23.  She is currently on erythromycin gel for rosacea.  + improvement in complexion.     She c/o lesion of the right abdomen.  + darker in color. No tx tried.         Review of Systems   Constitutional:  Negative for fever and chills.   Gastrointestinal:  Negative for nausea and vomiting.   Skin:  Positive for daily sunscreen use and activity-related sunscreen use. Negative for recent sunburn.   Hematologic/Lymphatic: Does not bruise/bleed easily.       Objective:   Physical Exam   Constitutional: She appears well-developed and well-nourished. No distress.   Neurological: She is alert and oriented to person, place, and time. She is not disoriented.   Psychiatric: She has a normal mood and affect.   Skin:   Areas Examined (abnormalities noted in diagram):   Head / Face Inspection Performed  Neck Inspection Performed  Chest / Axilla Inspection Performed  Abdomen Inspection Performed  Back Inspection Performed  RUE Inspected  LUE Inspection Performed  Nails and Digits Inspection Performed                 Diagram Legend     Erythematous scaling macule/papule c/w actinic keratosis       Vascular papule c/w angioma      Pigmented verrucoid papule/plaque c/w seborrheic keratosis      Yellow umbilicated papule c/w sebaceous hyperplasia      Irregularly shaped tan macule c/w lentigo     1-2 mm smooth white papules consistent with Milia      Movable subcutaneous cyst with punctum c/w epidermal inclusion cyst      Subcutaneous movable cyst c/w pilar cyst      Firm pink to brown papule c/w dermatofibroma      Pedunculated fleshy papule(s) c/w skin tag(s)      Evenly pigmented macule c/w junctional nevus     Mildly variegated pigmented, slightly irregular-bordered macule c/w mildly atypical nevus      Flesh  colored to evenly pigmented papule c/w intradermal nevus       Pink pearly papule/plaque c/w basal cell carcinoma      Erythematous hyperkeratotic cursted plaque c/w SCC      Surgical scar with no sign of skin cancer recurrence      Open and closed comedones      Inflammatory papules and pustules      Verrucoid papule consistent consistent with wart     Erythematous eczematous patches and plaques     Dystrophic onycholytic nail with subungual debris c/w onychomycosis     Umbilicated papule    Erythematous-base heme-crusted tan verrucoid plaque consistent with inflamed seborrheic keratosis     Erythematous Silvery Scaling Plaque c/w Psoriasis     See annotation      Assessment / Plan:        Rosacea  -     erythromycin with ethanoL (EMGEL) 2 % gel; AAA of face bid.  For rosacea/redness.  Dispense: 60 g; Refill: 5  -     improvement per patient, will continue above med.    Seborrheic keratosis  Reassurance given. Discussed diagnosis and that lesions are benign.  AAD handout given.     Hemangioma of skin  Reassurance given.  Lesions are benign.             Follow up in about 1 year (around 10/16/2024).

## 2023-10-16 NOTE — TELEPHONE ENCOUNTER
No care due was identified.  [facility] Embedded Care Due Messages. Reference number: [id number].   10/16/2023 3:20:33 PM CDT

## 2023-11-01 ENCOUNTER — LAB VISIT (OUTPATIENT)
Dept: LAB | Facility: HOSPITAL | Age: 73
End: 2023-11-01
Attending: FAMILY MEDICINE
Payer: MEDICARE

## 2023-11-01 DIAGNOSIS — E11.9 CONTROLLED TYPE 2 DIABETES MELLITUS WITHOUT COMPLICATION, WITHOUT LONG-TERM CURRENT USE OF INSULIN: ICD-10-CM

## 2023-11-01 LAB
ALBUMIN/CREAT UR: 15.3 UG/MG (ref 0–30)
CREAT UR-MCNC: 98 MG/DL (ref 15–325)
MICROALBUMIN UR DL<=1MG/L-MCNC: 15 UG/ML

## 2023-11-01 PROCEDURE — 82043 UR ALBUMIN QUANTITATIVE: CPT | Performed by: FAMILY MEDICINE

## 2023-11-08 ENCOUNTER — LAB VISIT (OUTPATIENT)
Dept: LAB | Facility: HOSPITAL | Age: 73
End: 2023-11-08
Attending: FAMILY MEDICINE
Payer: MEDICARE

## 2023-11-08 ENCOUNTER — OFFICE VISIT (OUTPATIENT)
Dept: INTERNAL MEDICINE | Facility: CLINIC | Age: 73
End: 2023-11-08
Payer: MEDICARE

## 2023-11-08 VITALS
BODY MASS INDEX: 26.23 KG/M2 | HEART RATE: 96 BPM | TEMPERATURE: 98 F | SYSTOLIC BLOOD PRESSURE: 118 MMHG | HEIGHT: 60 IN | DIASTOLIC BLOOD PRESSURE: 70 MMHG | WEIGHT: 133.63 LBS

## 2023-11-08 DIAGNOSIS — E78.5 DYSLIPIDEMIA ASSOCIATED WITH TYPE 2 DIABETES MELLITUS: ICD-10-CM

## 2023-11-08 DIAGNOSIS — R79.89 ABNORMAL CBC: ICD-10-CM

## 2023-11-08 DIAGNOSIS — E11.69 DYSLIPIDEMIA ASSOCIATED WITH TYPE 2 DIABETES MELLITUS: ICD-10-CM

## 2023-11-08 DIAGNOSIS — Z12.31 ENCOUNTER FOR SCREENING MAMMOGRAM FOR MALIGNANT NEOPLASM OF BREAST: ICD-10-CM

## 2023-11-08 DIAGNOSIS — Z00.00 ANNUAL PHYSICAL EXAM: Primary | ICD-10-CM

## 2023-11-08 DIAGNOSIS — E11.65 UNCONTROLLED TYPE 2 DIABETES MELLITUS WITH HYPERGLYCEMIA: ICD-10-CM

## 2023-11-08 PROCEDURE — 99397 PR PREVENTIVE VISIT,EST,65 & OVER: ICD-10-PCS | Mod: 25,S$GLB,, | Performed by: FAMILY MEDICINE

## 2023-11-08 PROCEDURE — 3078F PR MOST RECENT DIASTOLIC BLOOD PRESSURE < 80 MM HG: ICD-10-PCS | Mod: CPTII,S$GLB,, | Performed by: FAMILY MEDICINE

## 2023-11-08 PROCEDURE — 90677 PCV20 VACCINE IM: CPT | Mod: S$GLB,,, | Performed by: FAMILY MEDICINE

## 2023-11-08 PROCEDURE — 90677 PNEUMOCOCCAL CONJUGATE VACCINE 20-VALENT: ICD-10-PCS | Mod: S$GLB,,, | Performed by: FAMILY MEDICINE

## 2023-11-08 PROCEDURE — 88189 FLOWCYTOMETRY/READ 16 & >: CPT | Mod: ,,, | Performed by: PATHOLOGY

## 2023-11-08 PROCEDURE — 3008F PR BODY MASS INDEX (BMI) DOCUMENTED: ICD-10-PCS | Mod: CPTII,S$GLB,, | Performed by: FAMILY MEDICINE

## 2023-11-08 PROCEDURE — 1126F AMNT PAIN NOTED NONE PRSNT: CPT | Mod: CPTII,S$GLB,, | Performed by: FAMILY MEDICINE

## 2023-11-08 PROCEDURE — 3061F PR NEG MICROALBUMINURIA RESULT DOCUMENTED/REVIEW: ICD-10-PCS | Mod: CPTII,S$GLB,, | Performed by: FAMILY MEDICINE

## 2023-11-08 PROCEDURE — 3061F NEG MICROALBUMINURIA REV: CPT | Mod: CPTII,S$GLB,, | Performed by: FAMILY MEDICINE

## 2023-11-08 PROCEDURE — 90694 VACC AIIV4 NO PRSRV 0.5ML IM: CPT | Mod: S$GLB,,, | Performed by: FAMILY MEDICINE

## 2023-11-08 PROCEDURE — G0009 ADMIN PNEUMOCOCCAL VACCINE: HCPCS | Mod: S$GLB,,, | Performed by: FAMILY MEDICINE

## 2023-11-08 PROCEDURE — 3074F SYST BP LT 130 MM HG: CPT | Mod: CPTII,S$GLB,, | Performed by: FAMILY MEDICINE

## 2023-11-08 PROCEDURE — G0008 ADMIN INFLUENZA VIRUS VAC: HCPCS | Mod: S$GLB,,, | Performed by: FAMILY MEDICINE

## 2023-11-08 PROCEDURE — 99999 PR PBB SHADOW E&M-EST. PATIENT-LVL III: ICD-10-PCS | Mod: PBBFAC,,, | Performed by: FAMILY MEDICINE

## 2023-11-08 PROCEDURE — 1159F PR MEDICATION LIST DOCUMENTED IN MEDICAL RECORD: ICD-10-PCS | Mod: CPTII,S$GLB,, | Performed by: FAMILY MEDICINE

## 2023-11-08 PROCEDURE — 1101F PR PT FALLS ASSESS DOC 0-1 FALLS W/OUT INJ PAST YR: ICD-10-PCS | Mod: CPTII,S$GLB,, | Performed by: FAMILY MEDICINE

## 2023-11-08 PROCEDURE — 1159F MED LIST DOCD IN RCRD: CPT | Mod: CPTII,S$GLB,, | Performed by: FAMILY MEDICINE

## 2023-11-08 PROCEDURE — 88189 PR  FLOWCYTOMETRY/READ, 16 & > MARKERS: ICD-10-PCS | Mod: ,,, | Performed by: PATHOLOGY

## 2023-11-08 PROCEDURE — 3066F PR DOCUMENTATION OF TREATMENT FOR NEPHROPATHY: ICD-10-PCS | Mod: CPTII,S$GLB,, | Performed by: FAMILY MEDICINE

## 2023-11-08 PROCEDURE — 3288F PR FALLS RISK ASSESSMENT DOCUMENTED: ICD-10-PCS | Mod: CPTII,S$GLB,, | Performed by: FAMILY MEDICINE

## 2023-11-08 PROCEDURE — 1126F PR PAIN SEVERITY QUANTIFIED, NO PAIN PRESENT: ICD-10-PCS | Mod: CPTII,S$GLB,, | Performed by: FAMILY MEDICINE

## 2023-11-08 PROCEDURE — 99999 PR PBB SHADOW E&M-EST. PATIENT-LVL III: CPT | Mod: PBBFAC,,, | Performed by: FAMILY MEDICINE

## 2023-11-08 PROCEDURE — 3066F NEPHROPATHY DOC TX: CPT | Mod: CPTII,S$GLB,, | Performed by: FAMILY MEDICINE

## 2023-11-08 PROCEDURE — 3078F DIAST BP <80 MM HG: CPT | Mod: CPTII,S$GLB,, | Performed by: FAMILY MEDICINE

## 2023-11-08 PROCEDURE — 3008F BODY MASS INDEX DOCD: CPT | Mod: CPTII,S$GLB,, | Performed by: FAMILY MEDICINE

## 2023-11-08 PROCEDURE — 3288F FALL RISK ASSESSMENT DOCD: CPT | Mod: CPTII,S$GLB,, | Performed by: FAMILY MEDICINE

## 2023-11-08 PROCEDURE — 1101F PT FALLS ASSESS-DOCD LE1/YR: CPT | Mod: CPTII,S$GLB,, | Performed by: FAMILY MEDICINE

## 2023-11-08 PROCEDURE — 3074F PR MOST RECENT SYSTOLIC BLOOD PRESSURE < 130 MM HG: ICD-10-PCS | Mod: CPTII,S$GLB,, | Performed by: FAMILY MEDICINE

## 2023-11-08 PROCEDURE — 88184 FLOWCYTOMETRY/ TC 1 MARKER: CPT | Performed by: PATHOLOGY

## 2023-11-08 PROCEDURE — 3051F HG A1C>EQUAL 7.0%<8.0%: CPT | Mod: CPTII,S$GLB,, | Performed by: FAMILY MEDICINE

## 2023-11-08 PROCEDURE — 88185 FLOWCYTOMETRY/TC ADD-ON: CPT | Performed by: PATHOLOGY

## 2023-11-08 PROCEDURE — 99397 PER PM REEVAL EST PAT 65+ YR: CPT | Mod: 25,S$GLB,, | Performed by: FAMILY MEDICINE

## 2023-11-08 PROCEDURE — 90694 FLU VACCINE - QUADRIVALENT - ADJUVANTED: ICD-10-PCS | Mod: S$GLB,,, | Performed by: FAMILY MEDICINE

## 2023-11-08 PROCEDURE — 3051F PR MOST RECENT HEMOGLOBIN A1C LEVEL 7.0 - < 8.0%: ICD-10-PCS | Mod: CPTII,S$GLB,, | Performed by: FAMILY MEDICINE

## 2023-11-08 PROCEDURE — G0008 FLU VACCINE - QUADRIVALENT - ADJUVANTED: ICD-10-PCS | Mod: S$GLB,,, | Performed by: FAMILY MEDICINE

## 2023-11-08 PROCEDURE — G0009 PNEUMOCOCCAL CONJUGATE VACCINE 20-VALENT: ICD-10-PCS | Mod: S$GLB,,, | Performed by: FAMILY MEDICINE

## 2023-11-08 RX ORDER — FLUCONAZOLE 150 MG/1
150 TABLET ORAL DAILY
Qty: 1 TABLET | Refills: 3 | Status: SHIPPED | OUTPATIENT
Start: 2023-11-08 | End: 2023-11-09

## 2023-11-08 NOTE — PROGRESS NOTES
Subjective:      Patient ID: Josiah Perkins is a 73 y.o. female.    Chief Complaint: Annual Exam    HPI  74 yo here for annual review.  Taking her meds.  Was sick for a few days prior to labs.  Was having vaginal/urinary symptoms, possibly sswollen lymph node.  Took some otc nystatin and symptoms have resolved.  Is taking jardiance.  NO CP/SOB  Bowels moving a bit more than usual, no blood/watery diarrhea.  Feeling well overall//dealing with a good bit of family stress    Past Medical History:   Diagnosis Date    CKD (chronic kidney disease) stage 3, GFR 30-59 ml/min     Diabetes mellitus, type 2     Hyperlipidemia      Family History   Problem Relation Age of Onset    Alzheimer's disease Mother     Hyperlipidemia Mother     Thyroid cancer Sister     Heart failure Sister          at 42    Hodgkin's lymphoma Sister     Breast cancer Maternal Aunt     Breast cancer Maternal Aunt     Heart disease Maternal Grandmother     Colon cancer Maternal Grandfather     Heart disease Paternal Grandmother     Diabetes Neg Hx      Past Surgical History:   Procedure Laterality Date    APPENDECTOMY      BREAST CYST ASPIRATION Right     2019    BREAST CYST EXCISION Right     CATARACT EXTRACTION       SECTION      x 2    HYSTERECTOMY      SINUS SURGERY      TONSILLECTOMY       Social History     Tobacco Use    Smoking status: Never    Smokeless tobacco: Never   Substance Use Topics    Alcohol use: Yes     Comment: glass of wine every other day    Drug use: Never       /70 (BP Location: Right arm)   Pulse 96   Temp 97.7 °F (36.5 °C)   Ht 5' (1.524 m)   Wt 60.6 kg (133 lb 9.6 oz)   BMI 26.09 kg/m²     Review of Systems   Constitutional:  Negative for activity change, appetite change, chills, diaphoresis, fatigue, fever and unexpected weight change.   HENT:  Negative for ear pain, hearing loss, postnasal drip, rhinorrhea and tinnitus.    Eyes:  Negative for visual disturbance.   Respiratory:  Negative for cough,  shortness of breath and wheezing.    Cardiovascular:  Negative for chest pain, palpitations and leg swelling.   Gastrointestinal:  Negative for abdominal distention.   Genitourinary:  Negative for dysuria, frequency, hematuria and urgency.   Musculoskeletal:  Negative for back pain and joint swelling.   Neurological:  Negative for weakness and headaches.   Hematological:  Negative for adenopathy.   Psychiatric/Behavioral:  Negative for confusion and decreased concentration.        Objective:     Physical Exam  Vitals and nursing note reviewed.   Constitutional:       General: She is not in acute distress.     Appearance: She is well-developed.   HENT:      Right Ear: External ear normal.      Left Ear: External ear normal.      Nose: Nose normal.   Eyes:      Conjunctiva/sclera: Conjunctivae normal.      Pupils: Pupils are equal, round, and reactive to light.   Neck:      Thyroid: No thyromegaly.   Cardiovascular:      Rate and Rhythm: Normal rate and regular rhythm.      Heart sounds: Normal heart sounds.   Pulmonary:      Effort: Pulmonary effort is normal. No respiratory distress.      Breath sounds: Normal breath sounds. No wheezing or rales.   Abdominal:      General: Bowel sounds are normal. There is no distension.      Palpations: Abdomen is soft.      Tenderness: There is no abdominal tenderness. There is no guarding.   Musculoskeletal:         General: Normal range of motion.      Cervical back: Normal range of motion and neck supple.   Skin:     General: Skin is warm and dry.      Findings: No rash.   Neurological:      Mental Status: She is alert and oriented to person, place, and time.      Cranial Nerves: No cranial nerve deficit.   Psychiatric:         Behavior: Behavior normal.         Thought Content: Thought content normal.         Judgment: Judgment normal.         Lab Results   Component Value Date    WBC 11.77 11/01/2023    HGB 14.5 11/01/2023    HCT 44.9 11/01/2023     11/01/2023    CHOL  168 11/01/2023    TRIG 169 (H) 11/01/2023    HDL 61 11/01/2023    ALT 18 11/01/2023    AST 16 11/01/2023     11/01/2023    K 3.9 11/01/2023     11/01/2023    CREATININE 1.0 11/01/2023    BUN 17 11/01/2023    CO2 21 (L) 11/01/2023    TSH 2.638 11/01/2023    HGBA1C 7.1 (H) 11/01/2023       Assessment:     1. Annual physical exam    2. Abnormal CBC    3. Uncontrolled type 2 diabetes mellitus with hyperglycemia    4. Dyslipidemia associated with type 2 diabetes mellitus    5. Encounter for screening mammogram for malignant neoplasm of breast         Plan:     Annual physical exam    Abnormal CBC  -     Flow Cytometry Analysis (Peripheral Blood); Future; Expected date: 11/08/2023    Uncontrolled type 2 diabetes mellitus with hyperglycemia    Dyslipidemia associated with type 2 diabetes mellitus    Encounter for screening mammogram for malignant neoplasm of breast  -     Mammo Digital Screening Bilat w/ Shahid; Future; Expected date: 12/06/2023    Other orders  -     fluconazole (DIFLUCAN) 150 MG Tab; Take 1 tablet (150 mg total) by mouth once daily. for 1 day  Dispense: 1 tablet; Refill: 3  -     (In Office Administered) Pneumococcal Conjugate Vaccine (20 Valent) (IM) (Preferred)      Diflucan to use PRN  Update mammo  Reviewed labs//all stable but CBC smear abnormal//will get flow cytometry.  Update flu and Pneumo 20 today  Get copy of eye exam//recently done  Work on better lifestyle//tighten up A1C//up a bit for pt  Fu 4-6 mos to establish with Dr. Mccloud

## 2023-11-09 ENCOUNTER — PATIENT MESSAGE (OUTPATIENT)
Dept: INTERNAL MEDICINE | Facility: CLINIC | Age: 73
End: 2023-11-09
Payer: MEDICARE

## 2023-11-09 LAB
FLOW CYTOMETRY ANTIBODIES ANALYZED - BLOOD: NORMAL
FLOW CYTOMETRY COMMENT - BLOOD: NORMAL
FLOW CYTOMETRY INTERPRETATION - BLOOD: NORMAL

## 2023-11-10 ENCOUNTER — TELEPHONE (OUTPATIENT)
Dept: INTERNAL MEDICINE | Facility: CLINIC | Age: 73
End: 2023-11-10
Payer: MEDICARE

## 2023-11-10 ENCOUNTER — PATIENT MESSAGE (OUTPATIENT)
Dept: INTERNAL MEDICINE | Facility: CLINIC | Age: 73
End: 2023-11-10
Payer: MEDICARE

## 2023-11-10 DIAGNOSIS — R79.89 ABNORMAL CBC: Primary | ICD-10-CM

## 2023-11-10 NOTE — TELEPHONE ENCOUNTER
----- Message from Katy Stevenson sent at 11/10/2023  1:32 PM CST -----  Contact: self [phone]  Patient called in this afternoon wanting to know had your office had a chance to get her scheduled with a hematology, she is very concerned about this. Please call back [phone]. Thanks lucia

## 2023-11-10 NOTE — TELEPHONE ENCOUNTER
Pt has a referral to [facility].  I have been told that y'all schedule your own referrals.  Please reach out to pt and assist with scheduling.

## 2023-11-12 DIAGNOSIS — E11.9 CONTROLLED TYPE 2 DIABETES MELLITUS WITHOUT COMPLICATION, WITHOUT LONG-TERM CURRENT USE OF INSULIN: ICD-10-CM

## 2023-11-12 NOTE — TELEPHONE ENCOUNTER
No care due was identified.  [facility] Embedded Care Due Messages. Reference number: [id number].   11/12/2023 4:43:28 PM CST

## 2023-11-13 RX ORDER — EMPAGLIFLOZIN 25 MG/1
25 TABLET, FILM COATED ORAL
Qty: 90 TABLET | Refills: 1 | Status: SHIPPED | OUTPATIENT
Start: 2023-11-13

## 2023-11-13 NOTE — TELEPHONE ENCOUNTER
Refill Decision Note   Josiah Perkins  is requesting a refill authorization.  Brief Assessment and Rationale for Refill:  Approve     Medication Therapy Plan:         Comments:     Note composed:10:57 AM 11/13/2023

## 2023-11-15 ENCOUNTER — TELEPHONE (OUTPATIENT)
Dept: HEMATOLOGY/ONCOLOGY | Facility: CLINIC | Age: 73
End: 2023-11-15
Payer: MEDICARE

## 2023-11-15 ENCOUNTER — PATIENT OUTREACH (OUTPATIENT)
Dept: ADMINISTRATIVE | Facility: HOSPITAL | Age: 73
End: 2023-11-15
Payer: MEDICARE

## 2023-11-15 NOTE — TELEPHONE ENCOUNTER
----- Message from Chasidy Fallon, RN sent at 11/14/2023  5:02 PM CST -----  Contact: Josiah Michaels,     If they don't have a cancer diagnosis or mass, the referral is for benign hem.    Thanks  ----- Message -----  From: Xenia Stevenson LPN  Sent: 11/13/2023   3:44 PM CST  To: [facility]    Dr. Bain but in a referral for this patient. They scheduled her for January. She is requesting a sooner appointment. She will be new to hem/onc.    Thanks  Xenia  ----- Message -----  From: Rolando Allen  Sent: 11/13/2023  10:31 AM CST  To: [facility] Staff    Pt is calling to get a sooner appt. She has been recently diagnosed with cancer. Please call back at [phone]          Thanks  SW

## 2023-11-17 ENCOUNTER — OFFICE VISIT (OUTPATIENT)
Dept: HEMATOLOGY/ONCOLOGY | Facility: CLINIC | Age: 73
End: 2023-11-17
Payer: MEDICARE

## 2023-11-17 ENCOUNTER — LAB VISIT (OUTPATIENT)
Dept: LAB | Facility: HOSPITAL | Age: 73
End: 2023-11-17
Attending: INTERNAL MEDICINE
Payer: MEDICARE

## 2023-11-17 VITALS
WEIGHT: 131.31 LBS | DIASTOLIC BLOOD PRESSURE: 67 MMHG | BODY MASS INDEX: 25.78 KG/M2 | TEMPERATURE: 98 F | OXYGEN SATURATION: 98 % | SYSTOLIC BLOOD PRESSURE: 110 MMHG | RESPIRATION RATE: 18 BRPM | HEIGHT: 60 IN | HEART RATE: 76 BPM

## 2023-11-17 DIAGNOSIS — R79.89 ABNORMAL CBC: ICD-10-CM

## 2023-11-17 DIAGNOSIS — D72.0 GENETIC ANOMALIES OF LEUKOCYTES: ICD-10-CM

## 2023-11-17 DIAGNOSIS — K59.00 CONSTIPATION, UNSPECIFIED CONSTIPATION TYPE: Primary | ICD-10-CM

## 2023-11-17 LAB
B2 MICROGLOB SERPL-MCNC: 2.1 UG/ML (ref 0–2.5)
HBV CORE AB SERPL QL IA: NORMAL
HBV SURFACE AB SER-ACNC: <3 MIU/ML
HBV SURFACE AB SER-ACNC: NORMAL M[IU]/ML
HBV SURFACE AG SERPL QL IA: NORMAL
LDH SERPL L TO P-CCNC: 158 U/L (ref 110–260)

## 2023-11-17 PROCEDURE — 88275 CYTOGENETICS 100-300: CPT | Mod: 59 | Performed by: INTERNAL MEDICINE

## 2023-11-17 PROCEDURE — 87350 HEPATITIS BE AG IA: CPT | Performed by: INTERNAL MEDICINE

## 2023-11-17 PROCEDURE — 3051F PR MOST RECENT HEMOGLOBIN A1C LEVEL 7.0 - < 8.0%: ICD-10-PCS | Mod: CPTII,S$GLB,, | Performed by: INTERNAL MEDICINE

## 2023-11-17 PROCEDURE — 3074F SYST BP LT 130 MM HG: CPT | Mod: CPTII,S$GLB,, | Performed by: INTERNAL MEDICINE

## 2023-11-17 PROCEDURE — 1101F PR PT FALLS ASSESS DOC 0-1 FALLS W/OUT INJ PAST YR: ICD-10-PCS | Mod: CPTII,S$GLB,, | Performed by: INTERNAL MEDICINE

## 2023-11-17 PROCEDURE — 87340 HEPATITIS B SURFACE AG IA: CPT | Performed by: INTERNAL MEDICINE

## 2023-11-17 PROCEDURE — 86706 HEP B SURFACE ANTIBODY: CPT | Performed by: INTERNAL MEDICINE

## 2023-11-17 PROCEDURE — 3061F NEG MICROALBUMINURIA REV: CPT | Mod: CPTII,S$GLB,, | Performed by: INTERNAL MEDICINE

## 2023-11-17 PROCEDURE — 3078F DIAST BP <80 MM HG: CPT | Mod: CPTII,S$GLB,, | Performed by: INTERNAL MEDICINE

## 2023-11-17 PROCEDURE — 1126F PR PAIN SEVERITY QUANTIFIED, NO PAIN PRESENT: ICD-10-PCS | Mod: CPTII,S$GLB,, | Performed by: INTERNAL MEDICINE

## 2023-11-17 PROCEDURE — 3288F PR FALLS RISK ASSESSMENT DOCUMENTED: ICD-10-PCS | Mod: CPTII,S$GLB,, | Performed by: INTERNAL MEDICINE

## 2023-11-17 PROCEDURE — 82232 ASSAY OF BETA-2 PROTEIN: CPT | Performed by: INTERNAL MEDICINE

## 2023-11-17 PROCEDURE — 99215 OFFICE O/P EST HI 40 MIN: CPT | Mod: S$GLB,,, | Performed by: INTERNAL MEDICINE

## 2023-11-17 PROCEDURE — 3061F PR NEG MICROALBUMINURIA RESULT DOCUMENTED/REVIEW: ICD-10-PCS | Mod: CPTII,S$GLB,, | Performed by: INTERNAL MEDICINE

## 2023-11-17 PROCEDURE — 1159F PR MEDICATION LIST DOCUMENTED IN MEDICAL RECORD: ICD-10-PCS | Mod: CPTII,S$GLB,, | Performed by: INTERNAL MEDICINE

## 2023-11-17 PROCEDURE — 3078F PR MOST RECENT DIASTOLIC BLOOD PRESSURE < 80 MM HG: ICD-10-PCS | Mod: CPTII,S$GLB,, | Performed by: INTERNAL MEDICINE

## 2023-11-17 PROCEDURE — 99215 PR OFFICE/OUTPT VISIT, EST, LEVL V, 40-54 MIN: ICD-10-PCS | Mod: S$GLB,,, | Performed by: INTERNAL MEDICINE

## 2023-11-17 PROCEDURE — 3074F PR MOST RECENT SYSTOLIC BLOOD PRESSURE < 130 MM HG: ICD-10-PCS | Mod: CPTII,S$GLB,, | Performed by: INTERNAL MEDICINE

## 2023-11-17 PROCEDURE — 99999 PR PBB SHADOW E&M-EST. PATIENT-LVL V: CPT | Mod: PBBFAC,,, | Performed by: INTERNAL MEDICINE

## 2023-11-17 PROCEDURE — 3051F HG A1C>EQUAL 7.0%<8.0%: CPT | Mod: CPTII,S$GLB,, | Performed by: INTERNAL MEDICINE

## 2023-11-17 PROCEDURE — 3008F BODY MASS INDEX DOCD: CPT | Mod: CPTII,S$GLB,, | Performed by: INTERNAL MEDICINE

## 2023-11-17 PROCEDURE — 86704 HEP B CORE ANTIBODY TOTAL: CPT | Performed by: INTERNAL MEDICINE

## 2023-11-17 PROCEDURE — 3288F FALL RISK ASSESSMENT DOCD: CPT | Mod: CPTII,S$GLB,, | Performed by: INTERNAL MEDICINE

## 2023-11-17 PROCEDURE — 3008F PR BODY MASS INDEX (BMI) DOCUMENTED: ICD-10-PCS | Mod: CPTII,S$GLB,, | Performed by: INTERNAL MEDICINE

## 2023-11-17 PROCEDURE — 3066F NEPHROPATHY DOC TX: CPT | Mod: CPTII,S$GLB,, | Performed by: INTERNAL MEDICINE

## 2023-11-17 PROCEDURE — 3066F PR DOCUMENTATION OF TREATMENT FOR NEPHROPATHY: ICD-10-PCS | Mod: CPTII,S$GLB,, | Performed by: INTERNAL MEDICINE

## 2023-11-17 PROCEDURE — 1126F AMNT PAIN NOTED NONE PRSNT: CPT | Mod: CPTII,S$GLB,, | Performed by: INTERNAL MEDICINE

## 2023-11-17 PROCEDURE — 99999 PR PBB SHADOW E&M-EST. PATIENT-LVL V: ICD-10-PCS | Mod: PBBFAC,,, | Performed by: INTERNAL MEDICINE

## 2023-11-17 PROCEDURE — 1159F MED LIST DOCD IN RCRD: CPT | Mod: CPTII,S$GLB,, | Performed by: INTERNAL MEDICINE

## 2023-11-17 PROCEDURE — 83615 LACTATE (LD) (LDH) ENZYME: CPT | Performed by: INTERNAL MEDICINE

## 2023-11-17 PROCEDURE — 1101F PT FALLS ASSESS-DOCD LE1/YR: CPT | Mod: CPTII,S$GLB,, | Performed by: INTERNAL MEDICINE

## 2023-11-17 PROCEDURE — 36415 COLL VENOUS BLD VENIPUNCTURE: CPT | Performed by: INTERNAL MEDICINE

## 2023-11-17 NOTE — PROGRESS NOTES
[facility]  [id number] [place] [id number]  Phone: [phone];  Fax: [phone]    Patient ID: Josiah Perkins   Chief Complaint: No chief complaint on file.     MRN:  [id number]     Reason for Referral:  Abnormal Flow Cytometry  Subjective   Josiah Perkins is a very pleasant 73 y.o. female who presents to clinic to establish care for evaluation of abnormal flow cytometry.    We reviewed her blood work in detail and the reason for referral.  She reports that the only changes in her health that she had is that in the last 1 month she has had decreased appetite, constipation, indigestion and fatigue; she also had a yeast infection and had a painful genital lymph node or nodule that has gone away.  She denies other B symptoms.  She is very active at baseline working in [employer], and spending time with her family.  She is already set up for age-appropriate cancer screenings except for colonoscopy.    She has a significant family history of malignancy (Maternal GF: colon cancer; 2 aunts with breast cancer in their 30s; sister - Thyroid cancer; Sister - HL).      I reviewed with her the concern and my recommendation for additional workup and she is in agreement.    Review of Systems:  Review of Systems   Constitutional:  Positive for appetite change (1 month) and fatigue (1 month). Negative for activity change, chills, diaphoresis, fever and unexpected weight change.   Eyes:  Negative for visual disturbance.   Respiratory:  Negative for shortness of breath.    Cardiovascular:  Negative for chest pain.   Gastrointestinal:  Positive for constipation (new). Negative for abdominal distention, abdominal pain, blood in stool, diarrhea, nausea and vomiting.        + indigestion and gas   Genitourinary:  Positive for frequency. Negative for difficulty urinating and hematuria.   Musculoskeletal:  Negative for myalgias. Back pain: scaly patch on the left eye.  Skin:  Positive for rash.    Neurological:  Negative for light-headedness and headaches.   Hematological:  Negative for adenopathy. Does not bruise/bleed easily.   Psychiatric/Behavioral:  The patient is nervous/anxious.      History     Oncology History    No history exists.         HPI    Past Medical History:   Diagnosis Date    CKD (chronic kidney disease) stage 3, GFR 30-59 ml/min     Diabetes mellitus, type 2     Hyperlipidemia        Past Surgical History:   Procedure Laterality Date    APPENDECTOMY      BREAST CYST ASPIRATION Right     2019    BREAST CYST EXCISION Right     CATARACT EXTRACTION       SECTION      x 2    HYSTERECTOMY      SINUS SURGERY      TONSILLECTOMY         Family History   Problem Relation Age of Onset    Alzheimer's disease Mother     Hyperlipidemia Mother     Thyroid cancer Sister     Heart failure Sister          at 42    Hodgkin's lymphoma Sister     Breast cancer Maternal Aunt     Breast cancer Maternal Aunt     Heart disease Maternal Grandmother     Colon cancer Maternal Grandfather     Heart disease Paternal Grandmother     Diabetes Neg Hx        Review of patient's allergies indicates:   Allergen Reactions    Codeine Nausea And Vomiting    Pcn [penicillins] Rash    Sulfa (sulfonamide antibiotics) Rash       Social History     Tobacco Use    Smoking status: Never    Smokeless tobacco: Never   Substance Use Topics    Alcohol use: Yes     Comment: glass of wine every other day    Drug use: Never       Physical Exam   ECOG:   ECOG SCORE    0 - Fully active-able to carry on all pre-disease performance without restriction          Vitals:  /67   Pulse 76   Temp 97.6 °F (36.4 °C)   Resp 18   Ht 5' (1.524 m)   Wt 59.5 kg (131 lb 4.5 oz)   SpO2 98%   BMI 25.64 kg/m²     Physical Exam:  Physical Exam  Constitutional:       General: She is not in acute distress.     Appearance: Normal appearance. She is not ill-appearing.   HENT:      Head: Normocephalic and atraumatic.   Eyes:       Extraocular Movements: Extraocular movements intact.      Conjunctiva/sclera: Conjunctivae normal.   Cardiovascular:      Rate and Rhythm: Normal rate and regular rhythm.      Heart sounds: No murmur heard.  Pulmonary:      Effort: Pulmonary effort is normal. No respiratory distress.      Breath sounds: Normal breath sounds. No wheezing, rhonchi or rales.   Abdominal:      General: Bowel sounds are normal. There is no distension.      Palpations: Abdomen is soft. There is no hepatomegaly, splenomegaly or mass.      Tenderness: There is no abdominal tenderness. There is no guarding.   Musculoskeletal:         General: Normal range of motion.      Cervical back: Neck supple. No rigidity or tenderness.      Right lower leg: No edema.      Left lower leg: No edema.   Lymphadenopathy:      Head:      Right side of head: No submental, submandibular, preauricular, posterior auricular or occipital adenopathy.      Left side of head: No submental, submandibular, preauricular, posterior auricular or occipital adenopathy.      Cervical: No cervical adenopathy.      Upper Body:      Right upper body: No supraclavicular or axillary adenopathy.      Left upper body: No supraclavicular or axillary adenopathy.      Lower Body: No right inguinal adenopathy. No left inguinal adenopathy.   Skin:     Findings: No rash.   Neurological:      General: No focal deficit present.      Mental Status: She is alert and oriented to person, place, and time.   Psychiatric:         Mood and Affect: Mood normal.         Behavior: Behavior normal.         Thought Content: Thought content normal.            Labs   Labs:  No visits with results within 2 Day(s) from this visit.   Latest known visit with results is:   Lab Visit on 11/08/2023   Component Date Value Ref Range Status    Blood Interpretation 11/08/2023 A B cell lymphoproliferative disorder is present.  See comment.   Final    Interp By Devon Aceves M.D., Signed on 11/09/2023 at 14:22    Blood  Comment 11/08/2023    Final                    Value:Flow cytometric analysis of peripheral blood detects a kappa light chain restricted B lymphocyte population (35.4% of total sample) showing expression of CD19 and bright CD20 with CD5 (dim) and FMC-7.  CD23 and CD38 are partially positive. T lymphocytes   are immunophenotypically unremarkable. The blast gate is not increased. The immunophenotypic findings favor mantle cell lymphoma. FISH for t(11;14) is highly recommended.      Flow differential:  Lymphocytes 55.3%, Monocytes 4.0%, Granulocytes  33.6%, Blast  0.0%, Debris/nRBC 0.1%,  Viability 99.8%.      Blood Antibodies Analyzed 11/08/2023 All analyzed: CD2, CD3, CD4, CD5, CD7, CD8, CD10, CD13, CD19, CD20, CD22, CD23, CD34, CD38, , FMC7, KAPPA, LAMBDA,CD45 and 7AAD.   Final    Comment: This test was developed and its performance characteristics   determined by [facility].    It has not been cleared or approved by the U.S. Food and Drug   Administration. The FDA has determined that such clearance or   approval is not necessary.  This test is used for clinical purposes.    It should not be regarded as investigational or for research.  This   laboratory is certified under CLIA-88 as qualified to perform high   complexity clinical laboratory testing.          Imaging   PET-CT pending      Assessment and Plan   B Cell Lymphoproliferative Disorder - Unclassified  DDx:  The patient has had an elevated lymphocyte count since at least 2020.  She had a flow cytometry done 11/08/2023 which showed a B-cell lymphoproliferative disorder; it was kappa light chain restricted B lymphocytes (35%); expressing CD19, bright CD20, CD5 dim, in FM 7; CD 23 and CD 38 or partially positive and T lymphocytes were unremarkable.  The blast gait was not increased.  By IHC favor mantle cell lymphoma.  The patient's peripheral blood smear showed many smudge cells.  B - symptoms:  Fatigue with the  last 1 month   We will obtain a PET-CT scan to assess for any lymphadenopathy   Refer to GI for C-scope and EGD given change in bowel habits as well as new UGI sx  We will also obtain LDH, CLL fish and beta 2 microglobulin  Refer to genetic counselor given significant FHx of malignancy  Once these tests have resulted will be able to determine if the patient requires a BM Bx or other excisional node Bx if LAD noted on PET-CT    Cancer Screening  MMG: Scheduled  PAP Smear: Hx hysterectomy  Colonoscopy 05/24/14: at [facility]; random bx taken for microscopic colitis and negative; colon normal; repeat 10 years  CT Chest    Chronic Medical Conditions  CKD IIIa  DM II  HLD  GERD  IBS      Med Onc Chart Routing      Follow up with physician 3 weeks. at the [place]   Follow up with MARKO    Infusion scheduling note    Injection scheduling note    Labs    Imaging PET scan      Pharmacy appointment    Other referrals                 The patient was seen, interviewed and examined. Pertinent lab and radiologic studies were reviewed. Pt instructed to call should they develop concerning signs/symptoms or have further questions.        Portions of the record may have been created with voice recognition software. Occasional wrong-word or sound-a-like substitutions may have occurred due to the inherent limitations of voice recognition software. Read the chart carefully and recognize, using context, where substitutions have occurred.      Jess Verdin MD    Hematology/Oncology

## 2023-11-20 ENCOUNTER — PATIENT MESSAGE (OUTPATIENT)
Dept: HEMATOLOGY/ONCOLOGY | Facility: CLINIC | Age: 73
End: 2023-11-20
Payer: MEDICARE

## 2023-11-20 LAB — HBV E AG SERPL QL IA: NONREACTIVE

## 2023-11-22 ENCOUNTER — TELEPHONE (OUTPATIENT)
Dept: HEMATOLOGY/ONCOLOGY | Facility: CLINIC | Age: 73
End: 2023-11-22
Payer: MEDICARE

## 2023-11-22 NOTE — TELEPHONE ENCOUNTER
Pt called would like to know does she need to see GI and Genetics before f/u with Dr. Chowdhury? Message sent to Dr. Chowhdury.

## 2023-11-24 ENCOUNTER — TELEPHONE (OUTPATIENT)
Dept: HEMATOLOGY/ONCOLOGY | Facility: CLINIC | Age: 73
End: 2023-11-24
Payer: MEDICARE

## 2023-11-24 LAB
CLINICAL CYTOGENETICIST REVIEW: NORMAL
CLL GENE MUT ANL BLD/T: NORMAL
CLL, FISH ADDITIONAL INFORMATION: NORMAL
CLL, FISH DISCLAIMER: NORMAL
CLL, FISH RELEASED BY: NORMAL
CLL, FISH RESULT SUMMARY: NORMAL
CLL, FISH RESULT TABLE: NORMAL
FCLL REASON FOR REFERRAL: NORMAL
FCLL SPECIMEN: NORMAL
REF LAB TEST METHOD: NORMAL
SPECIMEN SOURCE: NORMAL

## 2023-11-24 NOTE — TELEPHONE ENCOUNTER
----- Message from Jess Chowdhury MD sent at 11/23/2023 10:46 AM CST -----  Hi! Yes, she should see them first.  Thank you  ----- Message -----  From: Daphne Mathis LPN  Sent: 11/22/2023  11:57 AM CST  To: Jess Chowdhury MD    Good morning, pt called would like to know does she need to see GI and Genetics before f/u with you? She's scheduled to see GI on 12/14 no appt with Genetics yet and PET is scheduled next wk on the 29th.  Thanks, Stanley

## 2023-11-29 ENCOUNTER — HOSPITAL ENCOUNTER (OUTPATIENT)
Dept: RADIOLOGY | Facility: HOSPITAL | Age: 73
Discharge: HOME | End: 2023-11-29
Attending: INTERNAL MEDICINE
Payer: MEDICARE

## 2023-11-29 DIAGNOSIS — R79.89 ABNORMAL CBC: ICD-10-CM

## 2023-11-29 PROCEDURE — A9552 F18 FDG: HCPCS

## 2023-11-29 PROCEDURE — 78815 NM PET CT FDG SKULL BASE TO MID THIGH: ICD-10-PCS | Mod: 26,PI,, | Performed by: RADIOLOGY

## 2023-11-29 PROCEDURE — 78815 PET IMAGE W/CT SKULL-THIGH: CPT | Mod: 26,PI,, | Performed by: RADIOLOGY

## 2023-11-30 ENCOUNTER — TELEPHONE (OUTPATIENT)
Dept: INFUSION THERAPY | Facility: HOSPITAL | Age: 73
End: 2023-11-30
Payer: MEDICARE

## 2023-11-30 NOTE — TELEPHONE ENCOUNTER
----- Message from Jess Chowdhury MD sent at 11/30/2023  2:07 PM CST -----  Hey,    She has a significant family history of malignancy (Maternal GF: colon cancer; 2 aunts with breast cancer in their 30s; sister - Thyroid cancer; Sister - HL).   ----- Message -----  From: Daphne Mathis LPN  Sent: 11/27/2023  11:29 AM CST  To: Vesna Cisneros LPN; #      ----- Message -----  From: Vesna Cisneros LPN  Sent: 11/27/2023  10:39 AM CST  To: GERA Wall here referral states Abnormal CBC. Is it related to personl history of cancer or family history of cancer?  ----- Message -----  From: Daphne Mathis LPN  Sent: 11/24/2023  10:55 AM CST  To: Vesna Cisneros LPN    Good morning, can you get this pt scheduled with Albino Chowdhury would like her to be seen within the next couple of weeks if possible. Her PET is on 11/29 and is seeing GI 12/14 so if she can be seen by then would be great.  Dr. Chowdhury would like all of this completed prior to f/u with her.  Thanks, Stanley

## 2023-11-30 NOTE — TELEPHONE ENCOUNTER
Left a message for call back to schedule a new patient appointment with Genetics to see Albino Raphael MS.

## 2023-12-01 ENCOUNTER — TELEPHONE (OUTPATIENT)
Dept: GENETICS | Facility: CLINIC | Age: 73
End: 2023-12-01
Payer: MEDICARE

## 2023-12-01 NOTE — TELEPHONE ENCOUNTER
Spoke with Ms. Rahman and scheduled her New Patient In person visit with Genetics to see Albino Raphael MS for 12.19.23

## 2023-12-01 NOTE — TELEPHONE ENCOUNTER
----- Message from Timothy Arrington sent at 12/1/2023  1:58 PM CST -----  Contact: Patient  Type:  Patient Returning Call    Who Called:Josiah Perkins  Who Left Message for Patient: nurse   Does the patient know what this is regarding?: scheduling appt   Would the patient rather a call back or a response via [facility]?  Call back   Best Call Back Number: [phone]  Additional Information:

## 2023-12-01 NOTE — PROGRESS NOTES
3rd Req-Called Dr. Chan to f/u on request. Forwarded to Shayy HUNT to return call. PARKER faxed again for dm eye exam

## 2023-12-11 ENCOUNTER — TELEPHONE (OUTPATIENT)
Dept: HEPATOLOGY | Facility: CLINIC | Age: 73
End: 2023-12-11
Payer: MEDICARE

## 2023-12-11 NOTE — TELEPHONE ENCOUNTER
----- Message from Arianna Jacqueline sent at 12/11/2023  4:01 PM CST -----  Contact: pt  Pt is calling in regard to her suppose to have an upper and lower GI scheduled for this week.  Please call her back at [phone] thanks/mpd

## 2023-12-11 NOTE — TELEPHONE ENCOUNTER
Patient thought that she was scheduled for a egd on 12/14/23 I informed the patient that she's scheduled to see the provider on 12/14/23 at which point the scope can be ordered. She voiced understanding.

## 2023-12-14 ENCOUNTER — HOSPITAL ENCOUNTER (OUTPATIENT)
Dept: PREADMISSION TESTING | Facility: HOSPITAL | Age: 73
Discharge: HOME | End: 2023-12-14
Attending: COLON & RECTAL SURGERY
Payer: MEDICARE

## 2023-12-14 ENCOUNTER — OFFICE VISIT (OUTPATIENT)
Dept: GASTROENTEROLOGY | Facility: CLINIC | Age: 73
End: 2023-12-14
Payer: MEDICARE

## 2023-12-14 VITALS
SYSTOLIC BLOOD PRESSURE: 114 MMHG | HEIGHT: 60 IN | BODY MASS INDEX: 25.84 KG/M2 | DIASTOLIC BLOOD PRESSURE: 66 MMHG | WEIGHT: 131.63 LBS | HEART RATE: 80 BPM

## 2023-12-14 DIAGNOSIS — K30 INDIGESTION: ICD-10-CM

## 2023-12-14 DIAGNOSIS — K59.00 CONSTIPATION, UNSPECIFIED CONSTIPATION TYPE: ICD-10-CM

## 2023-12-14 DIAGNOSIS — Z12.11 SCREENING FOR COLON CANCER: Primary | ICD-10-CM

## 2023-12-14 DIAGNOSIS — R14.3 EXCESSIVE GAS: ICD-10-CM

## 2023-12-14 DIAGNOSIS — R14.0 BLOATING: ICD-10-CM

## 2023-12-14 PROCEDURE — 99999 PR PBB SHADOW E&M-EST. PATIENT-LVL IV: ICD-10-PCS | Mod: PBBFAC,,, | Performed by: NURSE PRACTITIONER

## 2023-12-14 PROCEDURE — 1126F PR PAIN SEVERITY QUANTIFIED, NO PAIN PRESENT: ICD-10-PCS | Mod: CPTII,S$GLB,, | Performed by: NURSE PRACTITIONER

## 2023-12-14 PROCEDURE — 3288F PR FALLS RISK ASSESSMENT DOCUMENTED: ICD-10-PCS | Mod: CPTII,S$GLB,, | Performed by: NURSE PRACTITIONER

## 2023-12-14 PROCEDURE — 1159F MED LIST DOCD IN RCRD: CPT | Mod: CPTII,S$GLB,, | Performed by: NURSE PRACTITIONER

## 2023-12-14 PROCEDURE — 1101F PT FALLS ASSESS-DOCD LE1/YR: CPT | Mod: CPTII,S$GLB,, | Performed by: NURSE PRACTITIONER

## 2023-12-14 PROCEDURE — 3074F PR MOST RECENT SYSTOLIC BLOOD PRESSURE < 130 MM HG: ICD-10-PCS | Mod: CPTII,S$GLB,, | Performed by: NURSE PRACTITIONER

## 2023-12-14 PROCEDURE — 1159F PR MEDICATION LIST DOCUMENTED IN MEDICAL RECORD: ICD-10-PCS | Mod: CPTII,S$GLB,, | Performed by: NURSE PRACTITIONER

## 2023-12-14 PROCEDURE — 3074F SYST BP LT 130 MM HG: CPT | Mod: CPTII,S$GLB,, | Performed by: NURSE PRACTITIONER

## 2023-12-14 PROCEDURE — 3078F PR MOST RECENT DIASTOLIC BLOOD PRESSURE < 80 MM HG: ICD-10-PCS | Mod: CPTII,S$GLB,, | Performed by: NURSE PRACTITIONER

## 2023-12-14 PROCEDURE — 3051F PR MOST RECENT HEMOGLOBIN A1C LEVEL 7.0 - < 8.0%: ICD-10-PCS | Mod: CPTII,S$GLB,, | Performed by: NURSE PRACTITIONER

## 2023-12-14 PROCEDURE — 99203 OFFICE O/P NEW LOW 30 MIN: CPT | Mod: S$GLB,,, | Performed by: NURSE PRACTITIONER

## 2023-12-14 PROCEDURE — 3008F PR BODY MASS INDEX (BMI) DOCUMENTED: ICD-10-PCS | Mod: CPTII,S$GLB,, | Performed by: NURSE PRACTITIONER

## 2023-12-14 PROCEDURE — 3066F PR DOCUMENTATION OF TREATMENT FOR NEPHROPATHY: ICD-10-PCS | Mod: CPTII,S$GLB,, | Performed by: NURSE PRACTITIONER

## 2023-12-14 PROCEDURE — 3288F FALL RISK ASSESSMENT DOCD: CPT | Mod: CPTII,S$GLB,, | Performed by: NURSE PRACTITIONER

## 2023-12-14 PROCEDURE — 3078F DIAST BP <80 MM HG: CPT | Mod: CPTII,S$GLB,, | Performed by: NURSE PRACTITIONER

## 2023-12-14 PROCEDURE — 3061F PR NEG MICROALBUMINURIA RESULT DOCUMENTED/REVIEW: ICD-10-PCS | Mod: CPTII,S$GLB,, | Performed by: NURSE PRACTITIONER

## 2023-12-14 PROCEDURE — 3008F BODY MASS INDEX DOCD: CPT | Mod: CPTII,S$GLB,, | Performed by: NURSE PRACTITIONER

## 2023-12-14 PROCEDURE — 3066F NEPHROPATHY DOC TX: CPT | Mod: CPTII,S$GLB,, | Performed by: NURSE PRACTITIONER

## 2023-12-14 PROCEDURE — 3061F NEG MICROALBUMINURIA REV: CPT | Mod: CPTII,S$GLB,, | Performed by: NURSE PRACTITIONER

## 2023-12-14 PROCEDURE — 1101F PR PT FALLS ASSESS DOC 0-1 FALLS W/OUT INJ PAST YR: ICD-10-PCS | Mod: CPTII,S$GLB,, | Performed by: NURSE PRACTITIONER

## 2023-12-14 PROCEDURE — 99999 PR PBB SHADOW E&M-EST. PATIENT-LVL IV: CPT | Mod: PBBFAC,,, | Performed by: NURSE PRACTITIONER

## 2023-12-14 PROCEDURE — 1126F AMNT PAIN NOTED NONE PRSNT: CPT | Mod: CPTII,S$GLB,, | Performed by: NURSE PRACTITIONER

## 2023-12-14 PROCEDURE — 3051F HG A1C>EQUAL 7.0%<8.0%: CPT | Mod: CPTII,S$GLB,, | Performed by: NURSE PRACTITIONER

## 2023-12-14 PROCEDURE — 99203 PR OFFICE/OUTPT VISIT, NEW, LEVL III, 30-44 MIN: ICD-10-PCS | Mod: S$GLB,,, | Performed by: NURSE PRACTITIONER

## 2023-12-14 RX ORDER — SODIUM, POTASSIUM,MAG SULFATES 17.5-3.13G
1 SOLUTION, RECONSTITUTED, ORAL ORAL DAILY
Qty: 1 KIT | Refills: 0 | Status: SHIPPED | OUTPATIENT
Start: 2023-12-14 | End: 2023-12-16

## 2023-12-14 NOTE — PROGRESS NOTES
Clinic Consult:  [facility] Consultation Note    Reason for Consult:  The primary encounter diagnosis was Screening for colon cancer. Diagnoses of Constipation, unspecified constipation type, Indigestion, Bloating, and Excessive gas were also pertinent to this visit.    PCP: Odell Andersen   [id number] [facility] / [place] [id number]    HPI:  This is a 73 y.o. female here for evaluation of the above  Pt referred by her oncologist to discuss a screening colonoscopy with an EGD  Pt states that she was recently diagnosed with CLL and is being managed by oncology.   She has a fam hx of CRC in her grandmother.   Her last colonoscopy 10 years ago without polyps.   She has had some recent alternating constipation with softer stools, but this is intermittent and has not caused issues with ADLs.  No melena or hematochezia.   Has had chronic indigestion and upper abdominal bloating and excess gas.   Pt requesting EGD at the time of the colonoscopy.         Review of Systems   Constitutional:  Negative for chills, fever, malaise/fatigue and weight loss.   Respiratory:  Negative for cough.    Cardiovascular:  Negative for chest pain.   Gastrointestinal:         Per HPI   Musculoskeletal:  Negative for myalgias.   Skin:  Negative for itching and rash.   Neurological:  Negative for headaches.   Psychiatric/Behavioral:  The patient is not nervous/anxious.        Medical History:   Past Medical History:   Diagnosis Date    CKD (chronic kidney disease) stage 3, GFR 30-59 ml/min     Diabetes mellitus, type 2     Hyperlipidemia        Surgical History:  Past Surgical History:   Procedure Laterality Date    APPENDECTOMY      BREAST CYST ASPIRATION Right     2019    BREAST CYST EXCISION Right     CATARACT EXTRACTION       SECTION      x 2    HYSTERECTOMY      SINUS SURGERY      TONSILLECTOMY         Family History:   Family History   Problem Relation Age of Onset    Alzheimer's disease Mother      Hyperlipidemia Mother     Thyroid cancer Sister     Heart failure Sister          at 42    Hodgkin's lymphoma Sister     Breast cancer Maternal Aunt     Breast cancer Maternal Aunt     Heart disease Maternal Grandmother     Colon cancer Maternal Grandfather     Heart disease Paternal Grandmother     Diabetes Neg Hx        Social History:   Social History     Tobacco Use    Smoking status: Never    Smokeless tobacco: Never   Substance Use Topics    Alcohol use: Yes     Comment: glass of wine every other day    Drug use: Never       Allergies: Reviewed    Home Medications:   Current Outpatient Medications on File Prior to Visit   Medication Sig Dispense Refill    atorvastatin (LIPITOR) 20 MG tablet TAKE 1 TABLET BY MOUTH EVERY EVENING 90 tablet 0    dicyclomine (BENTYL) 20 mg tablet TAKE 1 TABLET(20 MG) BY MOUTH FOUR TIMES DAILY BEFORE MEALS AND AT NIGHT 364 tablet 3    diphenhydrAMINE (BENADRYL) 25 mg capsule       erythromycin with ethanoL (EMGEL) 2 % gel AAA of face bid.  For rosacea/redness. 60 g 5    JARDIANCE 25 mg tablet TAKE 1 TABLET(25 MG) BY MOUTH EVERY DAY 90 tablet 1    multivitamin capsule Take 1 capsule by mouth once daily.      traZODone (DESYREL) 100 MG tablet TAKE 1 TABLET BY MOUTH EVERY NIGHT AT BEDTIME AS NEEDED FOR INSOMNIA 90 tablet 2    triamcinolone acetonide 0.025% (KENALOG) 0.025 % Oint AAA bid prn eczema near eyebrow. Use for 1-2 weeks then taper. Mild steroid. 15 g 1     No current facility-administered medications on file prior to visit.       Physical Exam:  Vital Signs:  /66 (BP Location: Left arm, Patient Position: Sitting, BP Method: Medium (Automatic))   Pulse 80   Ht 5' (1.524 m)   Wt 59.7 kg (131 lb 9.8 oz)   BMI 25.70 kg/m²   Body mass index is 25.7 kg/m².  Physical Exam  Vitals reviewed.   Constitutional:       Appearance: She is well-developed.   HENT:      Head: Normocephalic.   Eyes:      General: No scleral icterus.  Cardiovascular:      Rate and Rhythm: Normal  rate.   Pulmonary:      Effort: Pulmonary effort is normal.   Abdominal:      General: There is no distension.      Palpations: Abdomen is soft.   Musculoskeletal:         General: Normal range of motion.      Cervical back: Normal range of motion.   Skin:     General: Skin is dry.   Neurological:      Mental Status: She is alert and oriented to person, place, and time.         Labs: Pertinent labs reviewed.      Assessment:  1. Screening for colon cancer    2. Constipation, unspecified constipation type    3. Indigestion    4. Bloating    5. Excessive gas         Recommendations:  Stable without significant complaints.   Will plan for colonoscopy for screening  With the indigestion and upper abdominal discomfort, will also obtain an EGD for further evaluation.   PAT placed  Risks and benefits discussed.   Can try pepcid as needed for the indigestion.  Gas-x as needed for the bloating.       Follow up to be determined by results of above.          Thank you so much for allowing me to participate in the care of Josiah Reyez, HALLIE-C

## 2023-12-19 ENCOUNTER — OFFICE VISIT (OUTPATIENT)
Dept: GENETICS | Facility: CLINIC | Age: 73
End: 2023-12-19
Payer: MEDICARE

## 2023-12-19 DIAGNOSIS — R79.89 ABNORMAL CBC: ICD-10-CM

## 2023-12-19 DIAGNOSIS — Z80.3 FAMILY HISTORY OF BREAST CANCER: Primary | ICD-10-CM

## 2023-12-19 PROCEDURE — 96040 PR GENETIC COUNSELING, EACH 30 MIN: CPT | Mod: S$GLB,,,

## 2023-12-19 PROCEDURE — 99999 PR PBB SHADOW E&M-EST. PATIENT-LVL I: ICD-10-PCS | Mod: PBBFAC,,,

## 2023-12-19 PROCEDURE — 96040 PR GENETIC COUNSELING, EACH 30 MIN: ICD-10-PCS | Mod: S$GLB,,,

## 2023-12-19 PROCEDURE — 99999 PR PBB SHADOW E&M-EST. PATIENT-LVL I: CPT | Mod: PBBFAC,,,

## 2023-12-19 NOTE — PROGRESS NOTES
"  Cancer Genetics  Hereditary/High Risk Clinic  Department of Hematology and Oncology  [facility]        Date of Service:  2023  Provider:  Albino Raphael MS, [facility]    Patient Information  Name:  Josiah Perkins  :  1950  MRN:  [id number]        Referring Provider: Jess Chowdhury MD     The chief complaint leading to consultation is: as below.  Visit type: in-person.  Face-to-face time with patient: approximately 45 minutes.  Approximately 95 minutes in total were spent on this encounter, which includes face-to-face time and non-face-to-face time preparing to see the patient (e.g., review of tests), obtaining and/or reviewing separately obtained history, documenting clinical information in the electronic or other health record, independently interpreting results (not   reported) and communicating results to the patient/family/caregiver, or care coordination (not separately reported).      SUBJECTIVE:      Chief Complaint: Genetic Counseling     History of Present Illness (HPI):  Josiah Perkins ("Josiah"), 73 y.o., assigned female sex at birth is established with the [facility] Department of Hematology and Oncology but new to me.  She was referred by Medical Oncology for genetic cancer risk assessment given her family history of breast cancer. Recent labs were abnormal and found to be indicative for chronic lymphocytic leukemia. She will follow-up with the Heme-Onc physician on  to discuss this further.    Focused Medical History:   Germline cancer-genetic testing:  No  Cancer:  Yes  CLL, confirmed by labs (2023)  Colonoscopy: No  Most recent colonoscopy: 2014, told to repeat in 10  Colon polyp:  No  Mammogram: Yes  Most recent mammo: 2022  Breast MRI:  No  Other benign tumor:  Yes  Right breast cyst (5 years ago)  Uterine fibroids  Pancreatitis:  No  Pancreatic cyst:  No     Focused Surgical History  JAMES (30 years ago)  Both ovaries intact  Right breast cyst " aspiration    Ancestry:  Ashkenazi Church ancestry:  No  Paternal: Somali, Mongolian  Maternal: Somali,     Focused Family History:  Consanguinity in ancestors:  No  Germline cancer-genetic testing in blood relatives:  No  Cancer in family:  Yes; there are no known blood relatives affected with cancer other than those mentioned in the pedigree below    Family Cancer Pedigree:             Review of Systems:  See HPI.      SUBJECTIVE:   Records Reviewed  Medical History  Problem List  Any pertinent, available Procedures and Pathology reports in both [facility] and [facility]    COUNSELING   Causes of cancer  Germline cancer genetic testing is the testing of genes associated with cancer, known as cancer susceptibility genes.  Just as these genes are inherited from parents, mutations in these genes can be inherited, as well.  A mutation in a cancer susceptibility gene adversely affects the gene's ability to prevent cancer; therefore, carriers of cancer susceptibility gene mutations may be at increased risk for certain cancers.     Only 5-10% cancers are caused by a cancer susceptibility gene mutation, meaning the cancer is genetic/hereditary; rather, most cancers are sporadic.  Causes of sporadic cancers may include environmental risk factors, lifestyle risk factors, and non-modifiable risk factors.  It is important to note that members of a family often share not only their genetics but also risk factors including environmental and lifestyle risk factors, so cancers can be familial.    Mutations in BRCA1 and BRCA2 are associated with an increased risk for breast and ovarian cancer, in addition to male breast cancer, pancreatic, melanoma, and prostate cancer. Mutations in other genes may increase the risk of breast and prostate cancer, in addition to other cancers.     Potential results of genetic testing, and their implications  Potential results of genetic testing include positive, negative, and  variant of unknown significance (VUS).    A positive result indicates the presence of at least one clinically significant mutation, and the patient's associated cancer risks vary depending upon the cancer susceptibility gene(s) in which there is/are a mutation(s).  With a positive result, in some cases, depending upon the specific result and the patient's clinical history, modified risk management may be recommended, including measures for risk reduction and/or surveillance; however, modified management is not always an option.    A negative result indicates that no clinically significant mutations were identified in the gene(s) tested.    A VUS indicates that there is not presently enough data for the laboratory to make a determination as to whether the variant is clinically significant.  VUSs are not typically acted upon clinically.       The ability to interpret the meaning of a negative genetic testing result in genes associated with cancer with which the patient has not personally been affected, when done prior to testing the appropriate affected relative(s), is significantly limited.  A negative result in the patient does not indicate that she cannot develop cancer, and, in fact, the patient may even be at increased risk for cancer based on shared risk factors with affected relatives.  The most informative candidates for initial genetic testing in a family are those who have been affected with cancer.     Modified management may also be recommended, even with a result of no or unknown significance, based upon risk assessment that incorporates the family history.      Claudeer Shaunazik Score  Breast Cancer Risk Stratification  Current estimated lifetime risk of breast cancer, as calculated utilizing the Tyrer-Cuzick risk-assessment model v8.0b:  3.91%.  This places the patient in the average-risk range according to current clinical guidelines. As such, the NCCN guidelines recommend that she continue annual  mammogram.     Genetic mutation inheritance  If  Josiah tests positive for a mutation, her first-degree relatives would each have a 50% chance of having the same mutation, and other, more distantly related blood-relatives would also be at risk of having the same mutation.       Genetic discrimination  The Genetic Information Nondiscrimination Act (GAMAL) is U.S. federal legislation that provides some protections against use of an individual's genetic information by their health insurer and by their employer.  Title I of GAMAL prohibits most health insurers from utilizing an individual's genetic information to make decisions regarding insurance eligibility or premium charges.  Title II of GAMAL prohibits covered entities, including employers, from requesting the genetic information of employees and applicants.  GAMAL does not protect individuals from genetic discrimination toward health insurance obtained through a job with the  or through the Federal Employees Health Benefits Plan; from genetic discrimination by employers with fewer than 15 employees or if employed by the ; or from genetic discrimination by any other type of policies/entities, including but not limited to life insurance, disability insurance, long-term care insurance,  benefits, and [facility] benefits.     Genetic testing logistics  An outside laboratory would perform the testing after a blood sample is collected at [facility] or a saliva sample is collected by the patient at home.  With genetic testing, there is a potential for the patient to incur out-of-pocket costs.  Results can take 2-3 weeks.  Post-test genetic counseling can be conducted once the genetic testing results are available.     Assessment/Plan  Based on the information provided by  Josiah, she meets current criteria for genetic testing of  according to current clinical guidelines. Josiah's clinical history, including personal history and/or family history, is  suggestive of a hereditary cancer syndrome in the family given the family history of breast cancer in two 2nd degree-relatives, two maternal aunts, diagnosed before age 50.     Offered germline cancer-genetic testing at this time versus deferring testing at this time or declining testing altogether.  She would like to proceed with genetic testing. However, genetic testing options were not discussed. Given Josiah has not spoken with the Hematologist Oncologist, the CLL diagnosis has not been confirmed at this time. However, if the diagnosis is confirmed, the genetic testing will must be completed using a skin punch biopsy sample. She will be contacted to schedule the skin punch biopsy.     Questions were encouraged and answered to the patient's satisfaction, and she verbalized understanding of information and agreement with the plan.         Signed,    Albino Raphael MS, [facility] [place], [place]  Certified Genetic Counselor, Hereditary and High-Risk Clinic  Hematology/Oncology, [facility]    12/19/2023

## 2023-12-22 ENCOUNTER — OFFICE VISIT (OUTPATIENT)
Dept: HEMATOLOGY/ONCOLOGY | Facility: CLINIC | Age: 73
End: 2023-12-22
Payer: MEDICARE

## 2023-12-22 VITALS
HEART RATE: 78 BPM | HEIGHT: 60 IN | OXYGEN SATURATION: 96 % | SYSTOLIC BLOOD PRESSURE: 112 MMHG | RESPIRATION RATE: 18 BRPM | WEIGHT: 132.38 LBS | TEMPERATURE: 98 F | BODY MASS INDEX: 25.99 KG/M2 | DIASTOLIC BLOOD PRESSURE: 74 MMHG

## 2023-12-22 DIAGNOSIS — C91.10 CLL (CHRONIC LYMPHOCYTIC LEUKEMIA): ICD-10-CM

## 2023-12-22 DIAGNOSIS — D72.0 GENETIC ANOMALIES OF LEUKOCYTES: Primary | ICD-10-CM

## 2023-12-22 DIAGNOSIS — C91.10 CHRONIC LYMPHOCYTIC LEUKEMIA (CLL), CLINICAL STAGE A: ICD-10-CM

## 2023-12-22 PROCEDURE — 3288F FALL RISK ASSESSMENT DOCD: CPT | Mod: CPTII,S$GLB,, | Performed by: INTERNAL MEDICINE

## 2023-12-22 PROCEDURE — 1126F PR PAIN SEVERITY QUANTIFIED, NO PAIN PRESENT: ICD-10-PCS | Mod: CPTII,S$GLB,, | Performed by: INTERNAL MEDICINE

## 2023-12-22 PROCEDURE — 3074F PR MOST RECENT SYSTOLIC BLOOD PRESSURE < 130 MM HG: ICD-10-PCS | Mod: CPTII,S$GLB,, | Performed by: INTERNAL MEDICINE

## 2023-12-22 PROCEDURE — 3051F HG A1C>EQUAL 7.0%<8.0%: CPT | Mod: CPTII,S$GLB,, | Performed by: INTERNAL MEDICINE

## 2023-12-22 PROCEDURE — 1159F PR MEDICATION LIST DOCUMENTED IN MEDICAL RECORD: ICD-10-PCS | Mod: CPTII,S$GLB,, | Performed by: INTERNAL MEDICINE

## 2023-12-22 PROCEDURE — 99999 PR PBB SHADOW E&M-EST. PATIENT-LVL IV: CPT | Mod: PBBFAC,,, | Performed by: INTERNAL MEDICINE

## 2023-12-22 PROCEDURE — 3061F NEG MICROALBUMINURIA REV: CPT | Mod: CPTII,S$GLB,, | Performed by: INTERNAL MEDICINE

## 2023-12-22 PROCEDURE — 1101F PT FALLS ASSESS-DOCD LE1/YR: CPT | Mod: CPTII,S$GLB,, | Performed by: INTERNAL MEDICINE

## 2023-12-22 PROCEDURE — 99214 PR OFFICE/OUTPT VISIT, EST, LEVL IV, 30-39 MIN: ICD-10-PCS | Mod: S$GLB,,, | Performed by: INTERNAL MEDICINE

## 2023-12-22 PROCEDURE — 3288F PR FALLS RISK ASSESSMENT DOCUMENTED: ICD-10-PCS | Mod: CPTII,S$GLB,, | Performed by: INTERNAL MEDICINE

## 2023-12-22 PROCEDURE — 3078F DIAST BP <80 MM HG: CPT | Mod: CPTII,S$GLB,, | Performed by: INTERNAL MEDICINE

## 2023-12-22 PROCEDURE — 1101F PR PT FALLS ASSESS DOC 0-1 FALLS W/OUT INJ PAST YR: ICD-10-PCS | Mod: CPTII,S$GLB,, | Performed by: INTERNAL MEDICINE

## 2023-12-22 PROCEDURE — 3008F PR BODY MASS INDEX (BMI) DOCUMENTED: ICD-10-PCS | Mod: CPTII,S$GLB,, | Performed by: INTERNAL MEDICINE

## 2023-12-22 PROCEDURE — 3061F PR NEG MICROALBUMINURIA RESULT DOCUMENTED/REVIEW: ICD-10-PCS | Mod: CPTII,S$GLB,, | Performed by: INTERNAL MEDICINE

## 2023-12-22 PROCEDURE — 3078F PR MOST RECENT DIASTOLIC BLOOD PRESSURE < 80 MM HG: ICD-10-PCS | Mod: CPTII,S$GLB,, | Performed by: INTERNAL MEDICINE

## 2023-12-22 PROCEDURE — 99214 OFFICE O/P EST MOD 30 MIN: CPT | Mod: S$GLB,,, | Performed by: INTERNAL MEDICINE

## 2023-12-22 PROCEDURE — 3051F PR MOST RECENT HEMOGLOBIN A1C LEVEL 7.0 - < 8.0%: ICD-10-PCS | Mod: CPTII,S$GLB,, | Performed by: INTERNAL MEDICINE

## 2023-12-22 PROCEDURE — 3066F PR DOCUMENTATION OF TREATMENT FOR NEPHROPATHY: ICD-10-PCS | Mod: CPTII,S$GLB,, | Performed by: INTERNAL MEDICINE

## 2023-12-22 PROCEDURE — 99999 PR PBB SHADOW E&M-EST. PATIENT-LVL IV: ICD-10-PCS | Mod: PBBFAC,,, | Performed by: INTERNAL MEDICINE

## 2023-12-22 PROCEDURE — 1126F AMNT PAIN NOTED NONE PRSNT: CPT | Mod: CPTII,S$GLB,, | Performed by: INTERNAL MEDICINE

## 2023-12-22 PROCEDURE — 3008F BODY MASS INDEX DOCD: CPT | Mod: CPTII,S$GLB,, | Performed by: INTERNAL MEDICINE

## 2023-12-22 PROCEDURE — 3066F NEPHROPATHY DOC TX: CPT | Mod: CPTII,S$GLB,, | Performed by: INTERNAL MEDICINE

## 2023-12-22 PROCEDURE — 3074F SYST BP LT 130 MM HG: CPT | Mod: CPTII,S$GLB,, | Performed by: INTERNAL MEDICINE

## 2023-12-22 PROCEDURE — 1159F MED LIST DOCD IN RCRD: CPT | Mod: CPTII,S$GLB,, | Performed by: INTERNAL MEDICINE

## 2023-12-22 RX ORDER — VITAMIN E 268 MG
400 CAPSULE ORAL 2 TIMES DAILY
Qty: 120 CAPSULE | Refills: 5 | Status: SHIPPED | OUTPATIENT
Start: 2023-12-22 | End: 2024-12-21

## 2023-12-22 NOTE — PROGRESS NOTES
[facility]  [address] [address]  Phone: [phone];  Fax: [phone]    Patient ID: Josiah Perkins   Chief Complaint: Follow-up (Test results)  MRN:  [id number]     Oncologic Diagnosis: CLL  Current Treatment: Surveillance  Subjective   Ms. Perkins presents for follow up and to review her lab results.     She ha snot had any change in her health since our last visit.  Her testing confirmed the diagnosis of CLL.  I reviewed the diagnosis, treatment indications and potential side effects of first line therapy in detail.  She currently does not have an indication for treatment.  The only symptom that I am really concerned about is the fatigue but it seems mild and is not function limiting.    She has set up follow up for endoscopies.      Review of Systems:  Review of Systems   Constitutional:  Positive for fatigue. Negative for activity change, chills, diaphoresis, fever and unexpected weight change.   Eyes:  Negative for visual disturbance.   Respiratory:  Negative for shortness of breath.    Cardiovascular:  Negative for chest pain.   Gastrointestinal:  Positive for constipation (new). Negative for abdominal distention, abdominal pain, blood in stool, diarrhea, nausea and vomiting.        + indigestion and gas   Genitourinary:  Positive for frequency. Negative for difficulty urinating and hematuria.   Musculoskeletal:  Negative for myalgias. Back pain: scaly patch on the left eye.  Skin:  Positive for rash.   Neurological:  Negative for light-headedness and headaches.   Hematological:  Negative for adenopathy. Does not bruise/bleed easily.   Psychiatric/Behavioral:  The patient is nervous/anxious.      History     Oncology History    No history exists.       Past Medical History:   Diagnosis Date    CKD (chronic kidney disease) stage 3, GFR 30-59 ml/min     Diabetes mellitus, type 2     Hyperlipidemia        Past Surgical History:   Procedure Laterality Date     APPENDECTOMY      BREAST CYST ASPIRATION Right     2019    BREAST CYST EXCISION Right     CATARACT EXTRACTION       SECTION      x 2    HYSTERECTOMY      SINUS SURGERY      TONSILLECTOMY         Family History   Problem Relation Age of Onset    Alzheimer's disease Mother     Hyperlipidemia Mother     Thyroid cancer Sister     Heart failure Sister          at 42    Hodgkin's lymphoma Sister     Breast cancer Maternal Aunt     Breast cancer Maternal Aunt     Heart disease Maternal Grandmother     Colon cancer Maternal Grandfather     Heart disease Paternal Grandmother     Diabetes Neg Hx        Review of patient's allergies indicates:   Allergen Reactions    Codeine Nausea And Vomiting    Pcn [penicillins] Rash    Sulfa (sulfonamide antibiotics) Rash       Social History     Tobacco Use    Smoking status: Never    Smokeless tobacco: Never   Substance Use Topics    Alcohol use: Yes     Comment: glass of wine every other day    Drug use: Never       Physical Exam   ECOG:   ECOG SCORE    0 - Fully active-able to carry on all pre-disease performance without restriction          Vitals:  /74   Pulse 78   Temp 97.6 °F (36.4 °C)   Resp 18   Ht 5' (1.524 m)   Wt 60.1 kg (132 lb 6.2 oz)   SpO2 96%   BMI 25.85 kg/m²     Physical Exam:  Physical Exam  Constitutional:       General: She is not in acute distress.     Appearance: Normal appearance. She is not ill-appearing.   HENT:      Head: Normocephalic and atraumatic.   Eyes:      Extraocular Movements: Extraocular movements intact.      Conjunctiva/sclera: Conjunctivae normal.   Cardiovascular:      Rate and Rhythm: Normal rate and regular rhythm.      Heart sounds: No murmur heard.  Pulmonary:      Effort: Pulmonary effort is normal. No respiratory distress.      Breath sounds: Normal breath sounds. No wheezing, rhonchi or rales.   Abdominal:      General: Bowel sounds are normal. There is no distension.      Palpations: Abdomen is soft. There is no  hepatomegaly, splenomegaly or mass.      Tenderness: There is no abdominal tenderness. There is no guarding.   Musculoskeletal:         General: Normal range of motion.      Cervical back: Neck supple. No rigidity or tenderness.      Right lower leg: No edema.      Left lower leg: No edema.   Lymphadenopathy:      Head:      Right side of head: No submental, submandibular, preauricular, posterior auricular or occipital adenopathy.      Left side of head: No submental, submandibular, preauricular, posterior auricular or occipital adenopathy.      Cervical: No cervical adenopathy.      Upper Body:      Right upper body: No supraclavicular or axillary adenopathy.      Left upper body: No supraclavicular or axillary adenopathy.      Lower Body: No right inguinal adenopathy. No left inguinal adenopathy.   Skin:     Findings: No rash.   Neurological:      General: No focal deficit present.      Mental Status: She is alert and oriented to person, place, and time.   Psychiatric:         Mood and Affect: Mood normal.         Behavior: Behavior normal.         Thought Content: Thought content normal.            Labs   Labs:  No visits with results within 2 Day(s) from this visit.   Latest known visit with results is:   Lab Visit on 11/17/2023   Component Date Value Ref Range Status    CLL, FISH Result summary 11/17/2023 Abnormal   Final    CLL INTERPRETATION 11/17/2023 SEE BELOW   Final    Comment: The result is abnormal and indicates trisomy 12 in 50% of  nuclei and a 13q deletion of both chromosomes 13 in 49% of  nuclei.    In CLL, this combination of abnormalities is associated  with an intermediate prognosis (Miranda et al., Blood  127:0363-5577, 2016; Vincenzo et al., Brit J Haematol  173:105-113, 2016).    Clinical and pathologic correlation is recommended.    For monitoring response to therapy, FISH for chromosomes 12  and 13 is available, test CLLMF [CLL, Specified FISH].      CLL, FISH Result Table 11/17/2023 SEE  BELOW   Final    Comment: ----------------------------------------------------------   Abnormality Name                Result     Abn%   Cutoff%    +12(D12Z3,MDM2)x3               Abnormal   50     <7.0       -13q14.3x2(E32D300d1,RADP4a9)   Abnormal   49     <5.0       -6q23(D6Z1x2,MYBx1)             Normal            <12.0      -11q22.3(L20L5c9,ATMx1)         Normal            <12.0      -13q14.3(V67H114h8,FCIY7b9)     Normal            <12.0      t(11;14) CCND1-XT/IGH-XT fusion Normal            <4.0       -17p13.1(TP53x1,Z89H1b4)        Normal            <15.0      -17(TP53,D17Z1)x1               Normal            <10.0      ----------------------------------------------------------       CLL RESULT 11/17/2023 SEE BELOW   Final    nuc raiza(D12Z3,MDM2)x3[50/100],(Z09V434c6,UYHH6c7)[49/100]    CLL REASON FOR REFERRAL 11/17/2023 lymphocytosis/smudge cells   Final    CLL SPECIMEN 11/17/2023 Blood   Final    CLL SPECIMEN SOURCE 11/17/2023 Test Not Performed   Corrected    CLL METHOD 11/17/2023 SEE BELOW   Final    Comment: Locus and probes                  [Strategy;#Nuclei;Vendor]  -----------------------------------------------------------  6CEN(D6Z1),6q23.3(MYB)                   [COPY#;100;LDT/AM]  11CEN(D11Z1),11q22.3(EUNICE)                    [COPY#;100;AM]  11q13(CCND1-XT),14q32(IGH-XT)                [DFISH;200;AM]  12CEN(D12Z3),12q15(MDM2)                     [COPY#;100;AM]  13q14.3(D29N526),13q34(LAMP1)                [COPY#;100;AM]  17p13.1(TP53),17CEN(D17Z1)                   [COPY#;100;AM]    Probe strategies include:  DFISH=dual color, double fusion;  COPY#=region gain and loss.  Scoring Method: Manual    Probe vendors include:  T = [facility] Developed  AM = [employer] ([place], [place])      CLL, FISH Additional Information 11/17/2023 Test Not Performed   Final    CLL, FISH Disclaimer 11/17/2023 SEE BELOW   Final    Comment: Applicable to Analyte Specific Reagent (ASR) and  Laboratory  Developed Tests (LDT). This test was developed and its  performance characteristics determined by [facility] in a  manner consistent with CLIA requirements. It has not been  cleared or approved by the U.S. Food and Drug  Administration. This FISH test does not rule out other  chromosome abnormalities.      CLL, FISH Released by 11/17/2023 Vanessa Hines D.O.   Final    Comment: Test Performed by:  [facility] [place] [place] [address]  : Gerard Huffman M.D. Ph.D.; CLIA# [id number]      Beta-2 Microglobulin 11/17/2023 2.1  0.0 - 2.5 ug/mL Final    LD 11/17/2023 158  110 - 260 U/L Final    Results are increased in hemolyzed samples.    Hep B Core Total Ab 11/17/2023 Non-reactive  Non-reactive Final    Hepatitis B Surface Ag 11/17/2023 Non-reactive  Non-reactive Final    HBe Ag 11/17/2023 Nonreactive  Nonreactive Final    Comment: Test performed at [facility],  [address] [place]  [address]     [phone]  Gabrielle Elder MD, PhD - Medical Director      Hep B S Ab 11/17/2023 <3.00  mIU/mL Final    Hep B S Ab 11/17/2023 Non-reactive   Final    Individual is considered not immune to HBV infection.        Imaging   NM PET CT FDG Skull Base to Mid Thigh 11/29/23  Order: [id number]  Status: Final result       Visible to patient: Yes (seen)       Next appt: 01/02/2024 at 10:20 AM in Radiology ([facility] MAMMO1-SCR)       Dx: Abnormal CBC    0 Result Notes  Details    Reading Physician Reading Date Result Priority   Hernando Spivey MD  [phone] 11/29/2023 STAT     Narrative & Impression  EXAMINATION:  NM PET CT FDG SKULL BASE TO MID THIGH     CLINICAL HISTORY:  B-cell lymphoproliferative disorder; assess for lymphadenopathy; Other specified abnormal findings of blood chemistry     TECHNIQUE:  11.51 mCi of F18-FDG was administered intravenously in the left antecubital fossa.  After an approximately 60 min  distribution time, PET/CT images were acquired from the skull base to the mid thigh. Transmission images were acquired to correct for attenuation using a whole body low-dose CT scan without contrast with the arms positioned above the head. Glycemia at the time of injection was 147 mg/dL.     COMPARISON:  None     FINDINGS:  Quality of the study: Adequate.     SUV max of the liver parenchyma is 2.6.  SUV max of mediastinal blood pool is 2.1.     Neck: No lymphadenopathy.  No mass or abnormal FDG avidity.     Chest: No FDG avid mediastinal, hilar, or axillary lymphadenopathy.  Clear lungs.  No pleural effusion.     Abdomen/pelvis: No ascites or lymphadenopathy or mass.  No abnormal FDG avidity.     Skeletal structures: No abnormal FDG avidity.  No focal lytic or sclerotic lesion.     Physiologic uptake of the tracer is present within the brain, salivary glands, myocardium, GI and  tracts.     Incidental CT findings: N/A     Impression:     No lymphadenopathy or abnormal FDG avidity on this exam.  Normal PET-CT.     All CT scans at this facility are performed  using dose modulation techniques as appropriate to performed exam including the following:  automated exposure control; adjustment of mA and/or kV according to the patients size (this includes techniques or standardized protocols for targeted exams where dose is matched to indication/reason for exam: i.e. extremities or head);  iterative reconstruction technique.         Assessment and Plan   CLL - Clinical Stage A  CLL-IPI Intermediate Risk (trisomy 12 in 50% of  nuclei and a 13q deletion of both chromosomes 13 in 49% of   nuclei. )  CLL FISH 11/17/23 confirmed the diagnosis; will defer bone marrow biopsy at this time  PET-CT showed no LAD   Follow up with genetic counselor  I discussed the diagnosis, indications for treatment, and significant side effects of BTK inhibitors; I provided her with and informational handout on her diagnosis  No indication for  treatment at this time (she has mild fatigue that is not function limiting)      Cancer Screening  MMG: Scheduled  PAP Smear: Hx hysterectomy  Colonoscopy 05/24/14: at [facility]; random bx taken for microscopic colitis and negative; colon normal; repeat 10 years - Refer to GI for C-scope and EGD given change in bowel habits as well as new UGI sx  CT Chest        Chronic Medical Conditions  CKD IIIa  DM II  HLD  GERD  IBS      Med Onc Chart Routing      Follow up with physician 6 weeks.   Follow up with MARKO    Infusion scheduling note    Injection scheduling note    Labs CBC   Scheduling:  Preferred lab:  Lab interval:     Imaging    Pharmacy appointment    Other referrals                 The patient was seen, interviewed and examined. Pertinent lab and radiologic studies were reviewed. Pt instructed to call should they develop concerning signs/symptoms or have further questions.        Portions of the record may have been created with voice recognition software. Occasional wrong-word or sound-a-like substitutions may have occurred due to the inherent limitations of voice recognition software. Read the chart carefully and recognize, using context, where substitutions have occurred.      Jess Verdin MD    Hematology/Oncology

## 2024-01-02 ENCOUNTER — HOSPITAL ENCOUNTER (OUTPATIENT)
Dept: RADIOLOGY | Facility: HOSPITAL | Age: 74
Discharge: HOME | End: 2024-01-02
Attending: FAMILY MEDICINE
Payer: MEDICARE

## 2024-01-02 VITALS — WEIGHT: 132.25 LBS | BODY MASS INDEX: 25.97 KG/M2 | HEIGHT: 60 IN

## 2024-01-02 DIAGNOSIS — Z12.31 ENCOUNTER FOR SCREENING MAMMOGRAM FOR MALIGNANT NEOPLASM OF BREAST: ICD-10-CM

## 2024-01-02 PROCEDURE — 77067 SCR MAMMO BI INCL CAD: CPT | Mod: 26,,, | Performed by: RADIOLOGY

## 2024-01-02 PROCEDURE — 77067 SCR MAMMO BI INCL CAD: CPT | Mod: TC

## 2024-01-02 PROCEDURE — 77063 BREAST TOMOSYNTHESIS BI: CPT | Mod: 26,,, | Performed by: RADIOLOGY

## 2024-01-02 NOTE — PROGRESS NOTES
Your mammogram is normal.  Repeat screening is recommended in one year.    Sincerely,    Gerard Bradley M.D.        If you would like to review your experience with Dr. Bradley or [facility], please follow the link below:    [email or web address]

## 2024-01-09 ENCOUNTER — HOSPITAL ENCOUNTER (OUTPATIENT)
Facility: HOSPITAL | Age: 74
Discharge: HOME | End: 2024-01-09
Attending: INTERNAL MEDICINE | Admitting: INTERNAL MEDICINE
Payer: MEDICARE

## 2024-01-09 ENCOUNTER — ANESTHESIA EVENT (OUTPATIENT)
Dept: ENDOSCOPY | Facility: HOSPITAL | Age: 74
End: 2024-01-09
Payer: MEDICARE

## 2024-01-09 ENCOUNTER — ANESTHESIA (OUTPATIENT)
Dept: ENDOSCOPY | Facility: HOSPITAL | Age: 74
End: 2024-01-09
Payer: MEDICARE

## 2024-01-09 DIAGNOSIS — K30 INDIGESTION: Primary | ICD-10-CM

## 2024-01-09 DIAGNOSIS — Z12.11 SCREENING FOR COLON CANCER: ICD-10-CM

## 2024-01-09 LAB
GLUCOSE SERPL-MCNC: 145 MG/DL (ref 70–110)
POCT GLUCOSE: 153 MG/DL (ref 70–110)

## 2024-01-09 PROCEDURE — 88342 IMHCHEM/IMCYTCHM 1ST ANTB: CPT | Performed by: STUDENT IN AN ORGANIZED HEALTH CARE EDUCATION/TRAINING PROGRAM

## 2024-01-09 PROCEDURE — 37000009 HC ANESTHESIA EA ADD 15 MINS: Performed by: INTERNAL MEDICINE

## 2024-01-09 PROCEDURE — 37000008 HC ANESTHESIA 1ST 15 MINUTES: Performed by: INTERNAL MEDICINE

## 2024-01-09 PROCEDURE — 63600175 PHARM REV CODE 636 W HCPCS

## 2024-01-09 PROCEDURE — 43239 EGD BIOPSY SINGLE/MULTIPLE: CPT | Performed by: INTERNAL MEDICINE

## 2024-01-09 PROCEDURE — G0121 COLON CA SCRN NOT HI RSK IND: HCPCS | Mod: ,,, | Performed by: INTERNAL MEDICINE

## 2024-01-09 PROCEDURE — 43239 EGD BIOPSY SINGLE/MULTIPLE: CPT | Mod: 51,,, | Performed by: INTERNAL MEDICINE

## 2024-01-09 PROCEDURE — 88305 TISSUE EXAM BY PATHOLOGIST: CPT | Mod: 59 | Performed by: STUDENT IN AN ORGANIZED HEALTH CARE EDUCATION/TRAINING PROGRAM

## 2024-01-09 PROCEDURE — 25000003 PHARM REV CODE 250

## 2024-01-09 PROCEDURE — 27201012 HC FORCEPS, HOT/COLD, DISP: Performed by: INTERNAL MEDICINE

## 2024-01-09 PROCEDURE — G0121 COLON CA SCRN NOT HI RSK IND: HCPCS | Performed by: INTERNAL MEDICINE

## 2024-01-09 PROCEDURE — 88305 TISSUE EXAM BY PATHOLOGIST: CPT | Mod: 26,,, | Performed by: STUDENT IN AN ORGANIZED HEALTH CARE EDUCATION/TRAINING PROGRAM

## 2024-01-09 PROCEDURE — 88342 IMHCHEM/IMCYTCHM 1ST ANTB: CPT | Mod: 26,,, | Performed by: STUDENT IN AN ORGANIZED HEALTH CARE EDUCATION/TRAINING PROGRAM

## 2024-01-09 RX ORDER — LIDOCAINE HYDROCHLORIDE 10 MG/ML
INJECTION, SOLUTION EPIDURAL; INFILTRATION; INTRACAUDAL; PERINEURAL
Status: DISCONTINUED | OUTPATIENT
Start: 2024-01-09 | End: 2024-01-09

## 2024-01-09 RX ORDER — SODIUM CHLORIDE, SODIUM LACTATE, POTASSIUM CHLORIDE, CALCIUM CHLORIDE 600; 310; 30; 20 MG/100ML; MG/100ML; MG/100ML; MG/100ML
INJECTION, SOLUTION INTRAVENOUS CONTINUOUS PRN
Status: DISCONTINUED | OUTPATIENT
Start: 2024-01-09 | End: 2024-01-09

## 2024-01-09 RX ORDER — OMEPRAZOLE 20 MG/1
20 CAPSULE, DELAYED RELEASE ORAL DAILY
Qty: 90 CAPSULE | Refills: 3 | Status: SHIPPED | OUTPATIENT
Start: 2024-01-09 | End: 2025-01-08

## 2024-01-09 RX ORDER — PROPOFOL 10 MG/ML
VIAL (ML) INTRAVENOUS
Status: DISCONTINUED | OUTPATIENT
Start: 2024-01-09 | End: 2024-01-09

## 2024-01-09 RX ADMIN — PROPOFOL 30 MG: 10 INJECTION, EMULSION INTRAVENOUS at 10:01

## 2024-01-09 RX ADMIN — LIDOCAINE HYDROCHLORIDE 80 MG: 10 SOLUTION INTRAVENOUS at 10:01

## 2024-01-09 RX ADMIN — PROPOFOL 100 MG: 10 INJECTION, EMULSION INTRAVENOUS at 10:01

## 2024-01-09 RX ADMIN — PROPOFOL 20 MG: 10 INJECTION, EMULSION INTRAVENOUS at 10:01

## 2024-01-09 RX ADMIN — PROPOFOL 40 MG: 10 INJECTION, EMULSION INTRAVENOUS at 11:01

## 2024-01-09 RX ADMIN — SODIUM CHLORIDE, SODIUM LACTATE, POTASSIUM CHLORIDE, AND CALCIUM CHLORIDE: 600; 310; 30; 20 INJECTION, SOLUTION INTRAVENOUS at 10:01

## 2024-01-09 RX ADMIN — PROPOFOL 40 MG: 10 INJECTION, EMULSION INTRAVENOUS at 10:01

## 2024-01-09 NOTE — H&P
PRE PROCEDURE H&P    Patient Name: Josiah Perkins  MRN: [id number]  : 1950  Date of Procedure:  2024  Referring Physician: Kristy Reyez FNP  Primary Physician: Odell Andersen MD  Procedure Physician: Yane Yancey MD       Planned Procedure: Colonoscopy and EGD  Diagnosis: screening for colon cancer, constipation and indigestion and bloating    Chief Complaint: Same as above    HPI: Patient is an 73 y.o. female is here for the above. Seen by Ms. Wagner Aissatou STERLING for bloating, indigestion and constipation 2023. Last colonoscopy >10 years ago and no polyps. Remote hx of maternal GF with CRC. Admits to constipation. She is not on a acid blocker. Pepcid was recommended when seen in GI clinic but she has not tried it. No recent weight loss, dysphagia or overt GI bleeding. EGD decades ago. She cannot recall why she had the study performed. No blood thinners. No family at bedside.    Last colonoscopy:   Family history: maternal GF  Anticoagulation: none    Past Medical History:   Past Medical History:   Diagnosis Date    CKD (chronic kidney disease) stage 3, GFR 30-59 ml/min     Diabetes mellitus, type 2     Hyperlipidemia         Past Surgical History:  Past Surgical History:   Procedure Laterality Date    APPENDECTOMY      BREAST CYST ASPIRATION Right     2019    BREAST CYST EXCISION Right     CATARACT EXTRACTION       SECTION      x 2    HYSTERECTOMY      SINUS SURGERY      TONSILLECTOMY          Home Medications:  Prior to Admission medications    Medication Sig Start Date End Date Taking? Authorizing Provider   atorvastatin (LIPITOR) 20 MG tablet TAKE 1 TABLET BY MOUTH EVERY EVENING 10/16/23  Yes Odell Andersen MD   dicyclomine (BENTYL) 20 mg tablet TAKE 1 TABLET(20 MG) BY MOUTH FOUR TIMES DAILY BEFORE MEALS AND AT NIGHT 23  Yes Brittni Lea FNP-C   diphenhydrAMINE (BENADRYL) 25 mg capsule    Yes Provider, Historical   erythromycin with ethanoL (EMGEL) 2 % gel AAA of face  bid.  For rosacea/redness. 10/16/23  Yes Keya Curtis MD   JARDIANCE 25 mg tablet TAKE 1 TABLET(25 MG) BY MOUTH EVERY DAY 11/13/23  Yes Odell Andersen MD   multivitamin capsule Take 1 capsule by mouth once daily.   Yes Provider, Wilmer   traZODone (DESYREL) 100 MG tablet TAKE 1 TABLET BY MOUTH EVERY NIGHT AT BEDTIME AS NEEDED FOR INSOMNIA 9/18/23  Yes Odell Andersen MD   triamcinolone acetonide 0.025% (KENALOG) 0.025 % Oint AAA bid prn eczema near eyebrow. Use for 1-2 weeks then taper. Mild steroid. 7/10/23   Keya Curtis MD   vitamin E 400 UNIT capsule Take 1 capsule (400 Units total) by mouth 2 (two) times daily. 12/22/23 12/21/24  Jess Chowdhury MD        Allergies:  Review of patient's allergies indicates:   Allergen Reactions    Codeine Nausea And Vomiting    Pcn [penicillins] Rash    Sulfa (sulfonamide antibiotics) Rash        Social History:   Social History     Socioeconomic History    Marital status:     Number of children: 2   Occupational History    Occupation: Retired School councellor   Tobacco Use    Smoking status: Never    Smokeless tobacco: Never   Substance and Sexual Activity    Alcohol use: Yes     Comment: glass of wine every other day    Drug use: Never    Sexual activity: Not Currently   Other Topics Concern    Are you pregnant or think you may be? No    Breast-feeding No     Social Determinants of Health     Financial Resource Strain: Low Risk  (11/15/2023)    Overall Financial Resource Strain (CARDIA)     Difficulty of Paying Living Expenses: Not very hard   Food Insecurity: No Food Insecurity (11/15/2023)    Hunger Vital Sign     Worried About Running Out of Food in the Last Year: Never true     Ran Out of Food in the Last Year: Never true   Transportation Needs: No Transportation Needs (11/15/2023)    PRAPARE - Transportation     Lack of Transportation (Medical): No     Lack of Transportation (Non-Medical): No   Physical Activity: Insufficiently Active  (11/15/2023)    Exercise Vital Sign     Days of Exercise per Week: 2 days     Minutes of Exercise per Session: 30 min   Stress: No Stress Concern Present (11/15/2023)    [employer] [place] [employer] - Occupational Stress Questionnaire     Feeling of Stress : Only a little   Social Connections: Unknown (11/15/2023)    Social Connection and Isolation Panel [NHANES]     Frequency of Communication with Friends and Family: More than three times a week     Frequency of Social Gatherings with Friends and Family: More than three times a week     Active Member of Clubs or Organizations: Yes     Marital Status:    Housing Stability: Low Risk  (11/15/2023)    Housing Stability Vital Sign     Unable to Pay for Housing in the Last Year: No     Number of Places Lived in the Last Year: 1     Unstable Housing in the Last Year: No       Family History:  Family History   Problem Relation Age of Onset    Alzheimer's disease Mother     Hyperlipidemia Mother     Thyroid cancer Sister     Heart failure Sister          at 42    Hodgkin's lymphoma Sister     Breast cancer Maternal Aunt     Breast cancer Maternal Aunt     Heart disease Maternal Grandmother     Colon cancer Maternal Grandfather     Heart disease Paternal Grandmother     Diabetes Neg Hx        ROS: No acute cardiac events, no acute respiratory complaints.     Physical Exam (all patients):    BP (!) 118/58 (BP Location: Left arm, Patient Position: Lying)   Pulse 79   Temp 97.5 °F (36.4 °C) (Temporal)   Resp 18   Ht 5' (1.524 m)   Wt 59.9 kg (132 lb)   SpO2 99%   Breastfeeding No   BMI 25.78 kg/m²   Lungs: Clear to auscultation bilaterally, respirations unlabored  Heart: Regular rate and rhythm, S1 and S2 normal, no obvious murmurs  Abdomen:         Soft, non-tender, bowel sounds normal, no masses, no organomegaly    Lab Results   Component Value Date    WBC 11.77 2023    MCV 99 (H) 2023    RDW 12.7 2023     2023      (H) 11/01/2023    HGBA1C 7.1 (H) 11/01/2023    BUN 17 11/01/2023     11/01/2023    K 3.9 11/01/2023     11/01/2023        SEDATION PLAN: per anesthesia      History reviewed, vital signs satisfactory, cardiopulmonary status satisfactory, sedation options, risks and plans have been discussed with the patient  All their questions were answered and the patient agrees to the sedation procedures as planned and the patient is deemed an appropriate candidate for the sedation as planned.    The risks, benefits and alternatives of the procedure were discussed with the patient in detail. This discussion was had in the presence of endoscopy staff. The risks include, risks of adverse reaction to sedation requiring the use of reversal agents, bleeding requiring blood transfusion, perforation requiring surgical intervention and technical failure. Other risks include aspiration leading to respiratory distress and respiratory failure resulting in endotracheal intubation and mechanical ventilation including death. If anesthesia is being utilized for this procedure, it is up to the anesthesiologist to determine airway safety including elective endotracheal intubation. Questions were answered, they agree to proceed. There was no language barriers.      Procedure explained to patient, informed consent obtained and placed in chart.    Yane Yancey  1/9/2024  10:40 AM

## 2024-01-09 NOTE — PROVATION PATIENT INSTRUCTIONS
Discharge Summary/Instructions after an Endoscopic Procedure  Patient Name: Josiah Perkins  Patient MRN: [id number]  Patient YOB: 1950  Tuesday, January 9, 2024 Yane Yancey MD  Dear patient,  As a result of recent federal legislation (The Federal Cures Act), you may   receive lab or pathology results from your procedure in your [facility]   account before your physician is able to contact you. Your physician or   their representative will relay the results to you with their   recommendations at their soonest availability.  Thank you,  RESTRICTIONS:  During your procedure today, you received medications for sedation.  These   medications may affect your judgment, balance and coordination.  Therefore,   for 24 hours, you have the following restrictions:   - DO NOT drive a car, operate machinery, make legal/financial decisions,   sign important papers or drink alcohol.    ACTIVITY:  Today: no heavy lifting, straining or running due to procedural   sedation/anesthesia.  The following day: return to full activity including work.  DIET:  Eat and drink normally unless instructed otherwise.     TREATMENT FOR COMMON SIDE EFFECTS:  - Mild abdominal pain, nausea, belching, bloating or excessive gas:  rest,   eat lightly and use a heating pad.  - Sore Throat: treat with throat lozenges and/or gargle with warm salt   water.  - Because air was used during the procedure, expelling large amounts of air   from your rectum or belching is normal.  - If a bowel prep was taken, you may not have a bowel movement for 1-3 days.    This is normal.  SYMPTOMS TO WATCH FOR AND REPORT TO YOUR PHYSICIAN:  1. Abdominal pain or bloating, other than gas cramps.  2. Chest pain.  3. Back pain.  4. Signs of infection such as: chills or fever occurring within 24 hours   after the procedure.  5. Rectal bleeding, which would show as bright red, maroon, or black stools.   (A tablespoon of blood from the rectum is not serious, especially if    hemorrhoids are present.)  6. Vomiting.  7. Weakness or dizziness.  GO DIRECTLY TO THE NEAREST EMERGENCY ROOM IF YOU HAVE ANY OF THE FOLLOWING:      Difficulty breathing              Chills and/or fever over 101 F   Persistent vomiting and/or vomiting blood   Severe abdominal pain   Severe chest pain   Black, tarry stools   Bleeding- more than one tablespoon   Any other symptom or condition that you feel may need urgent attention  Your doctor recommends these additional instructions:  If any biopsies were taken, your doctors clinic will contact you in 1 to 2   weeks with any results.  - Discharge patient to home (with escort).   - Resume previous diet.   - Continue present medications.   - Repeat colonoscopy in 10 years for screening purposes.   - Return to primary care physician.  For questions, problems or results please call your physician Yane Yancey MD at Work:  [phone]  If you have any questions about the above instructions, call the GI   department at [phone] or call the endoscopy unit at [phone]   from 7am until 3 pm.  [facility] [place], EMERGENCY ROOM PHONE NUMBER:   [phone]  IF A COMPLICATION OR EMERGENCY SITUATION ARISES AND YOU ARE UNABLE TO REACH   YOUR PHYSICIAN - GO DIRECTLY TO THE EMERGENCY ROOM.  I have read or have had read to me these discharge instructions for my   procedure and have received a written copy.  I understand these   instructions and will follow-up with my physician if I have any questions.     __________________________________       _____________________________________  Nurse Signature                                          Patient/Designated   Responsible Party Signature  MD Yane Gonzalez MD  1/9/2024 11:23:00 AM  This report has been verified and signed electronically.  Dear patient,  As a result of recent federal legislation (The Federal Cures Act), you may   receive lab or pathology results from your  procedure in your [facility]   account before your physician is able to contact you. Your physician or   their representative will relay the results to you with their   recommendations at their soonest availability.  Thank you,  PROVATION

## 2024-01-09 NOTE — ANESTHESIA PREPROCEDURE EVALUATION
2024  Josiah Perkins is a 73 y.o., female.    Patient Active Problem List   Diagnosis    Uncontrolled type 2 diabetes mellitus with hyperglycemia    Dyslipidemia associated with type 2 diabetes mellitus    Poison ivy dermatitis    Chronic lymphocytic leukemia (CLL), clinical stage A     Past Medical History:   Diagnosis Date    CKD (chronic kidney disease) stage 3, GFR 30-59 ml/min     Diabetes mellitus, type 2     Hyperlipidemia      Past Surgical History:   Procedure Laterality Date    APPENDECTOMY      BREAST CYST ASPIRATION Right     2019    BREAST CYST EXCISION Right     CATARACT EXTRACTION       SECTION      x 2    HYSTERECTOMY      SINUS SURGERY      TONSILLECTOMY           Pre-op Assessment    I have reviewed the Patient Summary Reports.    I have reviewed the NPO Status.   I have reviewed the Medications.     Review of Systems  Anesthesia Hx:  No problems with previous Anesthesia             Denies Family Hx of Anesthesia complications.    Denies Personal Hx of Anesthesia complications.                    Social:  Non-Smoker, No Alcohol Use       Cardiovascular:  Exercise tolerance: good              hyperlipidemia                             Pulmonary:  Pulmonary Normal                       Renal/:  Chronic Renal Disease, CKD                Hepatic/GI:  Hepatic/GI Normal                 Neurological:  Neurology Normal                                      Endocrine:  Diabetes, type 2               Physical Exam  General: Cooperative and Alert    Airway:  Mallampati: II   Mouth Opening: Normal  TM Distance: Normal  Tongue: Normal  Neck ROM: Normal ROM    Dental:  Intact            Anesthesia Plan  Type of Anesthesia, risks & benefits discussed:    Anesthesia Type: MAC, Gen Natural Airway  Intra-op Monitoring Plan: Standard ASA Monitors  Induction:  IV  Informed Consent: Informed consent  signed with the Patient and all parties understand the risks and agree with anesthesia plan.  All questions answered.   ASA Score: 3  Day of Surgery Review of History & Physical: H&P Update referred to the surgeon/provider.    Ready For Surgery From Anesthesia Perspective.     .

## 2024-01-09 NOTE — PROVATION PATIENT INSTRUCTIONS
Discharge Summary/Instructions after an Endoscopic Procedure  Patient Name: Josiah Perkins  Patient MRN: [id number]  Patient YOB: 1950  Tuesday, January 9, 2024 Yane Yancey MD  Dear patient,  As a result of recent federal legislation (The Federal Cures Act), you may   receive lab or pathology results from your procedure in your [facility]   account before your physician is able to contact you. Your physician or   their representative will relay the results to you with their   recommendations at their soonest availability.  Thank you,  RESTRICTIONS:  During your procedure today, you received medications for sedation.  These   medications may affect your judgment, balance and coordination.  Therefore,   for 24 hours, you have the following restrictions:   - DO NOT drive a car, operate machinery, make legal/financial decisions,   sign important papers or drink alcohol.    ACTIVITY:  Today: no heavy lifting, straining or running due to procedural   sedation/anesthesia.  The following day: return to full activity including work.  DIET:  Eat and drink normally unless instructed otherwise.     TREATMENT FOR COMMON SIDE EFFECTS:  - Mild abdominal pain, nausea, belching, bloating or excessive gas:  rest,   eat lightly and use a heating pad.  - Sore Throat: treat with throat lozenges and/or gargle with warm salt   water.  - Because air was used during the procedure, expelling large amounts of air   from your rectum or belching is normal.  - If a bowel prep was taken, you may not have a bowel movement for 1-3 days.    This is normal.  SYMPTOMS TO WATCH FOR AND REPORT TO YOUR PHYSICIAN:  1. Abdominal pain or bloating, other than gas cramps.  2. Chest pain.  3. Back pain.  4. Signs of infection such as: chills or fever occurring within 24 hours   after the procedure.  5. Rectal bleeding, which would show as bright red, maroon, or black stools.   (A tablespoon of blood from the rectum is not serious, especially if    hemorrhoids are present.)  6. Vomiting.  7. Weakness or dizziness.  GO DIRECTLY TO THE NEAREST EMERGENCY ROOM IF YOU HAVE ANY OF THE FOLLOWING:      Difficulty breathing              Chills and/or fever over 101 F   Persistent vomiting and/or vomiting blood   Severe abdominal pain   Severe chest pain   Black, tarry stools   Bleeding- more than one tablespoon   Any other symptom or condition that you feel may need urgent attention  Your doctor recommends these additional instructions:  If any biopsies were taken, your doctors clinic will contact you in 1 to 2   weeks with any results.  - Discharge patient to home after colonoscopy.   - Resume previous diet.   - Continue present medications.   - Await pathology results.   - Use Prilosec (omeprazole) 20 mg PO daily.   - Proceed with colonoscopy.  For questions, problems or results please call your physician Yane Yancey MD at Work:  [phone]  If you have any questions about the above instructions, call the GI   department at [phone] or call the endoscopy unit at [phone]   from 7am until 3 pm.  [facility] [place] [facility], EMERGENCY ROOM PHONE NUMBER:   [phone]  IF A COMPLICATION OR EMERGENCY SITUATION ARISES AND YOU ARE UNABLE TO REACH   YOUR PHYSICIAN - GO DIRECTLY TO THE EMERGENCY ROOM.  I have read or have had read to me these discharge instructions for my   procedure and have received a written copy.  I understand these   instructions and will follow-up with my physician if I have any questions.     __________________________________       _____________________________________  Nurse Signature                                          Patient/Designated   Responsible Party Signature  MD Yane Gonzalez MD  1/9/2024 11:17:52 AM  This report has been verified and signed electronically.  Dear patient,  As a result of recent federal legislation (The Federal Cures Act), you may   receive lab or pathology results from  your procedure in your [facility]   account before your physician is able to contact you. Your physician or   their representative will relay the results to you with their   recommendations at their soonest availability.  Thank you,  PROVATION

## 2024-01-09 NOTE — PLAN OF CARE
"PRAMOD Holman at bedside to recheck patient's eye. Patient stated "it is starting to feel a little better." "My eye still burning and slightly red." Patient instructed by CRNA to do warm compresses, wetting eye drops, and do not rub eye. CRNA provided patient with lubricating eye drops for ride home. Patient instructed to call if her eye is not better in 1-2 day to get an eye drop prescription from doctor.    "

## 2024-01-09 NOTE — ANESTHESIA POSTPROCEDURE EVALUATION
Anesthesia Post Evaluation    Patient: Josiah Perkins    Procedure(s) Performed: Procedure(s) (LRB):  EGD (ESOPHAGOGASTRODUODENOSCOPY) (N/A)  COLONOSCOPY (N/A)    Final Anesthesia Type: MAC      Patient location during evaluation: GI PACU  Patient participation: Yes- Able to Participate  Level of consciousness: awake  Post-procedure vital signs: reviewed and stable  Pain management: adequate  Airway patency: patent    PONV status at discharge: No PONV  Anesthetic complications: no      Cardiovascular status: blood pressure returned to baseline and hemodynamically stable  Respiratory status: unassisted and spontaneous ventilation  Hydration status: euvolemic  Follow-up not needed.              Vitals Value Taken Time   /65 01/09/24 1145   Temp 36.2 °C (97.2 °F) 01/09/24 1145   Pulse 78 01/09/24 1145   Resp 17 01/09/24 1145   SpO2 97 % 01/09/24 1145         Event Time   Out of Recovery 11:57:29         Pain/Noris Score: Noris Score: 9 (1/9/2024 11:30 AM)

## 2024-01-09 NOTE — PLAN OF CARE
Dr. Yancey  at bedside discussing findings. VSS. Patient alert. No N/V. No bleeding, no abdominal pain.   Patient complaining of eye burning. Patient reports eyes burning and red this past week and having to use eye drops. Patient report she has a history of dry eyes.   Patient had rubbed her eyes in recovery with blanket. CRNA at bedside. New orders to flush eye with saline and do warm compress. Will continue to monitor patient.

## 2024-01-10 VITALS
BODY MASS INDEX: 25.91 KG/M2 | RESPIRATION RATE: 17 BRPM | TEMPERATURE: 97 F | WEIGHT: 132 LBS | SYSTOLIC BLOOD PRESSURE: 138 MMHG | HEIGHT: 60 IN | OXYGEN SATURATION: 97 % | DIASTOLIC BLOOD PRESSURE: 65 MMHG | HEART RATE: 78 BPM

## 2024-01-15 LAB
FINAL PATHOLOGIC DIAGNOSIS: NORMAL
GROSS: NORMAL
Lab: NORMAL
SUPPLEMENTAL DIAGNOSIS: NORMAL

## 2024-01-15 NOTE — PROGRESS NOTES
The biopsies of your stomach and esophagus do not show signs of infection BUT do show signs of inflammation. I recommended you start taking the acid reducer Prilosec (Omeprazole) 20 mg daily. Please take daily and consistently to determine if you are responding and if your symptoms are resolving. I recommend a follow up in the GI clinic. Please contact us for an appointment.

## 2024-01-16 RX ORDER — ATORVASTATIN CALCIUM 20 MG/1
20 TABLET, FILM COATED ORAL NIGHTLY
Qty: 30 TABLET | Refills: 0 | Status: SHIPPED | OUTPATIENT
Start: 2024-01-16 | End: 2024-02-07 | Stop reason: SDUPTHER

## 2024-02-01 ENCOUNTER — PATIENT MESSAGE (OUTPATIENT)
Dept: GASTROENTEROLOGY | Facility: CLINIC | Age: 74
End: 2024-02-01

## 2024-02-01 ENCOUNTER — OFFICE VISIT (OUTPATIENT)
Dept: GASTROENTEROLOGY | Facility: CLINIC | Age: 74
End: 2024-02-01
Payer: MEDICARE

## 2024-02-01 VITALS — WEIGHT: 132 LBS | BODY MASS INDEX: 25.91 KG/M2 | HEIGHT: 60 IN

## 2024-02-01 DIAGNOSIS — K30 INDIGESTION: ICD-10-CM

## 2024-02-01 DIAGNOSIS — K29.70 GASTRITIS, PRESENCE OF BLEEDING UNSPECIFIED, UNSPECIFIED CHRONICITY, UNSPECIFIED GASTRITIS TYPE: Primary | ICD-10-CM

## 2024-02-01 PROCEDURE — 1159F MED LIST DOCD IN RCRD: CPT | Mod: CPTII,95,, | Performed by: NURSE PRACTITIONER

## 2024-02-01 PROCEDURE — 3008F BODY MASS INDEX DOCD: CPT | Mod: CPTII,95,, | Performed by: NURSE PRACTITIONER

## 2024-02-01 PROCEDURE — 99214 OFFICE O/P EST MOD 30 MIN: CPT | Mod: 95,,, | Performed by: NURSE PRACTITIONER

## 2024-02-01 PROCEDURE — 1101F PT FALLS ASSESS-DOCD LE1/YR: CPT | Mod: CPTII,95,, | Performed by: NURSE PRACTITIONER

## 2024-02-01 PROCEDURE — 1126F AMNT PAIN NOTED NONE PRSNT: CPT | Mod: CPTII,95,, | Performed by: NURSE PRACTITIONER

## 2024-02-01 PROCEDURE — 3288F FALL RISK ASSESSMENT DOCD: CPT | Mod: CPTII,95,, | Performed by: NURSE PRACTITIONER

## 2024-02-01 NOTE — PROGRESS NOTES
Clinic Follow Up:  [facility] Follow Up Note    Reason for Follow Up:  The primary encounter diagnosis was Gastritis, presence of bleeding unspecified, unspecified chronicity, unspecified gastritis type. A diagnosis of Indigestion was also pertinent to this visit.    PCP: Odell Andersen     The patient location is: [place]  The chief complaint leading to consultation is: above    Visit type: audiovisual    Face to Face time with patient: 10 minutes  20 minutes of total time spent on the encounter, which includes face to face time and non-face to face time preparing to see the patient (eg, review of tests), Obtaining and/or reviewing separately obtained history, Documenting clinical information in the electronic or other health record, Independently interpreting results (not separately reported) and communicating results to the patient/family/caregiver, or Care coordination (not separately reported).         Each patient to whom he or she provides medical services by telemedicine is:  (1) informed of the relationship between the physician and patient and the respective role of any other health care provider with respect to management of the patient; and (2) notified that he or she may decline to receive medical services by telemedicine and may withdraw from such care at any time.    Notes:       HPI:  This is a 73 y.o. female here for follow up of the above  Pt states that since her last visit and recent EGD and colonoscopy, she has been feeling overall stable  Colonoscopy was unremarkable  EGD with some inflammation, biopsy negative  Started on PPI once daily.  Has had symptoms improvement   Denies any abdominal pain.  No nausea or vomiting.  No change in bowel pattern.  No melena or hematochezia. No weight loss.      Review of Systems   Constitutional:  Negative for chills, fever, malaise/fatigue and weight loss.   Respiratory:  Negative for cough.    Cardiovascular:  Negative for chest  pain.   Gastrointestinal:         Per HPI   Musculoskeletal:  Negative for myalgias.   Skin:  Negative for itching and rash.   Neurological:  Negative for headaches.   Psychiatric/Behavioral:  The patient is not nervous/anxious.        Medical History:  Past Medical History:   Diagnosis Date    CKD (chronic kidney disease) stage 3, GFR 30-59 ml/min     Diabetes mellitus, type 2     Hyperlipidemia        Surgical History:   Past Surgical History:   Procedure Laterality Date    APPENDECTOMY      BREAST CYST ASPIRATION Right     2019    BREAST CYST EXCISION Right     CATARACT EXTRACTION       SECTION      x 2    COLONOSCOPY N/A 2024    Procedure: COLONOSCOPY;  Surgeon: Yane Yancey MD;  Location: [facility];  Service: Endoscopy;  Laterality: N/A;    ESOPHAGOGASTRODUODENOSCOPY N/A 2024    Procedure: EGD (ESOPHAGOGASTRODUODENOSCOPY);  Surgeon: Yane Yancey MD;  Location: [facility];  Service: Endoscopy;  Laterality: N/A;    HYSTERECTOMY      SINUS SURGERY      TONSILLECTOMY         Family History:   Family History   Problem Relation Age of Onset    Alzheimer's disease Mother     Hyperlipidemia Mother     Thyroid cancer Sister     Heart failure Sister          at 42    Hodgkin's lymphoma Sister     Breast cancer Maternal Aunt     Breast cancer Maternal Aunt     Heart disease Maternal Grandmother     Colon cancer Maternal Grandfather     Heart disease Paternal Grandmother     Diabetes Neg Hx        Social History:   Social History     Tobacco Use    Smoking status: Never    Smokeless tobacco: Never   Substance Use Topics    Alcohol use: Yes     Comment: glass of wine every other day    Drug use: Never       Allergies: Reviewed    Home Medications:  Current Outpatient Medications on File Prior to Visit   Medication Sig Dispense Refill    atorvastatin (LIPITOR) 20 MG tablet Take 1 tablet (20 mg total) by mouth every evening. 30 tablet 0    diphenhydrAMINE (BENADRYL) 25 mg capsule        erythromycin with ethanoL (EMGEL) 2 % gel AAA of face bid.  For rosacea/redness. 60 g 5    JARDIANCE 25 mg tablet TAKE 1 TABLET(25 MG) BY MOUTH EVERY DAY 90 tablet 1    multivitamin capsule Take 1 capsule by mouth once daily.      omeprazole (PRILOSEC) 20 MG capsule Take 1 capsule (20 mg total) by mouth once daily. 90 capsule 3    traZODone (DESYREL) 100 MG tablet TAKE 1 TABLET BY MOUTH EVERY NIGHT AT BEDTIME AS NEEDED FOR INSOMNIA 90 tablet 2    triamcinolone acetonide 0.025% (KENALOG) 0.025 % Oint AAA bid prn eczema near eyebrow. Use for 1-2 weeks then taper. Mild steroid. 15 g 1    vitamin E 400 UNIT capsule Take 1 capsule (400 Units total) by mouth 2 (two) times daily. 120 capsule 5     No current facility-administered medications on file prior to visit.       Physical Exam:  Vital Signs:  Ht 5' (1.524 m)   Wt 59.9 kg (132 lb)   BMI 25.78 kg/m²   Body mass index is 25.78 kg/m².  Physical Exam  Constitutional:       Appearance: She is well-developed.   HENT:      Head: Normocephalic.   Eyes:      General: No scleral icterus.  Pulmonary:      Effort: Pulmonary effort is normal.   Musculoskeletal:         General: Normal range of motion.      Cervical back: Normal range of motion.   Skin:     General: Skin is dry.   Neurological:      Mental Status: She is alert.         Labs: Pertinent labs reviewed.      Assessment:  1. Gastritis, presence of bleeding unspecified, unspecified chronicity, unspecified gastritis type    2. Indigestion        Recommendations:  Stable with improved symptoms  Continue with PPI once daily for full 12 weeks, then can stop  If symptoms return, can resume medication   GERD diet and lifestyle discussed        Return to Clinic:  As needed based on symptoms

## 2024-02-07 ENCOUNTER — OFFICE VISIT (OUTPATIENT)
Dept: HEMATOLOGY/ONCOLOGY | Facility: CLINIC | Age: 74
End: 2024-02-07
Payer: MEDICARE

## 2024-02-07 ENCOUNTER — LAB VISIT (OUTPATIENT)
Dept: LAB | Facility: HOSPITAL | Age: 74
End: 2024-02-07
Attending: INTERNAL MEDICINE
Payer: MEDICARE

## 2024-02-07 VITALS
SYSTOLIC BLOOD PRESSURE: 118 MMHG | RESPIRATION RATE: 18 BRPM | HEIGHT: 60 IN | BODY MASS INDEX: 25.93 KG/M2 | DIASTOLIC BLOOD PRESSURE: 73 MMHG | HEART RATE: 73 BPM | TEMPERATURE: 98 F | OXYGEN SATURATION: 100 % | WEIGHT: 132.06 LBS

## 2024-02-07 DIAGNOSIS — B37.31 VAGINAL CANDIDIASIS: ICD-10-CM

## 2024-02-07 DIAGNOSIS — C91.10 CLL (CHRONIC LYMPHOCYTIC LEUKEMIA): Primary | ICD-10-CM

## 2024-02-07 DIAGNOSIS — E78.5 HYPERLIPIDEMIA, UNSPECIFIED HYPERLIPIDEMIA TYPE: ICD-10-CM

## 2024-02-07 DIAGNOSIS — C91.10 CLL (CHRONIC LYMPHOCYTIC LEUKEMIA): ICD-10-CM

## 2024-02-07 DIAGNOSIS — Z85.828 HISTORY OF SKIN CANCER: ICD-10-CM

## 2024-02-07 DIAGNOSIS — C91.10 CHRONIC LYMPHOCYTIC LEUKEMIA (CLL), CLINICAL STAGE A: ICD-10-CM

## 2024-02-07 LAB
BASOPHILS # BLD AUTO: 0.08 K/UL (ref 0–0.2)
BASOPHILS NFR BLD: 0.7 % (ref 0–1.9)
DIFFERENTIAL METHOD BLD: ABNORMAL
EOSINOPHIL # BLD AUTO: 0.1 K/UL (ref 0–0.5)
EOSINOPHIL NFR BLD: 0.9 % (ref 0–8)
ERYTHROCYTE [DISTWIDTH] IN BLOOD BY AUTOMATED COUNT: 12.8 % (ref 11.5–14.5)
HCT VFR BLD AUTO: 39.9 % (ref 37–48.5)
HGB BLD-MCNC: 13.6 G/DL (ref 12–16)
IMM GRANULOCYTES # BLD AUTO: 0.03 K/UL (ref 0–0.04)
IMM GRANULOCYTES NFR BLD AUTO: 0.3 % (ref 0–0.5)
LYMPHOCYTES # BLD AUTO: 6.2 K/UL (ref 1–4.8)
LYMPHOCYTES NFR BLD: 53.1 % (ref 18–48)
MCH RBC QN AUTO: 32.4 PG (ref 27–31)
MCHC RBC AUTO-ENTMCNC: 34.1 G/DL (ref 32–36)
MCV RBC AUTO: 95 FL (ref 82–98)
MONOCYTES # BLD AUTO: 1.7 K/UL (ref 0.3–1)
MONOCYTES NFR BLD: 14.9 % (ref 4–15)
NEUTROPHILS # BLD AUTO: 3.5 K/UL (ref 1.8–7.7)
NEUTROPHILS NFR BLD: 30.1 % (ref 38–73)
NRBC BLD-RTO: 0 /100 WBC
PLATELET # BLD AUTO: 234 K/UL (ref 150–450)
PLATELET BLD QL SMEAR: ABNORMAL
PMV BLD AUTO: 11 FL (ref 9.2–12.9)
RBC # BLD AUTO: 4.2 M/UL (ref 4–5.4)
WBC # BLD AUTO: 11.71 K/UL (ref 3.9–12.7)

## 2024-02-07 PROCEDURE — 3078F DIAST BP <80 MM HG: CPT | Mod: CPTII,S$GLB,, | Performed by: INTERNAL MEDICINE

## 2024-02-07 PROCEDURE — 3074F SYST BP LT 130 MM HG: CPT | Mod: CPTII,S$GLB,, | Performed by: INTERNAL MEDICINE

## 2024-02-07 PROCEDURE — 99999 PR PBB SHADOW E&M-EST. PATIENT-LVL IV: CPT | Mod: PBBFAC,,, | Performed by: INTERNAL MEDICINE

## 2024-02-07 PROCEDURE — 1101F PT FALLS ASSESS-DOCD LE1/YR: CPT | Mod: CPTII,S$GLB,, | Performed by: INTERNAL MEDICINE

## 2024-02-07 PROCEDURE — 1125F AMNT PAIN NOTED PAIN PRSNT: CPT | Mod: CPTII,S$GLB,, | Performed by: INTERNAL MEDICINE

## 2024-02-07 PROCEDURE — 85025 COMPLETE CBC W/AUTO DIFF WBC: CPT | Performed by: INTERNAL MEDICINE

## 2024-02-07 PROCEDURE — 3008F BODY MASS INDEX DOCD: CPT | Mod: CPTII,S$GLB,, | Performed by: INTERNAL MEDICINE

## 2024-02-07 PROCEDURE — 3288F FALL RISK ASSESSMENT DOCD: CPT | Mod: CPTII,S$GLB,, | Performed by: INTERNAL MEDICINE

## 2024-02-07 PROCEDURE — 36415 COLL VENOUS BLD VENIPUNCTURE: CPT | Performed by: INTERNAL MEDICINE

## 2024-02-07 PROCEDURE — 1159F MED LIST DOCD IN RCRD: CPT | Mod: CPTII,S$GLB,, | Performed by: INTERNAL MEDICINE

## 2024-02-07 PROCEDURE — 99213 OFFICE O/P EST LOW 20 MIN: CPT | Mod: S$GLB,,, | Performed by: INTERNAL MEDICINE

## 2024-02-07 RX ORDER — FLUCONAZOLE 150 MG/1
150 TABLET ORAL DAILY
Qty: 1 TABLET | Refills: 0 | Status: SHIPPED | OUTPATIENT
Start: 2024-02-07 | End: 2024-02-08

## 2024-02-07 RX ORDER — ATORVASTATIN CALCIUM 20 MG/1
20 TABLET, FILM COATED ORAL NIGHTLY
Qty: 90 TABLET | Refills: 1 | Status: SHIPPED | OUTPATIENT
Start: 2024-02-07 | End: 2024-06-19 | Stop reason: DRUGHIGH

## 2024-02-07 NOTE — PROGRESS NOTES
Patient ID: Josiah Perkins   Chief Complaint: Follow-up (CLL)  MRN:  [id number]     Oncologic Diagnosis: CLL  Current Treatment: Surveillance  Subjective   Ms. Perkins presents for follow up and to review her lab results.     She has not had any change in her health since our last visit. She feels well and her symptoms of fatigue have improved.  She has no acute complaints. Her appetite has also improved.  I reviewed her labs in detail which are stable.  There is no indication for treatment at this time.      Review of Systems:  Review of Systems   Constitutional:  Negative for activity change, chills, diaphoresis, fatigue, fever and unexpected weight change.   Eyes:  Negative for visual disturbance.   Respiratory:  Negative for shortness of breath.    Cardiovascular:  Negative for chest pain.   Gastrointestinal:  Positive for constipation (new). Negative for abdominal distention, abdominal pain, blood in stool, diarrhea, nausea and vomiting.        + indigestion and gas   Genitourinary:  Positive for frequency. Negative for difficulty urinating and hematuria.   Musculoskeletal:  Negative for myalgias. Back pain: scaly patch on the left eye.  Skin:  Positive for rash.   Neurological:  Negative for light-headedness and headaches.   Hematological:  Negative for adenopathy. Does not bruise/bleed easily.   Psychiatric/Behavioral:  The patient is not nervous/anxious.      History     Oncology History   Chronic lymphocytic leukemia (CLL), clinical stage A   12/22/2023 Initial Diagnosis    Chronic lymphocytic leukemia (CLL), clinical stage A     12/22/2023 Cancer Staged    Staging form: Chronic Lymphocytic Leukemia / Small Lymphocytic Lymphoma, AJCC 8th Edition  - Clinical stage from 12/22/2023: Modified Stover Stage 0 (Modified Stover risk: Low, Binet: Stage A, Lymphocytosis: Present, Adenopathy: Absent, Organomegaly: Absent, Anemia: Absent, Thrombocytopenia: Absent)         Past Medical History:   Diagnosis Date    CKD  (chronic kidney disease) stage 3, GFR 30-59 ml/min     Diabetes mellitus, type 2     Hyperlipidemia        Past Surgical History:   Procedure Laterality Date    APPENDECTOMY      BREAST CYST ASPIRATION Right     2019    BREAST CYST EXCISION Right     CATARACT EXTRACTION       SECTION      x 2    COLONOSCOPY N/A 2024    Procedure: COLONOSCOPY;  Surgeon: Yane Yancey MD;  Location: [facility];  Service: Endoscopy;  Laterality: N/A;    ESOPHAGOGASTRODUODENOSCOPY N/A 2024    Procedure: EGD (ESOPHAGOGASTRODUODENOSCOPY);  Surgeon: Yane Yancey MD;  Location: [facility] END[facility];  Service: Endoscopy;  Laterality: N/A;    HYSTERECTOMY      SINUS SURGERY      TONSILLECTOMY         Family History   Problem Relation Age of Onset    Alzheimer's disease Mother     Hyperlipidemia Mother     Thyroid cancer Sister     Heart failure Sister          at 42    Hodgkin's lymphoma Sister     Breast cancer Maternal Aunt     Breast cancer Maternal Aunt     Heart disease Maternal Grandmother     Colon cancer Maternal Grandfather     Heart disease Paternal Grandmother     Diabetes Neg Hx        Review of patient's allergies indicates:   Allergen Reactions    Codeine Nausea And Vomiting    Pcn [penicillins] Rash    Sulfa (sulfonamide antibiotics) Rash       Social History     Tobacco Use    Smoking status: Never    Smokeless tobacco: Never   Substance Use Topics    Alcohol use: Yes     Comment: glass of wine every other day    Drug use: Never       Physical Exam   ECOG:   ECOG SCORE              Vitals:  /73   Pulse 73   Temp 97.7 °F (36.5 °C)   Resp 18   Ht 5' (1.524 m)   Wt 59.9 kg (132 lb 0.9 oz)   SpO2 100%   BMI 25.79 kg/m²     Physical Exam:  Physical Exam  Constitutional:       General: She is not in acute distress.     Appearance: Normal appearance. She is not ill-appearing.   HENT:      Head: Normocephalic and atraumatic.   Eyes:      Extraocular Movements: Extraocular movements intact.       Conjunctiva/sclera: Conjunctivae normal.   Cardiovascular:      Rate and Rhythm: Normal rate and regular rhythm.      Heart sounds: No murmur heard.  Pulmonary:      Effort: Pulmonary effort is normal. No respiratory distress.      Breath sounds: Normal breath sounds. No wheezing, rhonchi or rales.   Abdominal:      General: Bowel sounds are normal. There is no distension.      Palpations: Abdomen is soft. There is no hepatomegaly, splenomegaly or mass.      Tenderness: There is no abdominal tenderness. There is no guarding.   Musculoskeletal:         General: Normal range of motion.      Cervical back: Neck supple. No rigidity or tenderness.      Right lower leg: No edema.      Left lower leg: No edema.   Lymphadenopathy:      Head:      Right side of head: No occipital adenopathy.      Left side of head: No occipital adenopathy.      Cervical: No cervical adenopathy.   Skin:     Findings: No rash.   Neurological:      General: No focal deficit present.      Mental Status: She is alert and oriented to person, place, and time.   Psychiatric:         Mood and Affect: Mood normal.         Behavior: Behavior normal.         Thought Content: Thought content normal.        Labs   Labs:  Lab Visit on 02/07/2024   Component Date Value Ref Range Status    WBC 02/07/2024 11.71  3.90 - 12.70 K/uL Final    RBC 02/07/2024 4.20  4.00 - 5.40 M/uL Final    Hemoglobin 02/07/2024 13.6  12.0 - 16.0 g/dL Final    Hematocrit 02/07/2024 39.9  37.0 - 48.5 % Final    MCV 02/07/2024 95  82 - 98 fL Final    MCH 02/07/2024 32.4 (H)  27.0 - 31.0 pg Final    MCHC 02/07/2024 34.1  32.0 - 36.0 g/dL Final    RDW 02/07/2024 12.8  11.5 - 14.5 % Final    Platelets 02/07/2024 234  150 - 450 K/uL Final    MPV 02/07/2024 11.0  9.2 - 12.9 fL Final    Immature Granulocytes 02/07/2024 0.3  0.0 - 0.5 % Final    Gran # (ANC) 02/07/2024 3.5  1.8 - 7.7 K/uL Final    Immature Grans (Abs) 02/07/2024 0.03  0.00 - 0.04 K/uL Final    Comment: Mild elevation in  immature granulocytes is non specific and   can be seen in a variety of conditions including stress response,   acute inflammation, trauma and pregnancy. Correlation with other   laboratory and clinical findings is essential.      Lymph # 02/07/2024 6.2 (H)  1.0 - 4.8 K/uL Final    Mono # 02/07/2024 1.7 (H)  0.3 - 1.0 K/uL Final    Eos # 02/07/2024 0.1  0.0 - 0.5 K/uL Final    Baso # 02/07/2024 0.08  0.00 - 0.20 K/uL Final    nRBC 02/07/2024 0  0 /100 WBC Final    Gran % 02/07/2024 30.1 (L)  38.0 - 73.0 % Final    Lymph % 02/07/2024 53.1 (H)  18.0 - 48.0 % Final    Mono % 02/07/2024 14.9  4.0 - 15.0 % Final    Eosinophil % 02/07/2024 0.9  0.0 - 8.0 % Final    Basophil % 02/07/2024 0.7  0.0 - 1.9 % Final    Platelet Estimate 02/07/2024 Appears normal   Final    Differential Method 02/07/2024 Automated   Final        Imaging   NM PET CT FDG Skull Base to Mid Thigh 11/29/23  Order: [id number]  Status: Final result       Visible to patient: Yes (seen)       Next appt: 01/02/2024 at 10:20 AM in Radiology ([id number])       Dx: Abnormal CBC    0 Result Notes  Details    Reading Physician Reading Date Result Priority   Hernando Spivey MD  [phone] 11/29/2023 STAT     Narrative & Impression  EXAMINATION:  NM PET CT FDG SKULL BASE TO MID THIGH     CLINICAL HISTORY:  B-cell lymphoproliferative disorder; assess for lymphadenopathy; Other specified abnormal findings of blood chemistry     TECHNIQUE:  11.51 mCi of F18-FDG was administered intravenously in the left antecubital fossa.  After an approximately 60 min distribution time, PET/CT images were acquired from the skull base to the mid thigh. Transmission images were acquired to correct for attenuation using a whole body low-dose CT scan without contrast with the arms positioned above the head. Glycemia at the time of injection was 147 mg/dL.     COMPARISON:  None     FINDINGS:  Quality of the study: Adequate.     SUV max of the liver parenchyma is 2.6.  SUV max of  mediastinal blood pool is 2.1.     Neck: No lymphadenopathy.  No mass or abnormal FDG avidity.     Chest: No FDG avid mediastinal, hilar, or axillary lymphadenopathy.  Clear lungs.  No pleural effusion.     Abdomen/pelvis: No ascites or lymphadenopathy or mass.  No abnormal FDG avidity.     Skeletal structures: No abnormal FDG avidity.  No focal lytic or sclerotic lesion.     Physiologic uptake of the tracer is present within the brain, salivary glands, myocardium, GI and  tracts.     Incidental CT findings: N/A     Impression:     No lymphadenopathy or abnormal FDG avidity on this exam.  Normal PET-CT.     All CT scans at this facility are performed  using dose modulation techniques as appropriate to performed exam including the following:  automated exposure control; adjustment of mA and/or kV according to the patients size (this includes techniques or standardized protocols for targeted exams where dose is matched to indication/reason for exam: i.e. extremities or head);  iterative reconstruction technique.         Assessment and Plan   CLL - Clinical Stage A  CLL-IPI Intermediate Risk (trisomy 12 in 50% of  nuclei and a 13q deletion of both chromosomes 13 in 49% of   nuclei. )  CLL FISH 11/17/23 confirmed the diagnosis; will defer bone marrow biopsy at this time  PET-CT showed no LAD   I discussed the diagnosis, indications for treatment, and significant side effects of BTK inhibitors; I provided her with and informational handout on her diagnosis  No indication for treatment at this time (she has mild fatigue that is not function limiting and is imiproving)      Cancer Screening  MMG: Scheduled  PAP Smear: Hx hysterectomy  Colonoscopy 05/24/14: at [facility]; random bx taken for microscopic colitis and negative; colon normal; repeat 10 years - Refer to GI for C-scope and EGD given change in bowel habits as well as new UGI sx    Chronic Medical Conditions  CKD IIIa  DM II  HLD  GERD  IBS      Med  Onc Chart Routing      Follow up with physician 4 months. lillienet request no friday appointments   Follow up with MARKO    Infusion scheduling note    Injection scheduling note    Labs CBC, CMP, magnesium, phosphorus and LDH   Scheduling:  Preferred lab:  Lab interval:     Imaging    Pharmacy appointment    Other referrals                 The patient was seen, interviewed and examined. Pertinent lab and radiologic studies were reviewed. Pt instructed to call should they develop concerning signs/symptoms or have further questions.        Portions of the record may have been created with voice recognition software. Occasional wrong-word or sound-a-like substitutions may have occurred due to the inherent limitations of voice recognition software. Read the chart carefully and recognize, using context, where substitutions have occurred.      Jess Verdin MD    Hematology/Oncology

## 2024-05-20 ENCOUNTER — OFFICE VISIT (OUTPATIENT)
Dept: URGENT CARE | Facility: CLINIC | Age: 74
End: 2024-05-20
Payer: MEDICARE

## 2024-05-20 ENCOUNTER — PATIENT MESSAGE (OUTPATIENT)
Dept: HEPATOLOGY | Facility: CLINIC | Age: 74
End: 2024-05-20
Payer: MEDICARE

## 2024-05-20 VITALS
TEMPERATURE: 98 F | DIASTOLIC BLOOD PRESSURE: 74 MMHG | RESPIRATION RATE: 16 BRPM | WEIGHT: 130 LBS | SYSTOLIC BLOOD PRESSURE: 178 MMHG | OXYGEN SATURATION: 98 % | HEIGHT: 60 IN | HEART RATE: 69 BPM | BODY MASS INDEX: 25.52 KG/M2

## 2024-05-20 DIAGNOSIS — R07.9 CHEST PAIN AT REST: Primary | ICD-10-CM

## 2024-05-20 DIAGNOSIS — R07.89 CHEST PAIN RADIATING TO ARM: ICD-10-CM

## 2024-05-20 PROCEDURE — 99214 OFFICE O/P EST MOD 30 MIN: CPT | Mod: S$GLB,,, | Performed by: PHYSICIAN ASSISTANT

## 2024-05-20 PROCEDURE — 93010 ELECTROCARDIOGRAM REPORT: CPT | Mod: S$GLB,,, | Performed by: INTERNAL MEDICINE

## 2024-05-20 PROCEDURE — 99285 EMERGENCY DEPT VISIT HI MDM: CPT | Mod: 25

## 2024-05-20 PROCEDURE — 93005 ELECTROCARDIOGRAM TRACING: CPT | Mod: S$GLB,,, | Performed by: PHYSICIAN ASSISTANT

## 2024-05-20 RX ORDER — ASPIRIN 325 MG
650 TABLET, DELAYED RELEASE (ENTERIC COATED) ORAL
Status: COMPLETED | OUTPATIENT
Start: 2024-05-20 | End: 2024-05-20

## 2024-05-20 RX ORDER — ALUMINUM HYDROXIDE, MAGNESIUM HYDROXIDE, AND SIMETHICONE 1200; 120; 1200 MG/30ML; MG/30ML; MG/30ML
30 SUSPENSION ORAL
Status: COMPLETED | OUTPATIENT
Start: 2024-05-20 | End: 2024-05-20

## 2024-05-20 RX ADMIN — Medication 650 MG: at 06:05

## 2024-05-20 RX ADMIN — ALUMINUM HYDROXIDE, MAGNESIUM HYDROXIDE, AND SIMETHICONE 30 ML: 1200; 120; 1200 SUSPENSION ORAL at 06:05

## 2024-05-20 NOTE — TELEPHONE ENCOUNTER
I did her EGD in 01/2024. I prescribed Omeprazole 20mg daily. She should be on this medication. If not, she needs to start.

## 2024-05-20 NOTE — PROGRESS NOTES
Subjective:      Patient ID: Josiah Perkins is a 74 y.o. female.    Vitals:  height is 5' (1.524 m) and weight is 59 kg (130 lb). Her tympanic temperature is 97.7 °F (36.5 °C). Her blood pressure is 178/74 (abnormal) and her pulse is 69. Her respiration is 16 and oxygen saturation is 98%.     Chief Complaint: Chest Pain    Patient presents with chest pain, left arm pain, and tingling lips that began 5 days ago and has gradually worsened. The pain began in the posterior shoulder and has moved to the anterior shoulder. She had been using a heating pad for the past four days, but it did not help with the pain today. She has been taking OTC Tylenol and an antacid.     Chest Pain   This is a new problem. The current episode started in the past 7 days. The onset quality is gradual. The problem occurs constantly. The pain is present in the lateral region. The quality of the pain is described as stabbing and burning. The pain radiates to the left arm. Associated symptoms include dizziness. Pertinent negatives include no abdominal pain, back pain, claudication, cough, diaphoresis, exertional chest pressure, fever, headaches, hemoptysis, irregular heartbeat, leg pain, lower extremity edema, malaise/fatigue, nausea, near-syncope, numbness, orthopnea, palpitations, PND, shortness of breath, sputum production, syncope, vomiting or weakness. She has tried acetaminophen for the symptoms. Risk factors include being elderly.       Constitution: Negative for sweating and fever.   Cardiovascular:  Positive for chest pain. Negative for palpitations and passing out.   Respiratory:  Negative for cough, sputum production, bloody sputum and shortness of breath.    Gastrointestinal:  Negative for abdominal pain, nausea and vomiting.   Musculoskeletal:  Negative for back pain.   Neurological:  Positive for dizziness. Negative for headaches and numbness.      Objective:     Vitals:    05/20/24 1816   BP: (!) 178/74   BP Location: Left arm    Patient Position: Sitting   BP Method: Large (Automatic)   Pulse: 69   Resp: 16   Temp: 97.7 °F (36.5 °C)   TempSrc: Tympanic   SpO2: 98%   Weight: 59 kg (130 lb)   Height: 5' (1.524 m)       Physical Exam   Constitutional: She is oriented to person, place, and time. She appears well-developed. She is cooperative.  Non-toxic appearance. She does not appear ill. No distress.   HENT:   Head: Normocephalic and atraumatic.   Ears:   Right Ear: Hearing, tympanic membrane, external ear and ear canal normal.   Left Ear: Hearing, tympanic membrane, external ear and ear canal normal.   Nose: Nose normal. No mucosal edema, rhinorrhea or nasal deformity. No epistaxis. Right sinus exhibits no maxillary sinus tenderness and no frontal sinus tenderness. Left sinus exhibits no maxillary sinus tenderness and no frontal sinus tenderness.   Mouth/Throat: Uvula is midline, oropharynx is clear and moist and mucous membranes are normal. No trismus in the jaw. Normal dentition. No uvula swelling. No posterior oropharyngeal erythema.   Eyes: Conjunctivae and lids are normal. Right eye exhibits no discharge. Left eye exhibits no discharge. No scleral icterus.   Neck: Trachea normal and phonation normal. Neck supple.   Cardiovascular: Normal rate, regular rhythm, normal heart sounds and normal pulses.   Pulmonary/Chest: Effort normal and breath sounds normal. No respiratory distress. She exhibits tenderness.       Abdominal: Normal appearance and bowel sounds are normal. She exhibits no distension and no mass. Soft. There is no abdominal tenderness.   Musculoskeletal: Normal range of motion.         General: No deformity. Normal range of motion.      Right lower leg: No edema.      Left lower leg: No edema.   Neurological: She is alert and oriented to person, place, and time. She exhibits normal muscle tone. Coordination normal.   Skin: Skin is warm, dry, intact, not diaphoretic and not pale.   Psychiatric: Her speech is normal and  behavior is normal. Judgment and thought content normal. Her mood appears anxious. Her affect is tearful.   Nursing note and vitals reviewed.      Assessment:     1. Chest pain at rest    2. Chest pain radiating to arm      EKst degree AV block, left atrial enlargement, left axis deviation, abnormal ekg.  No comparison.   Plan:       Chest pain at rest  -     aspirin EC tablet 650 mg  -     IN OFFICE EKG 12-LEAD (to [place])  -     Ambulatory referral/consult to Cardiology  -     aluminum-magnesium hydroxide-simethicone 200-200-20 mg/5 mL suspension 30 mL  -     Refer to Emergency Dept.    Chest pain radiating to arm  -     Ambulatory referral/consult to Cardiology  -     Refer to Emergency Dept.          Medical Decision Making:   Initial Assessment:   HIGH BP   TEARFUL   NERVOUS  SHARP STABBING CP WITH SOB   NO KNOWN INJURY OR STRENUOUS ACTIVITY   Differential Diagnosis:   GAS PAIN VS CAD VS MI   Clinical Tests:   Medical Tests: Ordered and Reviewed  Urgent Care Management:  RECOMMEND WORKUP IN ED WITH TROPONIN  STATES THAT SHE WILL HAVE SON IN LAW ACCOMPANY HER  BY PRIVATE VEHICLE  GIVEN ASA AND MALOX WITH NO RELIEF   ABNORMAL EKG      - Educated patient regarding medications for symptomatic relief (outlined below).  - Strict ED precautions given for any emergent symptoms.      I have discussed the diagnosis, treatment plan and recommendations for follow-up with primary care, and patient/guardian verbalized understanding and is agreeable to the plan.   AVS printed and given to patient/guardian upon discharge with information regarding this visit. All questions were addressed prior to discharge.      Additional MDM:   Heart Score:    History:          Moderately suspicious.  Age:               >65 years  Risk factors: 1-2 risk factors        NIH Stroke Scale:   Interval = baseline (upon arrival/admit)  Level of consciousness = 0 - alert  LOC questions = 0 - answers both correctly  LOC commands = 0 - performs both  correctly  Best gaze = 0 - normal  Visual = 0 - no visual loss  Facial palsy = 0 - normal  Motor left arm =  0 - no drift  Motor right arm =  0 - no drift  Motor left leg = 0 - no drift  Limb ataxia = 0 - absent  Sensory = 0 - normal  Best language = 0 - no aphasia  Dysarthria = 0 - normal articulation  Extinction and inattention = 0 - no neglect            Patient Instructions   ER FOR A CARDIAC WORKUP TODAY.       CARDIO AMBULATORY REFERRAL ORDERED:   Someone will call with an appointment this week.  Please answer all unknown calls.  If you are not contacted within 7 days, Please call clinic  for status of appointment. ([phone])    ELEVATED BLOOD PRESSURE READING:    - Your blood pressure was noted to be elevated in clinic today.-- please keep an eye on blood pressures.  - Record blood pressures and follow up with primary care doctor if blood pressures continue to be elevated.  - Here are a few generalized recommended lifestyle modifications proven to lower BPs--DASH diet, exercise, weight loss, reducing stress.  *Of note: above recommendations are generalized conservative lifestyle modifications that include but are not limited to above list.   Please do not attempt any of the above recommendations if they do not pertain to you or should cause overall detriment to your health.   Please call the clinic or your PCP with any questions you may have in regards to BP and lifestyle modifications.        If you have been discharged from the clinic prior to your point of care test results being completed, please make sure to check your [employer] account.  If there is a change in treatment, we will communicate with you through here.  If your test is positive, and medications are ordered, these will be sent to your preferred pharmacy.   If your test is negative, no further steps needed. If you do not hear from us or have questions, please call the clinic.      - You must understand that you have received an  Urgent Care treatment only and that you may be released before all of your medical problems are known or treated.   - You, the patient, will arrange for follow up care as instructed with your primary care provider or recommended specialist.   - If your condition worsens or fails to improve we recommend that you receive another evaluation at the ER immediately or contact your PCP to discuss your concerns, or return here.   - Please do not drive or make any important decisions for 24 hours if you have received any pain medications, sedatives or mood altering drugs during your visit.    Disclaimer: This document was drafted with the use of a voice recognition device and is likely to have sound alike errors.

## 2024-05-20 NOTE — PATIENT INSTRUCTIONS
ER FOR A CARDIAC WORKUP TODAY.       CARDIO AMBULATORY REFERRAL ORDERED:   Someone will call with an appointment this week.  Please answer all unknown calls.  If you are not contacted within 7 days, Please call clinic  for status of appointment. ([phone])    ELEVATED BLOOD PRESSURE READING:    - Your blood pressure was noted to be elevated in clinic today.-- please keep an eye on blood pressures.  - Record blood pressures and follow up with primary care doctor if blood pressures continue to be elevated.  - Here are a few generalized recommended lifestyle modifications proven to lower BPs--DASH diet, exercise, weight loss, reducing stress.  *Of note: above recommendations are generalized conservative lifestyle modifications that include but are not limited to above list.   Please do not attempt any of the above recommendations if they do not pertain to you or should cause overall detriment to your health.   Please call the clinic or your PCP with any questions you may have in regards to BP and lifestyle modifications.        If you have been discharged from the clinic prior to your point of care test results being completed, please make sure to check your [employer] account.  If there is a change in treatment, we will communicate with you through here.  If your test is positive, and medications are ordered, these will be sent to your preferred pharmacy.   If your test is negative, no further steps needed. If you do not hear from us or have questions, please call the clinic.      - You must understand that you have received an Urgent Care treatment only and that you may be released before all of your medical problems are known or treated.   - You, the patient, will arrange for follow up care as instructed with your primary care provider or recommended specialist.   - If your condition worsens or fails to improve we recommend that you receive another evaluation at the ER immediately or contact your PCP to  discuss your concerns, or return here.   - Please do not drive or make any important decisions for 24 hours if you have received any pain medications, sedatives or mood altering drugs during your visit.    Disclaimer: This document was drafted with the use of a voice recognition device and is likely to have sound alike errors.

## 2024-05-21 ENCOUNTER — HOSPITAL ENCOUNTER (EMERGENCY)
Facility: HOSPITAL | Age: 74
Discharge: HOME | End: 2024-05-21
Attending: EMERGENCY MEDICINE
Payer: MEDICARE

## 2024-05-21 VITALS
DIASTOLIC BLOOD PRESSURE: 77 MMHG | HEART RATE: 81 BPM | HEIGHT: 60 IN | TEMPERATURE: 98 F | BODY MASS INDEX: 25.76 KG/M2 | OXYGEN SATURATION: 99 % | WEIGHT: 131.19 LBS | SYSTOLIC BLOOD PRESSURE: 159 MMHG | RESPIRATION RATE: 20 BRPM

## 2024-05-21 DIAGNOSIS — Z00.00 ROUTINE ADULT HEALTH MAINTENANCE: ICD-10-CM

## 2024-05-21 DIAGNOSIS — R07.9 CHEST PAIN: Primary | ICD-10-CM

## 2024-05-21 DIAGNOSIS — Z76.89 ENCOUNTER TO ESTABLISH CARE: Primary | ICD-10-CM

## 2024-05-21 LAB
ALBUMIN SERPL BCP-MCNC: 4.4 G/DL (ref 3.5–5.2)
ALP SERPL-CCNC: 56 U/L (ref 55–135)
ALT SERPL W/O P-5'-P-CCNC: 18 U/L (ref 10–44)
ANION GAP SERPL CALC-SCNC: 10 MMOL/L (ref 8–16)
AST SERPL-CCNC: 16 U/L (ref 10–40)
BASOPHILS NFR BLD: 0 % (ref 0–1.9)
BILIRUB SERPL-MCNC: 0.3 MG/DL (ref 0.1–1)
BNP SERPL-MCNC: 22 PG/ML (ref 0–99)
BUN SERPL-MCNC: 16 MG/DL (ref 8–23)
CALCIUM SERPL-MCNC: 9.5 MG/DL (ref 8.7–10.5)
CHLORIDE SERPL-SCNC: 106 MMOL/L (ref 95–110)
CO2 SERPL-SCNC: 24 MMOL/L (ref 23–29)
CREAT SERPL-MCNC: 0.9 MG/DL (ref 0.5–1.4)
DIFFERENTIAL METHOD BLD: ABNORMAL
EOSINOPHIL NFR BLD: 1 % (ref 0–8)
ERYTHROCYTE [DISTWIDTH] IN BLOOD BY AUTOMATED COUNT: 12.4 % (ref 11.5–14.5)
EST. GFR  (NO RACE VARIABLE): >60 ML/MIN/1.73 M^2
GLUCOSE SERPL-MCNC: 137 MG/DL (ref 70–110)
HCT VFR BLD AUTO: 41.9 % (ref 37–48.5)
HGB BLD-MCNC: 14.1 G/DL (ref 12–16)
IMM GRANULOCYTES # BLD AUTO: ABNORMAL K/UL (ref 0–0.04)
IMM GRANULOCYTES NFR BLD AUTO: ABNORMAL % (ref 0–0.5)
LIPASE SERPL-CCNC: 69 U/L (ref 4–60)
LYMPHOCYTES NFR BLD: 56 % (ref 18–48)
MCH RBC QN AUTO: 31.6 PG (ref 27–31)
MCHC RBC AUTO-ENTMCNC: 33.7 G/DL (ref 32–36)
MCV RBC AUTO: 94 FL (ref 82–98)
MONOCYTES NFR BLD: 18 % (ref 4–15)
MYELOCYTES NFR BLD MANUAL: 1 %
NEUTROPHILS NFR BLD: 24 % (ref 38–73)
NRBC BLD-RTO: 0 /100 WBC
OHS QRS DURATION: 82 MS
OHS QRS DURATION: 84 MS
OHS QRS DURATION: 88 MS
OHS QTC CALCULATION: 423 MS
OHS QTC CALCULATION: 435 MS
OHS QTC CALCULATION: 447 MS
PATH REV BLD -IMP: NORMAL
PLATELET # BLD AUTO: 212 K/UL (ref 150–450)
PLATELET BLD QL SMEAR: ABNORMAL
PMV BLD AUTO: 10.4 FL (ref 9.2–12.9)
POTASSIUM SERPL-SCNC: 4 MMOL/L (ref 3.5–5.1)
PROT SERPL-MCNC: 7.1 G/DL (ref 6–8.4)
RBC # BLD AUTO: 4.46 M/UL (ref 4–5.4)
SODIUM SERPL-SCNC: 140 MMOL/L (ref 136–145)
TROPONIN I SERPL DL<=0.01 NG/ML-MCNC: <0.006 NG/ML (ref 0–0.03)
TROPONIN I SERPL DL<=0.01 NG/ML-MCNC: <0.006 NG/ML (ref 0–0.03)
WBC # BLD AUTO: 14.77 K/UL (ref 3.9–12.7)

## 2024-05-21 PROCEDURE — 25000003 PHARM REV CODE 250: Performed by: EMERGENCY MEDICINE

## 2024-05-21 PROCEDURE — 85060 BLOOD SMEAR INTERPRETATION: CPT | Mod: ,,, | Performed by: PATHOLOGY

## 2024-05-21 PROCEDURE — 83880 ASSAY OF NATRIURETIC PEPTIDE: CPT | Performed by: NURSE PRACTITIONER

## 2024-05-21 PROCEDURE — 83690 ASSAY OF LIPASE: CPT | Performed by: EMERGENCY MEDICINE

## 2024-05-21 PROCEDURE — 84484 ASSAY OF TROPONIN QUANT: CPT | Performed by: NURSE PRACTITIONER

## 2024-05-21 PROCEDURE — 85027 COMPLETE CBC AUTOMATED: CPT | Performed by: NURSE PRACTITIONER

## 2024-05-21 PROCEDURE — 84484 ASSAY OF TROPONIN QUANT: CPT | Mod: 91 | Performed by: EMERGENCY MEDICINE

## 2024-05-21 PROCEDURE — 80053 COMPREHEN METABOLIC PANEL: CPT | Performed by: NURSE PRACTITIONER

## 2024-05-21 PROCEDURE — 85007 BL SMEAR W/DIFF WBC COUNT: CPT | Performed by: NURSE PRACTITIONER

## 2024-05-21 RX ORDER — LIDOCAINE HYDROCHLORIDE 20 MG/ML
15 SOLUTION OROPHARYNGEAL ONCE
Status: COMPLETED | OUTPATIENT
Start: 2024-05-21 | End: 2024-05-21

## 2024-05-21 RX ORDER — PANTOPRAZOLE SODIUM 40 MG/1
40 TABLET, DELAYED RELEASE ORAL DAILY
Qty: 14 TABLET | Refills: 3 | Status: SHIPPED | OUTPATIENT
Start: 2024-05-21 | End: 2024-06-19

## 2024-05-21 RX ORDER — ASPIRIN 325 MG
325 TABLET ORAL
Status: COMPLETED | OUTPATIENT
Start: 2024-05-21 | End: 2024-05-21

## 2024-05-21 RX ORDER — ALUMINUM HYDROXIDE, MAGNESIUM HYDROXIDE, AND SIMETHICONE 1200; 120; 1200 MG/30ML; MG/30ML; MG/30ML
30 SUSPENSION ORAL ONCE
Status: COMPLETED | OUTPATIENT
Start: 2024-05-21 | End: 2024-05-21

## 2024-05-21 RX ADMIN — LIDOCAINE HYDROCHLORIDE 15 ML: 20 SOLUTION ORAL at 03:05

## 2024-05-21 RX ADMIN — ALUMINUM HYDROXIDE, MAGNESIUM HYDROXIDE, AND SIMETHICONE 30 ML: 1200; 120; 1200 SUSPENSION ORAL at 03:05

## 2024-05-21 RX ADMIN — ASPIRIN 325 MG ORAL TABLET 325 MG: 325 PILL ORAL at 01:05

## 2024-05-21 NOTE — TELEPHONE ENCOUNTER
"When she had initially contacted us, she had mentioned only "antacid and tylenol". She made no mention of Omeprazole. Please have her increase Omeprazole to 20mg twice a day. Remind 30 minutes before dinner and 30 minutes before dinner.   "

## 2024-05-21 NOTE — ED PROVIDER NOTES
SCRIBE #1 NOTE: IShona, am scribing for, and in the presence of, Kt Mai DO. I have scribed the entire note.       History     Chief Complaint   Patient presents with    Chest Pain     Pt sent from  c/o CP radiating to LUE since Wed. +SOB. Pt given ASA & Maalox @ around 1820 w/ minimal relief. EKG initiated     Review of patient's allergies indicates:   Allergen Reactions    Codeine Nausea And Vomiting    Pcn [penicillins] Rash    Sulfa (sulfonamide antibiotics) Rash         History of Present Illness     HPI    2024, 1:04 AM  History obtained from the patient      History of Present Illness: Josiah Perkins is a 74 y.o. female patient with a PMHx of CKD, T2DM, and HLD who presents to the Emergency Department for evaluation of moderate midsternal to left-sided CP that radiates to the back and LUE which onset gradually 5 days PTA. Symptoms were initially intermittent, but became constant this morning. The patient was seen at  prior to coming to the ER today and was given ASA and Maalox with mild relief. The patient denies any cardiac disease. She has not had a recent stress test. No mitigating or exacerbating factors reported. Associated sxs include nausea, dizziness, and paresthesia to the lips but is not experiencing any of these today. Patient denies any fever, chills, vomiting, and all other sxs at this time. No further complaints or concerns at this time.       Arrival mode: Personal vehicle    PCP: Odell Andersen MD        Past Medical History:  Past Medical History:   Diagnosis Date    CKD (chronic kidney disease) stage 3, GFR 30-59 ml/min     Diabetes mellitus, type 2     Hyperlipidemia        Past Surgical History:  Past Surgical History:   Procedure Laterality Date    APPENDECTOMY      BREAST CYST ASPIRATION Right     2019    BREAST CYST EXCISION Right     CATARACT EXTRACTION       SECTION      x 2    COLONOSCOPY N/A 2024    Procedure: COLONOSCOPY;  Surgeon:  Yane Yancey MD;  Location: [facility];  Service: Endoscopy;  Laterality: N/A;    ESOPHAGOGASTRODUODENOSCOPY N/A 2024    Procedure: EGD (ESOPHAGOGASTRODUODENOSCOPY);  Surgeon: Yane Yancey MD;  Location: [facility];  Service: Endoscopy;  Laterality: N/A;    HYSTERECTOMY      SINUS SURGERY      TONSILLECTOMY           Family History:  Family History   Problem Relation Name Age of Onset    Alzheimer's disease Mother      Hyperlipidemia Mother      Thyroid cancer Sister      Heart failure Sister           at 42    Hodgkin's lymphoma Sister      Breast cancer Maternal Aunt      Breast cancer Maternal Aunt      Heart disease Maternal Grandmother      Colon cancer Maternal Grandfather      Heart disease Paternal Grandmother      Diabetes Neg Hx         Social History:  Social History     Tobacco Use    Smoking status: Never    Smokeless tobacco: Never   Substance and Sexual Activity    Alcohol use: Yes     Comment: glass of wine every other day    Drug use: Never    Sexual activity: Not Currently        Review of Systems     Review of Systems   Constitutional:  Negative for chills and fever.   HENT:  Negative for sore throat.    Respiratory:  Negative for shortness of breath.    Cardiovascular:  Positive for chest pain (midsternal, left sided).   Gastrointestinal:  Negative for vomiting.   Genitourinary:  Negative for dysuria.   Musculoskeletal:  Positive for back pain and myalgias (LUE).   Skin:  Negative for rash.   Neurological:  Negative for weakness.   Hematological:  Does not bruise/bleed easily.   All other systems reviewed and are negative.     Physical Exam     Initial Vitals [24]   BP Pulse Resp Temp SpO2   (!) 172/70 64 18 97.6 °F (36.4 °C) 98 %      MAP       --          Physical Exam  Vitals reviewed.   Cardiovascular:      Rate and Rhythm: Normal rate and regular rhythm.      Heart sounds: No murmur heard.  Pulmonary:      Effort: Pulmonary effort is normal.      Breath sounds:  No wheezing.   Musculoskeletal:         General: Normal range of motion.   Skin:     General: Skin is warm and dry.      Findings: No rash.   Neurological:      General: No focal deficit present.      Mental Status: She is alert and oriented to person, place, and time.      Sensory: No sensory deficit.          ED Course   Procedures  ED Vital Signs:  Vitals:    05/20/24 1956 05/21/24 0030 05/21/24 0115 05/21/24 0200   BP: (!) 172/70 (!) 181/78 (!) 183/81 (!) 188/75   Pulse: 64 65 67 64   Resp: 18 16 19 16   Temp: 97.6 °F (36.4 °C)      TempSrc: Oral      SpO2: 98% 99% 98% 97%   Weight: 59.5 kg (131 lb 2.8 oz)      Height: 5' (1.524 m)       05/21/24 0215 05/21/24 0315 05/21/24 0502   BP:  (!) 183/82 (!) 159/77   Pulse: 71 65 81   Resp:  20 20   Temp:      TempSrc:      SpO2: 99% 98% 99%   Weight:      Height:          Abnormal Lab Results:  Labs Reviewed   CBC W/ AUTO DIFFERENTIAL - Abnormal; Notable for the following components:       Result Value    WBC 14.77 (*)     MCH 31.6 (*)     Gran % 24.0 (*)     Lymph % 56.0 (*)     Mono % 18.0 (*)     All other components within normal limits   COMPREHENSIVE METABOLIC PANEL - Abnormal; Notable for the following components:    Glucose 137 (*)     All other components within normal limits   LIPASE - Abnormal; Notable for the following components:    Lipase 69 (*)     All other components within normal limits   TROPONIN I   B-TYPE NATRIURETIC PEPTIDE   TROPONIN I   PATHOLOGIST REVIEW        All Lab Results:  Results for orders placed or performed during the hospital encounter of 05/21/24   CBC auto differential   Result Value Ref Range    WBC 14.77 (H) 3.90 - 12.70 K/uL    RBC 4.46 4.00 - 5.40 M/uL    Hemoglobin 14.1 12.0 - 16.0 g/dL    Hematocrit 41.9 37.0 - 48.5 %    MCV 94 82 - 98 fL    MCH 31.6 (H) 27.0 - 31.0 pg    MCHC 33.7 32.0 - 36.0 g/dL    RDW 12.4 11.5 - 14.5 %    Platelets 212 150 - 450 K/uL    MPV 10.4 9.2 - 12.9 fL    Immature Granulocytes CANCELED 0.0 - 0.5 %     Immature Grans (Abs) CANCELED 0.00 - 0.04 K/uL    nRBC 0 0 /100 WBC    Gran % 24.0 (L) 38.0 - 73.0 %    Lymph % 56.0 (H) 18.0 - 48.0 %    Mono % 18.0 (H) 4.0 - 15.0 %    Eosinophil % 1.0 0.0 - 8.0 %    Basophil % 0.0 0.0 - 1.9 %    Myelocytes 1.0 %    Platelet Estimate Appears normal     Differential Method Manual    Comprehensive metabolic panel   Result Value Ref Range    Sodium 140 136 - 145 mmol/L    Potassium 4.0 3.5 - 5.1 mmol/L    Chloride 106 95 - 110 mmol/L    CO2 24 23 - 29 mmol/L    Glucose 137 (H) 70 - 110 mg/dL    BUN 16 8 - 23 mg/dL    Creatinine 0.9 0.5 - 1.4 mg/dL    Calcium 9.5 8.7 - 10.5 mg/dL    Total Protein 7.1 6.0 - 8.4 g/dL    Albumin 4.4 3.5 - 5.2 g/dL    Total Bilirubin 0.3 0.1 - 1.0 mg/dL    Alkaline Phosphatase 56 55 - 135 U/L    AST 16 10 - 40 U/L    ALT 18 10 - 44 U/L    eGFR >60 >60 mL/min/1.73 m^2    Anion Gap 10 8 - 16 mmol/L   Troponin I #1   Result Value Ref Range    Troponin I <0.006 0.000 - 0.026 ng/mL   BNP   Result Value Ref Range    BNP 22 0 - 99 pg/mL   Lipase   Result Value Ref Range    Lipase 69 (H) 4 - 60 U/L   Troponin I   Result Value Ref Range    Troponin I <0.006 0.000 - 0.026 ng/mL   Pathologist Review   Result Value Ref Range    Pathologist Review Peripheral Smear REVIEWED    EKG 12-lead   Result Value Ref Range    QRS Duration 88 ms    [facility] QTC Calculation 435 ms         Imaging Results:  Imaging Results              X-Ray Chest 1 View (Final result)  Result time 05/20/24 21:20:02   Procedure changed from X-Ray Chest PA And Lateral     Final result by Steven Graff MD (05/20/24 21:20:02)                   Impression:     No acute process    Finalized on: 5/20/2024 9:20 PM By:  Steven Graff MD BARRON PhD  [facility]# [id number]      2024-05-20 21:22:16.992    [facility]               Narrative:    EXAM:    XR CHEST 1 VIEW    CLINICAL HISTORY:    Chest pain    FINDINGS:  Heart size is normal.  The lung fields are clear.  No acute cardiopulmonary infiltrate.                                          The EKG was ordered, reviewed, and independently interpreted by the ED provider.  Interpretation time: 20:00  Rate: 68 BPM  Rhythm: Sinus rhythm with 1st degree A-V block  Interpretation: No STEMI.           The Emergency Provider reviewed the vital signs and test results, which are outlined above.     ED Discussion     5:14 AM: Reassessed pt at this time. Discussed with pt all pertinent ED information and results. Discussed pt dx and plan of tx. Gave pt all f/u and return to the ED instructions. All questions and concerns were addressed at this time. Pt expresses understanding of information and instructions, and is comfortable with plan to discharge. Pt is stable for discharge.    I discussed with patient and/or family/caretaker that evaluation in the ED does not suggest any emergent or life threatening medical conditions requiring immediate intervention beyond what was provided in the ED, and I believe patient is safe for discharge.  Regardless, an unremarkable evaluation in the ED does not preclude the development or presence of a serious of life threatening condition. As such, patient was instructed to return immediately for any worsening or change in current symptoms.      ED Course as of 05/22/24 1414   Tue May 21, 2024   0107 X-Ray Chest 1 View  No acute findings [CD]   0200 CBC auto differential(!)  Leukocytosis noted [CD]   0214 Lipase(!)  Mild elevation [CD]   0214 Comprehensive metabolic panel(!)  Nonspecific findings [CD]   0214 Troponin I #1  Within normal limits [CD]   0214 BNP  Within normal limits [CD]   0509 Troponin I  Within normal limits [CD]      ED Course User Index  [CD] Kt Mai, DO     Medical Decision Making  The patient has had 2- troponin values.  She states that her pain is improved after GI cocktail and is able to tolerate liquids at the time of discharge.  An ambulatory referral to Cardiology has been placed to evaluate for the need for further workup and  treatment.  Patient is instructed to return immediately for any new or worsening symptoms and she verbalized understanding.    Amount and/or Complexity of Data Reviewed  Labs: ordered. Decision-making details documented in ED Course.  Radiology: ordered. Decision-making details documented in ED Course.  ECG/medicine tests: ordered and independent interpretation performed. Decision-making details documented in ED Course.    Risk  OTC drugs.  Prescription drug management.  Risk Details: Differential diagnosis includes but is not limited to:  ACS, heart failure, GERD, peptic ulcer disease, pancreatitis, electrolyte abnormality, pneumonia                ED Medication(s):  Medications   aspirin tablet 325 mg (325 mg Oral Given 5/21/24 0118)   aluminum-magnesium hydroxide-simethicone 200-200-20 mg/5 mL suspension 30 mL (30 mLs Oral Given 5/21/24 0306)     And   LIDOcaine viscous HCl 2% oral solution 15 mL (15 mLs Oral Given 5/21/24 0306)       Discharge Medication List as of 5/21/2024  5:13 AM        START taking these medications    Details   pantoprazole (PROTONIX) 40 MG tablet Take 1 tablet (40 mg total) by mouth once daily., Starting Tue 5/21/2024, Normal              Follow-up Information       Schedule an appointment as soon as possible for a visit  with Odell Andersen MD.    Specialty: Family Medicine  Contact information:  [address] [address] [address] [phone]                                 Scribe Attestation:   Scribe #1: I performed the above scribed service and the documentation accurately describes the services I performed. I attest to the accuracy of the note.     Attending:   Physician Attestation Statement for Scribe #1: I, Kt Mai DO, personally performed the services described in this documentation, as scribed by Shona Murillo, in my presence, and it is both accurate and complete.           Clinical Impression       ICD-10-CM ICD-9-CM   1. Chest pain  R07.9 786.50        Disposition:   Disposition: Discharged  Condition: Stable         Kt Mai,   05/22/24 8050

## 2024-05-21 NOTE — FIRST PROVIDER EVALUATION
Medical screening examination initiated.  I have conducted a focused provider triage encounter, findings are as follows:    Brief history of present illness:   74-year-old female presents ER complaining of chest pain that radiates down left arm for 5 days.  Reports pain is worse today.  Associated symptoms shortness of breath.    Vitals:    05/20/24 1956   BP: (!) 172/70   BP Location: Right arm   Patient Position: Sitting   Pulse: 64   Resp: 18   Temp: 97.6 °F (36.4 °C)   TempSrc: Oral   SpO2: 98%   Weight: 59.5 kg (131 lb 2.8 oz)   Height: 5' (1.524 m)       Pertinent physical exam:    Hypertensive.  No acute distress    Brief workup plan:   cardiac workup    Preliminary workup initiated; this workup will be continued and followed by the physician or advanced practice provider that is assigned to the patient when roomed.

## 2024-05-23 ENCOUNTER — HOSPITAL ENCOUNTER (OUTPATIENT)
Dept: RADIOLOGY | Facility: HOSPITAL | Age: 74
Discharge: HOME | End: 2024-05-23
Attending: INTERNAL MEDICINE
Payer: MEDICARE

## 2024-05-23 ENCOUNTER — HOSPITAL ENCOUNTER (OUTPATIENT)
Dept: CARDIOLOGY | Facility: HOSPITAL | Age: 74
Discharge: HOME | End: 2024-05-23
Attending: INTERNAL MEDICINE
Payer: MEDICARE

## 2024-05-23 ENCOUNTER — OFFICE VISIT (OUTPATIENT)
Dept: DERMATOLOGY | Facility: CLINIC | Age: 74
End: 2024-05-23
Payer: MEDICARE

## 2024-05-23 ENCOUNTER — OFFICE VISIT (OUTPATIENT)
Dept: CARDIOLOGY | Facility: CLINIC | Age: 74
End: 2024-05-23
Payer: MEDICARE

## 2024-05-23 VITALS
WEIGHT: 130.31 LBS | SYSTOLIC BLOOD PRESSURE: 110 MMHG | OXYGEN SATURATION: 97 % | HEART RATE: 84 BPM | DIASTOLIC BLOOD PRESSURE: 62 MMHG | HEIGHT: 60 IN | BODY MASS INDEX: 25.58 KG/M2

## 2024-05-23 DIAGNOSIS — G89.29 CHRONIC LEFT-SIDED THORACIC BACK PAIN: ICD-10-CM

## 2024-05-23 DIAGNOSIS — E11.69 DYSLIPIDEMIA ASSOCIATED WITH TYPE 2 DIABETES MELLITUS: ICD-10-CM

## 2024-05-23 DIAGNOSIS — Z85.828 HISTORY OF SKIN CANCER: ICD-10-CM

## 2024-05-23 DIAGNOSIS — Z76.89 ENCOUNTER TO ESTABLISH CARE: ICD-10-CM

## 2024-05-23 DIAGNOSIS — D18.01 HEMANGIOMA OF SKIN: Primary | ICD-10-CM

## 2024-05-23 DIAGNOSIS — I44.0 FIRST DEGREE AV BLOCK: ICD-10-CM

## 2024-05-23 DIAGNOSIS — L82.1 SEBORRHEIC KERATOSIS: ICD-10-CM

## 2024-05-23 DIAGNOSIS — H01.131 ECZEMATOUS DERMATITIS OF UPPER EYELIDS OF BOTH EYES: ICD-10-CM

## 2024-05-23 DIAGNOSIS — C91.10 CHRONIC LYMPHOCYTIC LEUKEMIA (CLL), CLINICAL STAGE A: ICD-10-CM

## 2024-05-23 DIAGNOSIS — R07.9 CHEST PAIN: ICD-10-CM

## 2024-05-23 DIAGNOSIS — M54.6 CHRONIC LEFT-SIDED THORACIC BACK PAIN: ICD-10-CM

## 2024-05-23 DIAGNOSIS — R94.31 ABNORMAL ECG: Primary | ICD-10-CM

## 2024-05-23 DIAGNOSIS — E78.5 DYSLIPIDEMIA ASSOCIATED WITH TYPE 2 DIABETES MELLITUS: ICD-10-CM

## 2024-05-23 DIAGNOSIS — Z00.00 ROUTINE ADULT HEALTH MAINTENANCE: ICD-10-CM

## 2024-05-23 DIAGNOSIS — H01.134 ECZEMATOUS DERMATITIS OF UPPER EYELIDS OF BOTH EYES: ICD-10-CM

## 2024-05-23 PROCEDURE — 99999 PR PBB SHADOW E&M-EST. PATIENT-LVL III: CPT | Mod: PBBFAC,,, | Performed by: INTERNAL MEDICINE

## 2024-05-23 PROCEDURE — 3288F FALL RISK ASSESSMENT DOCD: CPT | Mod: CPTII,S$GLB,, | Performed by: PHYSICIAN ASSISTANT

## 2024-05-23 PROCEDURE — 72070 X-RAY EXAM THORAC SPINE 2VWS: CPT | Mod: 26,,, | Performed by: RADIOLOGY

## 2024-05-23 PROCEDURE — 99999 PR PBB SHADOW E&M-EST. PATIENT-LVL III: CPT | Mod: PBBFAC,,, | Performed by: PHYSICIAN ASSISTANT

## 2024-05-23 PROCEDURE — 3074F SYST BP LT 130 MM HG: CPT | Mod: CPTII,S$GLB,, | Performed by: INTERNAL MEDICINE

## 2024-05-23 PROCEDURE — 93010 ELECTROCARDIOGRAM REPORT: CPT | Mod: ,,, | Performed by: INTERNAL MEDICINE

## 2024-05-23 PROCEDURE — 1159F MED LIST DOCD IN RCRD: CPT | Mod: CPTII,S$GLB,, | Performed by: PHYSICIAN ASSISTANT

## 2024-05-23 PROCEDURE — 1125F AMNT PAIN NOTED PAIN PRSNT: CPT | Mod: CPTII,S$GLB,, | Performed by: PHYSICIAN ASSISTANT

## 2024-05-23 PROCEDURE — 3288F FALL RISK ASSESSMENT DOCD: CPT | Mod: CPTII,S$GLB,, | Performed by: INTERNAL MEDICINE

## 2024-05-23 PROCEDURE — 3078F DIAST BP <80 MM HG: CPT | Mod: CPTII,S$GLB,, | Performed by: INTERNAL MEDICINE

## 2024-05-23 PROCEDURE — 1101F PT FALLS ASSESS-DOCD LE1/YR: CPT | Mod: CPTII,S$GLB,, | Performed by: PHYSICIAN ASSISTANT

## 2024-05-23 PROCEDURE — 99204 OFFICE O/P NEW MOD 45 MIN: CPT | Mod: S$GLB,,, | Performed by: INTERNAL MEDICINE

## 2024-05-23 PROCEDURE — 3008F BODY MASS INDEX DOCD: CPT | Mod: CPTII,S$GLB,, | Performed by: INTERNAL MEDICINE

## 2024-05-23 PROCEDURE — 1101F PT FALLS ASSESS-DOCD LE1/YR: CPT | Mod: CPTII,S$GLB,, | Performed by: INTERNAL MEDICINE

## 2024-05-23 PROCEDURE — 72070 X-RAY EXAM THORAC SPINE 2VWS: CPT | Mod: TC

## 2024-05-23 PROCEDURE — 99214 OFFICE O/P EST MOD 30 MIN: CPT | Mod: S$GLB,,, | Performed by: PHYSICIAN ASSISTANT

## 2024-05-23 PROCEDURE — 1125F AMNT PAIN NOTED PAIN PRSNT: CPT | Mod: CPTII,S$GLB,, | Performed by: INTERNAL MEDICINE

## 2024-05-23 PROCEDURE — 1160F RVW MEDS BY RX/DR IN RCRD: CPT | Mod: CPTII,S$GLB,, | Performed by: PHYSICIAN ASSISTANT

## 2024-05-23 PROCEDURE — G2211 COMPLEX E/M VISIT ADD ON: HCPCS | Mod: S$GLB,,, | Performed by: PHYSICIAN ASSISTANT

## 2024-05-23 PROCEDURE — 93005 ELECTROCARDIOGRAM TRACING: CPT

## 2024-05-23 RX ORDER — PIMECROLIMUS 10 MG/G
CREAM TOPICAL 2 TIMES DAILY
Qty: 30 G | Refills: 1 | Status: SHIPPED | OUTPATIENT
Start: 2024-05-23

## 2024-05-23 NOTE — PROGRESS NOTES
Subjective:      Patient ID:  Joisah Perkins is a 74 y.o. female who presents for   Chief Complaint   Patient presents with    Skin Check     FBSC. Pt concern of the moles located on the back X 1 years s/s dark in color,dry . Pt states moles scab and fall off. TX none      HPI    Review of Systems    Objective:   Physical Exam   Constitutional: She appears well-developed and well-nourished. No distress.   Neurological: She is alert and oriented to person, place, and time. She is not disoriented.   Psychiatric: She has a normal mood and affect.   Skin:   Areas Examined (abnormalities noted in diagram):   Scalp / Hair Palpated and Inspected  Head / Face Inspection Performed  Neck Inspection Performed  Chest / Axilla Inspection Performed  Abdomen Inspection Performed  Genitals / Buttocks / Groin Inspection Performed  Back Inspection Performed  RUE Inspected  LUE Inspection Performed  RLE Inspected  LLE Inspection Performed  Nails and Digits Inspection Performed                 Diagram Legend     Erythematous scaling macule/papule c/w actinic keratosis       Vascular papule c/w angioma      Pigmented verrucoid papule/plaque c/w seborrheic keratosis      Yellow umbilicated papule c/w sebaceous hyperplasia      Irregularly shaped tan macule c/w lentigo     1-2 mm smooth white papules consistent with Milia      Movable subcutaneous cyst with punctum c/w epidermal inclusion cyst      Subcutaneous movable cyst c/w pilar cyst      Firm pink to brown papule c/w dermatofibroma      Pedunculated fleshy papule(s) c/w skin tag(s)      Evenly pigmented macule c/w junctional nevus     Mildly variegated pigmented, slightly irregular-bordered macule c/w mildly atypical nevus      Flesh colored to evenly pigmented papule c/w intradermal nevus       Pink pearly papule/plaque c/w basal cell carcinoma      Erythematous hyperkeratotic cursted plaque c/w SCC      Surgical scar with no sign of skin cancer recurrence      Open and closed  comedones      Inflammatory papules and pustules      Verrucoid papule consistent consistent with wart     Erythematous eczematous patches and plaques     Dystrophic onycholytic nail with subungual debris c/w onychomycosis     Umbilicated papule    Erythematous-base heme-crusted tan verrucoid plaque consistent with inflamed seborrheic keratosis     Erythematous Silvery Scaling Plaque c/w Psoriasis     See annotation      Assessment / Plan:        Hemangioma of skin  Reassurance given.  Lesions are benign.    History of skin cancer  -     Ambulatory referral/consult to Dermatology    Seborrheic keratosis  Reassurance given.  Lesions are benign.    Eczematous dermatitis of upper eyelids of both eyes  -     pimecrolimus (ELIDEL) 1 % cream; Apply topically 2 (two) times daily. PRN eyelid rash flares. Non-steroid.  Dispense: 30 g; Refill: 1  Mild today, but reports frequent flares. Advised to d/c TAC 0.025% daily. Encouraged to reserve prn flares. Trial of TCI to minimize risk of TCS side effects given chronicity and frequency of flares. Encouraged strict sensitive skin care regimen and to avoid nail cosmetics. Encouraged vaseline as barrier cream.            Follow up in about 4 months (around 9/23/2024) for eczema.

## 2024-05-23 NOTE — PROGRESS NOTES
Subjective:   Patient ID:  Josiah Perkins is a 74 y.o. female who presents for evaluation of No chief complaint on file.      HPI  74-year-old female with history as below including diabetes and CKD.    And hyperlipidemia.  Never smoker.    Comes in for evaluation of chest pain that started a few weeks ago.  Her pain is described to the left side of the chest and radiates to the back.    She states it feels better sometimes when she lifts her arms up.  She states that her pain however started to move to the midsternal part of her chest and she was more concerned and came in for a evaluation.    Her symptoms appear however to be exertional.    No dyspnea on exertion.    Her pain is not reproducible on exam.  Past Medical History:   Diagnosis Date    CKD (chronic kidney disease) stage 3, GFR 30-59 ml/min     Diabetes mellitus, type 2     Hyperlipidemia        Past Surgical History:   Procedure Laterality Date    APPENDECTOMY      BREAST CYST ASPIRATION Right     2019    BREAST CYST EXCISION Right     CATARACT EXTRACTION       SECTION      x 2    COLONOSCOPY N/A 2024    Procedure: COLONOSCOPY;  Surgeon: Yane Yancey MD;  Location: [facility];  Service: Endoscopy;  Laterality: N/A;    ESOPHAGOGASTRODUODENOSCOPY N/A 2024    Procedure: EGD (ESOPHAGOGASTRODUODENOSCOPY);  Surgeon: Yane Yancey MD;  Location: [facility];  Service: Endoscopy;  Laterality: N/A;    HYSTERECTOMY      SINUS SURGERY      TONSILLECTOMY         Social History     Tobacco Use    Smoking status: Never    Smokeless tobacco: Never   Substance Use Topics    Alcohol use: Yes     Comment: glass of wine every other day    Drug use: Never       Family History   Problem Relation Name Age of Onset    Alzheimer's disease Mother      Hyperlipidemia Mother      Thyroid cancer Sister      Heart failure Sister           at 42    Hodgkin's lymphoma Sister      Breast cancer Maternal Aunt      Breast cancer Maternal Aunt      Heart  disease Maternal Grandmother      Colon cancer Maternal Grandfather      Heart disease Paternal Grandmother      Diabetes Neg Hx         Review of Systems   Cardiovascular:  Positive for chest pain. Negative for dyspnea on exertion, palpitations and syncope.   Genitourinary: Negative.    Neurological: Negative.        Current Outpatient Medications on File Prior to Visit   Medication Sig    atorvastatin (LIPITOR) 20 MG tablet Take 1 tablet (20 mg total) by mouth every evening.    JARDIANCE 25 mg tablet TAKE 1 TABLET(25 MG) BY MOUTH EVERY DAY    multivitamin capsule Take 1 capsule by mouth once daily.    omeprazole (PRILOSEC) 20 MG capsule Take 1 capsule (20 mg total) by mouth once daily.    pimecrolimus (ELIDEL) 1 % cream Apply topically 2 (two) times daily. PRN eyelid rash flares. Non-steroid.    traZODone (DESYREL) 100 MG tablet TAKE 1 TABLET BY MOUTH EVERY NIGHT AT BEDTIME AS NEEDED FOR INSOMNIA    vitamin E 400 UNIT capsule Take 1 capsule (400 Units total) by mouth 2 (two) times daily.    pantoprazole (PROTONIX) 40 MG tablet Take 1 tablet (40 mg total) by mouth once daily. (Patient not taking: Reported on 5/23/2024)    [DISCONTINUED] diphenhydrAMINE (BENADRYL) 25 mg capsule     [DISCONTINUED] erythromycin with ethanoL (EMGEL) 2 % gel AAA of face bid.  For rosacea/redness.    [DISCONTINUED] triamcinolone acetonide 0.025% (KENALOG) 0.025 % Oint AAA bid prn eczema near eyebrow. Use for 1-2 weeks then taper. Mild steroid. (Patient not taking: Reported on 5/23/2024)     No current facility-administered medications on file prior to visit.       Objective:   Objective:  Wt Readings from Last 3 Encounters:   05/23/24 59.1 kg (130 lb 4.7 oz)   05/20/24 59.5 kg (131 lb 2.8 oz)   05/20/24 59 kg (130 lb)     Temp Readings from Last 3 Encounters:   05/20/24 97.6 °F (36.4 °C) (Oral)   05/20/24 97.7 °F (36.5 °C) (Tympanic)   02/07/24 97.7 °F (36.5 °C)     BP Readings from Last 3 Encounters:   05/23/24 110/62   05/21/24 (!)  "159/77   05/20/24 (!) 178/74     Pulse Readings from Last 3 Encounters:   05/23/24 84   05/21/24 81   05/20/24 69       Physical Exam  Vitals reviewed.   Constitutional:       Appearance: She is well-developed.   Neck:      Vascular: No carotid bruit.   Cardiovascular:      Rate and Rhythm: Normal rate and regular rhythm.      Pulses: Intact distal pulses.      Heart sounds: Normal heart sounds. No murmur heard.  Pulmonary:      Breath sounds: Normal breath sounds.   Neurological:      Mental Status: She is oriented to person, place, and time.         Lab Results   Component Value Date    CHOL 168 11/01/2023    CHOL 165 10/31/2022    CHOL 165 09/27/2021     Lab Results   Component Value Date    HDL 61 11/01/2023    HDL 59 10/31/2022    HDL 57 09/27/2021     Lab Results   Component Value Date    LDLCALC 73.2 11/01/2023    LDLCALC 73.0 10/31/2022    LDLCALC 78.0 09/27/2021     Lab Results   Component Value Date    TRIG 169 (H) 11/01/2023    TRIG 165 (H) 10/31/2022    TRIG 150 09/27/2021     Lab Results   Component Value Date    CHOLHDL 36.3 11/01/2023    CHOLHDL 35.8 10/31/2022    CHOLHDL 34.5 09/27/2021       Chemistry        Component Value Date/Time     05/21/2024 0118    K 4.0 05/21/2024 0118     05/21/2024 0118    CO2 24 05/21/2024 0118    BUN 16 05/21/2024 0118    CREATININE 0.9 05/21/2024 0118     (H) 05/21/2024 0118        Component Value Date/Time    CALCIUM 9.5 05/21/2024 0118    ALKPHOS 56 05/21/2024 0118    AST 16 05/21/2024 0118    ALT 18 05/21/2024 0118    BILITOT 0.3 05/21/2024 0118    ESTGFRAFRICA >60.0 03/28/2022 1019    EGFRNONAA >60.0 03/28/2022 1019          Lab Results   Component Value Date    TSH 2.638 11/01/2023     No results found for: "INR", "PROTIME"  Lab Results   Component Value Date    WBC 14.77 (H) 05/21/2024    HGB 14.1 05/21/2024    HCT 41.9 05/21/2024    MCV 94 05/21/2024     05/21/2024     BNP  @LABRCNTIP(BNP,BNPTRIAGEBLO)@  Estimated Creatinine Clearance: " 44.7 mL/min (based on SCr of 0.9 mg/dL).     Imaging:  ======    No results found for this or any previous visit.    No results found for this or any previous visit.    No results found for this or any previous visit.    No results found for this or any previous visit.    No valid procedures specified.    No results found for this or any previous visit.      No results found for this or any previous visit.      No results found for this or any previous visit.      Diagnostic Results:  ECG: Reviewed    The 10-year ASCVD risk score (Aston SANTANA, et al., 2019) is: 17.6%    Values used to calculate the score:      Age: 74 years      Sex: Female      Is Non- : No      Diabetic: Yes      Tobacco smoker: No      Systolic Blood Pressure: 110 mmHg      Is BP treated: No      HDL Cholesterol: 61 mg/dL      Total Cholesterol: 168 mg/dL        Assessment and Plan:   Abnormal ECG  -     Stress Echo Which stress agent will be used? Treadmill Exercise; Color Flow Doppler? No; Future    Chest pain  -     Ambulatory referral/consult to Cardiology    Chronic left-sided thoracic back pain  -     X-Ray Thoracic Spine AP Lateral; Future; Expected date: 05/23/2024    Chronic lymphocytic leukemia (CLL), clinical stage A    Dyslipidemia associated with type 2 diabetes mellitus    First degree AV block        Reviewed all tests and above medical conditions with patient in detail and formulated treatment plan.  Risk factor modification discussed.   Cardiac low salt diet discussed.  Maintaining healthy weight and weight loss goals were discussed in clinic.  Aspirin 81 mg daily.    Continue with statins.    Obtain x-ray of her upper spine.    Atypical chest pain.  Septal infarct pattern on EKG.    First-degree AV block.  Beta-blocker not prescribed.  We will get a stress echo.  Follow up in  6 months

## 2024-05-24 ENCOUNTER — PATIENT OUTREACH (OUTPATIENT)
Dept: EMERGENCY MEDICINE | Facility: HOSPITAL | Age: 74
End: 2024-05-24
Payer: MEDICARE

## 2024-05-24 NOTE — PROGRESS NOTES
Maranda Fall  ED Navigator  Emergency Department    Project: [facility] ED Navigator  Role: Community Health Worker    Date: 05/24/2024  Patient Name: Josiah Perkins  MRN: [id number]  PCP: No primary care provider on file.    Assessment:     Josiah Perkins is a 74 y.o. female who has presented to ED for chest pain. Patient has visited the ED 1 times in the past 3 months. Patient did not contact PCP.     ED Navigator Initial Assessment    ED Navigator Enrollment Documentation  Consent to Services  Does patient consent to completing the assessment?: Yes  Contact  Method of Initial Contact: Phone  Transportation  Does the patient have issues with Transportation?: No  Does the patient have transportation to and from healthcare appointments?: Yes  Insurance Coverage  Do you have coverage/adequate coverage?: Yes  Type/kind of [place]  Is patient able to afford co-pays/deductibles?: Yes  Is patient able to afford HME or supplies?: Yes  Does patient have an established [facility] PCP?: Yes  Able to access?: Yes  Does the patient have a lack of adequate coverage?: No  Specialist Appointment  Did the patient come to the ED to see a specialist?: No  Does the patient have a pending specialist referral?: No  Does the patient have a specialist appointment made?: Yes  Is the patient able to access a timely specialist appointment?: Yes  PCP Follow Up Appointment  Has the patient had an appointment with a primary care provider in the past year?: Yes  Approximate date: 5/20/24  Provider: Zenaida Allen PA-C  Does the patient have a follow up appontment with a PCP?: Yes  Upcoming appointment date: 6/19/24  Provider: Samantha Millan MD  When was the last time you saw your PCP?: 5/20/24  Medications  Is patient able to afford medication?: Yes  Is patient unable to get medication due to lack of transportation?: No  Psychological  Does the patient have psycho-social concerns?: No (Comment: No Hx charted)  Food  Does the patient have concerns  about food?: No  Communication/Education  Does the patient have limited English proficiency/English not primary language?: No  Does patient have low literacy and/or low health literacy?: Yes  Does patient have concerns with care?: No  Does patient have dissatisfaction with care?: No  Other Financial Concerns  Does the patient have immediate financial distress?: No  Does the patient have general financial concerns?: No  Other Social Barriers/Concerns  Does the patient have any additional barriers or concerns?: Other (see comments) (Comment: Chronic Conditions)  Primary Barrier  Barriers identified: Cognitive barrier (health literacy, language and communication, etc.)  Root Cause of ED Utilization: Chronic Conditions  Plan to address Chronic Conditions: Remind patient of upcoming PCP/specialist appointment within 24 to 48 hours before scheduled appt  Next steps: Provided Education, Scheduled Appointment/Referral  Scheduled Appointment Date: 6/19/24  Appointment Reminder Date: 6/17/24  Additional Documentation: Pt was seen in the ED on 5/21/24 for chest pain. I spoke with pt for Post ED visit follow up navigation to assist with scheduling a 7-day Post ED visit follow up appt. Pt states that she had contacted a new pcp to establish care and has an appt scheduled on 6/19/24 w/Dr. Samantha Millan at 8:00 a.m. Pt declined assistance with scheduling an earlier appt. Pt does not have transportation issues and has no additional needs at this time.  Closing Encounter.    Maranda Fall               Social History     Socioeconomic History    Marital status:     Number of children: 2   Occupational History    Occupation: Retired School councellor   Tobacco Use    Smoking status: Never    Smokeless tobacco: Never   Substance and Sexual Activity    Alcohol use: Yes     Comment: glass of wine every other day    Drug use: Never    Sexual activity: Not Currently   Other Topics Concern    Are you pregnant or think you may be?  No    Breast-feeding No     Social Determinants of Health     Financial Resource Strain: Low Risk  (5/24/2024)    Overall Financial Resource Strain (CARDIA)     Difficulty of Paying Living Expenses: Not hard at all   Food Insecurity: No Food Insecurity (5/24/2024)    Hunger Vital Sign     Worried About Running Out of Food in the Last Year: Never true     Ran Out of Food in the Last Year: Never true   Transportation Needs: No Transportation Needs (5/24/2024)    PRAPARE - Transportation     Lack of Transportation (Medical): No     Lack of Transportation (Non-Medical): No   Physical Activity: Insufficiently Active (11/15/2023)    Exercise Vital Sign     Days of Exercise per Week: 2 days     Minutes of Exercise per Session: 30 min   Stress: No Stress Concern Present (11/15/2023)    [place] citizen [place] [employer] - Occupational Stress Questionnaire     Feeling of Stress : Only a little   Housing Stability: Low Risk  (5/24/2024)    Housing Stability Vital Sign     Unable to Pay for Housing in the Last Year: No     Homeless in the Last Year: No       Plan:     Pt was seen in the ED on 5/21/24 for chest pain. I spoke with pt for Post ED visit follow up navigation to assist with scheduling a 7-day Post ED visit follow up appt. Pt states that she had contacted a new pcp to establish care and has an appt scheduled on 6/19/24 w/Dr. Samantha Millan at 8:00 a.m. Pt declined assistance with scheduling an earlier appt. Pt does not have transportation issues and has no additional needs at this time.  Closing Encounter.    Maranda Fall      Appointment made with: No primary care provider on file.

## 2024-05-27 ENCOUNTER — HOSPITAL ENCOUNTER (OUTPATIENT)
Dept: CARDIOLOGY | Facility: HOSPITAL | Age: 74
Discharge: HOME | End: 2024-05-27
Attending: INTERNAL MEDICINE
Payer: MEDICARE

## 2024-05-27 VITALS
HEIGHT: 60 IN | DIASTOLIC BLOOD PRESSURE: 79 MMHG | SYSTOLIC BLOOD PRESSURE: 136 MMHG | WEIGHT: 130 LBS | BODY MASS INDEX: 25.52 KG/M2

## 2024-05-27 DIAGNOSIS — R94.31 ABNORMAL ECG: ICD-10-CM

## 2024-05-27 LAB
AORTIC ROOT ANNULUS: 2.87 CM
ASCENDING AORTA: 3 CM
AV INDEX (PROSTH): 0.98
AV MEAN GRADIENT: 2 MMHG
AV PEAK GRADIENT: 4 MMHG
AV VALVE AREA BY VELOCITY RATIO: 3.05 CM²
AV VALVE AREA: 3.44 CM²
AV VELOCITY RATIO: 0.87
BSA FOR ECHO PROCEDURE: 1.58 M2
CV ECHO LV RWT: 0.6 CM
CV STRESS BASE HR: 75 BPM
DIASTOLIC BLOOD PRESSURE: 73 MMHG
DOP CALC AO PEAK VEL: 0.95 M/S
DOP CALC AO VTI: 18.4 CM
DOP CALC LVOT AREA: 3.5 CM2
DOP CALC LVOT DIAMETER: 2.11 CM
DOP CALC LVOT PEAK VEL: 0.83 M/S
DOP CALC LVOT STROKE VOLUME: 63.26 CM3
DOP CALC RVOT PEAK VEL: 0.6 M/S
DOP CALC RVOT VTI: 12.9 CM
DOP CALCLVOT PEAK VEL VTI: 18.1 CM
E WAVE DECELERATION TIME: 255.31 MSEC
E/A RATIO: 0.92
E/E' RATIO: 11.83 M/S
ECHO LV POSTERIOR WALL: 1.14 CM (ref 0.6–1.1)
FRACTIONAL SHORTENING: 30 % (ref 28–44)
INTERVENTRICULAR SEPTUM: 0.94 CM (ref 0.6–1.1)
IVC DIAMETER: 1.82 CM
IVRT: 85.63 MSEC
LA MAJOR: 3.81 CM
LA MINOR: 4.25 CM
LA WIDTH: 3.3 CM
LEFT ATRIUM SIZE: 2.99 CM
LEFT ATRIUM VOLUME INDEX MOD: 17.2 ML/M2
LEFT ATRIUM VOLUME INDEX: 21.7 ML/M2
LEFT ATRIUM VOLUME MOD: 26.71 CM3
LEFT ATRIUM VOLUME: 33.7 CM3
LEFT INTERNAL DIMENSION IN SYSTOLE: 2.66 CM (ref 2.1–4)
LEFT VENTRICLE DIASTOLIC VOLUME INDEX: 39.61 ML/M2
LEFT VENTRICLE DIASTOLIC VOLUME: 61.39 ML
LEFT VENTRICLE MASS INDEX: 80 G/M2
LEFT VENTRICLE SYSTOLIC VOLUME INDEX: 16.8 ML/M2
LEFT VENTRICLE SYSTOLIC VOLUME: 26.04 ML
LEFT VENTRICULAR INTERNAL DIMENSION IN DIASTOLE: 3.79 CM (ref 3.5–6)
LEFT VENTRICULAR MASS: 123.59 G
LV LATERAL E/E' RATIO: 10.14 M/S
LV SEPTAL E/E' RATIO: 14.2 M/S
LVOT MG: 1.51 MMHG
LVOT MV: 0.58 CM/S
MV PEAK A VEL: 0.77 M/S
MV PEAK E VEL: 0.71 M/S
MV STENOSIS PRESSURE HALF TIME: 74.04 MS
MV VALVE AREA P 1/2 METHOD: 2.97 CM2
OHS CV CPX 1 MINUTE RECOVERY HEART RATE: 87 BPM
OHS CV CPX 85 PERCENT MAX PREDICTED HEART RATE MALE: 125
OHS CV CPX ESTIMATED METS: 9
OHS CV CPX MAX PREDICTED HEART RATE: 147
OHS CV CPX PATIENT IS FEMALE: 1
OHS CV CPX PATIENT IS MALE: 0
OHS CV CPX PEAK DIASTOLIC BLOOD PRESSURE: 81 MMHG
OHS CV CPX PEAK HEAR RATE: 134 BPM
OHS CV CPX PEAK RATE PRESSURE PRODUCT: ABNORMAL
OHS CV CPX PEAK SYSTOLIC BLOOD PRESSURE: 224 MMHG
OHS CV CPX PERCENT MAX PREDICTED HEART RATE ACHIEVED: 95
OHS CV CPX RATE PRESSURE PRODUCT PRESENTING: ABNORMAL
OHS CV RV/LV RATIO: 0.66 CM
OHS QRS DURATION: 84 MS
OHS QTC CALCULATION: 414 MS
PISA TR MAX VEL: 2.57 M/S
PULM VEIN S/D RATIO: 1.87
PV MEAN GRADIENT: 1 MMHG
PV MV: 0.54 M/S
PV PEAK D VEL: 0.31 M/S
PV PEAK GRADIENT: 2 MMHG
PV PEAK S VEL: 0.58 M/S
PV PEAK VELOCITY: 0.76 M/S
RA MAJOR: 4.48 CM
RA PRESSURE ESTIMATED: 3 MMHG
RA WIDTH: 2.9 CM
RIGHT VENTRICULAR END-DIASTOLIC DIMENSION: 2.51 CM
RV TB RVSP: 6 MMHG
RV TISSUE DOPPLER FREE WALL SYSTOLIC VELOCITY 1 (APICAL 4 CHAMBER VIEW): 12.34 CM/S
SINUS: 2.78 CM
STJ: 2.21 CM
STRESS ECHO POST EXERCISE DUR MIN: 7 MINUTES
STRESS ECHO POST EXERCISE DUR SEC: 14 SECONDS
STRESS ST DEPRESSION: 5 MM
SYSTOLIC BLOOD PRESSURE: 136 MMHG
TDI LATERAL: 0.07 M/S
TDI SEPTAL: 0.05 M/S
TDI: 0.06 M/S
TR MAX PG: 26 MMHG
TRICUSPID ANNULAR PLANE SYSTOLIC EXCURSION: 2 CM
TV REST PULMONARY ARTERY PRESSURE: 29 MMHG
Z-SCORE OF LEFT VENTRICULAR DIMENSION IN END DIASTOLE: -1.69
Z-SCORE OF LEFT VENTRICULAR DIMENSION IN END SYSTOLE: -0.36

## 2024-05-27 PROCEDURE — 93351 STRESS TTE COMPLETE: CPT | Mod: 26,,, | Performed by: INTERNAL MEDICINE

## 2024-05-27 PROCEDURE — 93320 DOPPLER ECHO COMPLETE: CPT | Mod: 26,,, | Performed by: INTERNAL MEDICINE

## 2024-05-27 PROCEDURE — 93320 DOPPLER ECHO COMPLETE: CPT

## 2024-05-27 PROCEDURE — 93325 DOPPLER ECHO COLOR FLOW MAPG: CPT | Mod: 26,,, | Performed by: INTERNAL MEDICINE

## 2024-05-28 ENCOUNTER — TELEPHONE (OUTPATIENT)
Dept: CARDIOLOGY | Facility: CLINIC | Age: 74
End: 2024-05-28
Payer: MEDICARE

## 2024-05-28 NOTE — TELEPHONE ENCOUNTER
Contacted patient; Patient received and understood results with no questions or concerns.       Patient scheduled for next Monday.       ----- Message from Mahogany Ross MD sent at 5/28/2024  9:26 AM CDT -----  Please make her an appointment to discuss her abnormal stress test and next step of care   Thanks  ----- Message -----  From: Gatito Gar MD  Sent: 5/27/2024   3:19 PM CDT  To: Mahogany Ross MD

## 2024-05-29 ENCOUNTER — OFFICE VISIT (OUTPATIENT)
Dept: CARDIOLOGY | Facility: CLINIC | Age: 74
End: 2024-05-29
Payer: MEDICARE

## 2024-05-29 ENCOUNTER — LAB VISIT (OUTPATIENT)
Dept: LAB | Facility: HOSPITAL | Age: 74
End: 2024-05-29
Attending: INTERNAL MEDICINE
Payer: MEDICARE

## 2024-05-29 VITALS
DIASTOLIC BLOOD PRESSURE: 70 MMHG | HEART RATE: 80 BPM | OXYGEN SATURATION: 97 % | BODY MASS INDEX: 25.79 KG/M2 | HEIGHT: 60 IN | SYSTOLIC BLOOD PRESSURE: 124 MMHG | WEIGHT: 131.38 LBS

## 2024-05-29 DIAGNOSIS — E11.69 DYSLIPIDEMIA ASSOCIATED WITH TYPE 2 DIABETES MELLITUS: ICD-10-CM

## 2024-05-29 DIAGNOSIS — E78.5 DYSLIPIDEMIA ASSOCIATED WITH TYPE 2 DIABETES MELLITUS: ICD-10-CM

## 2024-05-29 DIAGNOSIS — C91.10 CHRONIC LYMPHOCYTIC LEUKEMIA (CLL), CLINICAL STAGE A: ICD-10-CM

## 2024-05-29 DIAGNOSIS — R94.39 ABNORMAL STRESS TEST: ICD-10-CM

## 2024-05-29 DIAGNOSIS — R07.89 OTHER CHEST PAIN: Primary | ICD-10-CM

## 2024-05-29 DIAGNOSIS — R07.89 OTHER CHEST PAIN: ICD-10-CM

## 2024-05-29 DIAGNOSIS — I44.0 FIRST DEGREE AV BLOCK: ICD-10-CM

## 2024-05-29 LAB
INR PPP: 0.9 (ref 0.8–1.2)
PROTHROMBIN TIME: 10.2 SEC (ref 9–12.5)

## 2024-05-29 PROCEDURE — 3074F SYST BP LT 130 MM HG: CPT | Mod: CPTII,S$GLB,, | Performed by: INTERNAL MEDICINE

## 2024-05-29 PROCEDURE — 99999 PR PBB SHADOW E&M-EST. PATIENT-LVL III: CPT | Mod: PBBFAC,,, | Performed by: INTERNAL MEDICINE

## 2024-05-29 PROCEDURE — 3288F FALL RISK ASSESSMENT DOCD: CPT | Mod: CPTII,S$GLB,, | Performed by: INTERNAL MEDICINE

## 2024-05-29 PROCEDURE — 1159F MED LIST DOCD IN RCRD: CPT | Mod: CPTII,S$GLB,, | Performed by: INTERNAL MEDICINE

## 2024-05-29 PROCEDURE — 36415 COLL VENOUS BLD VENIPUNCTURE: CPT | Performed by: INTERNAL MEDICINE

## 2024-05-29 PROCEDURE — 85610 PROTHROMBIN TIME: CPT | Performed by: INTERNAL MEDICINE

## 2024-05-29 PROCEDURE — 3078F DIAST BP <80 MM HG: CPT | Mod: CPTII,S$GLB,, | Performed by: INTERNAL MEDICINE

## 2024-05-29 PROCEDURE — 99214 OFFICE O/P EST MOD 30 MIN: CPT | Mod: S$GLB,,, | Performed by: INTERNAL MEDICINE

## 2024-05-29 PROCEDURE — 1126F AMNT PAIN NOTED NONE PRSNT: CPT | Mod: CPTII,S$GLB,, | Performed by: INTERNAL MEDICINE

## 2024-05-29 PROCEDURE — 1101F PT FALLS ASSESS-DOCD LE1/YR: CPT | Mod: CPTII,S$GLB,, | Performed by: INTERNAL MEDICINE

## 2024-05-29 PROCEDURE — 3008F BODY MASS INDEX DOCD: CPT | Mod: CPTII,S$GLB,, | Performed by: INTERNAL MEDICINE

## 2024-05-29 RX ORDER — AMLODIPINE BESYLATE 2.5 MG/1
2.5 TABLET ORAL DAILY
Qty: 30 TABLET | Refills: 11 | Status: SHIPPED | OUTPATIENT
Start: 2024-05-29 | End: 2024-06-17

## 2024-05-29 RX ORDER — DILTIAZEM HYDROCHLORIDE 120 MG/1
120 CAPSULE, COATED, EXTENDED RELEASE ORAL DAILY
Qty: 30 CAPSULE | Refills: 11 | Status: SHIPPED | OUTPATIENT
Start: 2024-05-29 | End: 2024-05-29

## 2024-05-29 NOTE — H&P (VIEW-ONLY)
Subjective:   Patient ID:  Josiah Perkins is a 74 y.o. female who presents for evaluation of No chief complaint on file.      HPI  2024  Comes in for follow-up.  She had her stress echo after last visit it showed 5 mm ST-depression along with inferoapical wall hypokinesis with the stress.    She continues to have chest pain.  She states that her chest pain is worse lately.  He states that her back pain has resolved but she continues to have midsternal chest pain calls it significant enough to be worried about..        2024  74-year-old female with history as below including diabetes and CKD.    And hyperlipidemia.  Never smoker.    Comes in for evaluation of chest pain that started a few weeks ago.  Her pain is described to the left side of the chest and radiates to the back.    She states it feels better sometimes when she lifts her arms up.  She states that her pain however started to move to the midsternal part of her chest and she was more concerned and came in for a evaluation.    Her symptoms appear however to be exertional.    No dyspnea on exertion.    Her pain is not reproducible on exam.  Past Medical History:   Diagnosis Date    CKD (chronic kidney disease) stage 3, GFR 30-59 ml/min     Diabetes mellitus, type 2     Hyperlipidemia        Past Surgical History:   Procedure Laterality Date    APPENDECTOMY      BREAST CYST ASPIRATION Right     2019    BREAST CYST EXCISION Right     CATARACT EXTRACTION       SECTION      x 2    COLONOSCOPY N/A 2024    Procedure: COLONOSCOPY;  Surgeon: Yane Yancey MD;  Location: [facility];  Service: Endoscopy;  Laterality: N/A;    ESOPHAGOGASTRODUODENOSCOPY N/A 2024    Procedure: EGD (ESOPHAGOGASTRODUODENOSCOPY);  Surgeon: Yane Yancey MD;  Location: [facility];  Service: Endoscopy;  Laterality: N/A;    HYSTERECTOMY      SINUS SURGERY      TONSILLECTOMY         Social History     Tobacco Use    Smoking status: Never    Smokeless tobacco:  Never   Substance Use Topics    Alcohol use: Yes     Comment: glass of wine every other day    Drug use: Never       Family History   Problem Relation Name Age of Onset    Alzheimer's disease Mother      Hyperlipidemia Mother      Thyroid cancer Sister      Heart failure Sister           at 42    Hodgkin's lymphoma Sister      Breast cancer Maternal Aunt      Breast cancer Maternal Aunt      Heart disease Maternal Grandmother      Colon cancer Maternal Grandfather      Heart disease Paternal Grandmother      Diabetes Neg Hx         Review of Systems   Cardiovascular:  Positive for chest pain. Negative for dyspnea on exertion, palpitations and syncope.   Genitourinary: Negative.    Neurological: Negative.        Current Outpatient Medications on File Prior to Visit   Medication Sig    atorvastatin (LIPITOR) 20 MG tablet Take 1 tablet (20 mg total) by mouth every evening.    JARDIANCE 25 mg tablet TAKE 1 TABLET(25 MG) BY MOUTH EVERY DAY    multivitamin capsule Take 1 capsule by mouth once daily.    omeprazole (PRILOSEC) 20 MG capsule Take 1 capsule (20 mg total) by mouth once daily.    pimecrolimus (ELIDEL) 1 % cream Apply topically 2 (two) times daily. PRN eyelid rash flares. Non-steroid.    traZODone (DESYREL) 100 MG tablet TAKE 1 TABLET BY MOUTH EVERY NIGHT AT BEDTIME AS NEEDED FOR INSOMNIA    vitamin E 400 UNIT capsule Take 1 capsule (400 Units total) by mouth 2 (two) times daily.    pantoprazole (PROTONIX) 40 MG tablet Take 1 tablet (40 mg total) by mouth once daily. (Patient not taking: Reported on 2024)     No current facility-administered medications on file prior to visit.       Objective:   Objective:  Wt Readings from Last 3 Encounters:   24 59.6 kg (131 lb 6.3 oz)   24 59 kg (130 lb)   24 59.1 kg (130 lb 4.7 oz)     Temp Readings from Last 3 Encounters:   24 97.6 °F (36.4 °C) (Oral)   24 97.7 °F (36.5 °C) (Tympanic)   24 97.7 °F (36.5 °C)     BP Readings from  "Last 3 Encounters:   05/29/24 124/70   05/27/24 136/79   05/23/24 110/62     Pulse Readings from Last 3 Encounters:   05/29/24 80   05/23/24 84   05/21/24 81       Physical Exam  Vitals reviewed.   Constitutional:       Appearance: She is well-developed.   Neck:      Vascular: No carotid bruit.   Cardiovascular:      Rate and Rhythm: Normal rate and regular rhythm.      Pulses: Intact distal pulses.      Heart sounds: Normal heart sounds. No murmur heard.  Pulmonary:      Breath sounds: Normal breath sounds.   Neurological:      Mental Status: She is oriented to person, place, and time.         Lab Results   Component Value Date    CHOL 168 11/01/2023    CHOL 165 10/31/2022    CHOL 165 09/27/2021     Lab Results   Component Value Date    HDL 61 11/01/2023    HDL 59 10/31/2022    HDL 57 09/27/2021     Lab Results   Component Value Date    LDLCALC 73.2 11/01/2023    LDLCALC 73.0 10/31/2022    LDLCALC 78.0 09/27/2021     Lab Results   Component Value Date    TRIG 169 (H) 11/01/2023    TRIG 165 (H) 10/31/2022    TRIG 150 09/27/2021     Lab Results   Component Value Date    CHOLHDL 36.3 11/01/2023    CHOLHDL 35.8 10/31/2022    CHOLHDL 34.5 09/27/2021       Chemistry        Component Value Date/Time     05/21/2024 0118    K 4.0 05/21/2024 0118     05/21/2024 0118    CO2 24 05/21/2024 0118    BUN 16 05/21/2024 0118    CREATININE 0.9 05/21/2024 0118     (H) 05/21/2024 0118        Component Value Date/Time    CALCIUM 9.5 05/21/2024 0118    ALKPHOS 56 05/21/2024 0118    AST 16 05/21/2024 0118    ALT 18 05/21/2024 0118    BILITOT 0.3 05/21/2024 0118    ESTGFRAFRICA >60.0 03/28/2022 1019    EGFRNONAA >60.0 03/28/2022 1019          Lab Results   Component Value Date    TSH 2.638 11/01/2023     No results found for: "INR", "PROTIME"  Lab Results   Component Value Date    WBC 14.77 (H) 05/21/2024    HGB 14.1 05/21/2024    HCT 41.9 05/21/2024    MCV 94 05/21/2024     05/21/2024 "     BNP  @LABRCNTIP(BNP,BNPTRIAGEBLO)@  CrCl cannot be calculated (Patient's most recent lab result is older than the maximum 7 days allowed.).     Imaging:  ======    No results found for this or any previous visit.    No results found for this or any previous visit.    No results found for this or any previous visit.    No results found for this or any previous visit.    No valid procedures specified.    No results found for this or any previous visit.      No results found for this or any previous visit.      No results found for this or any previous visit.      Diagnostic Results:  ECG: Reviewed    Summary 5.27.2024  Show Result Comparison     Left Ventricle: The left ventricle is normal in size. Normal wall thickness. There is normal systolic function with a visually estimated ejection fraction of 55 - 60%. Grade I diastolic dysfunction.    Right Ventricle: Normal right ventricular cavity size. Systolic function is normal.    Tricuspid Valve: There is mild regurgitation.    Pulmonary Artery: The estimated pulmonary artery systolic pressure is 29 mmHg.    IVC/SVC: Normal venous pressure at 3 mmHg.    Stress Protocol: The patient exercised for 7 minutes 14 seconds on a Salvador protocol, corresponding to a functional capacity of 9METS, achieving a peak heart rate of 134 bpm, which is 95% of the age predicted maximum heart rate. The patient experienced no angina during the test. Their exercise capacity was normal. The patient reported no symptoms during the stress test. The test was stopped because the end of the protocol was reached.    Baseline ECG: The Baseline ECG reveals sinus rhythm with 1st degree A/V block. The axis is normal. The baseline ECG has non-specific ST-T wave changes.    Stress ECG: There is 5.0 mm of upward-sloping ST segment depression noted during stress. There are no arrhythmias during stress.    There is hypertensive blood pressure response with stress.    ECG Conclusion: The ECG portion of  the study is negative for ischemia.    Post-stress Impression: The apical septal and mid inferoseptal wall is hypokinetic at peak stress consistent with ischemia.           The 10-year ASCVD risk score (Aston SANTANA, et al., 2019) is: 21.7%    Values used to calculate the score:      Age: 74 years      Sex: Female      Is Non- : No      Diabetic: Yes      Tobacco smoker: No      Systolic Blood Pressure: 124 mmHg      Is BP treated: No      HDL Cholesterol: 61 mg/dL      Total Cholesterol: 168 mg/dL        Assessment and Plan:   Other chest pain  -     Protime-INR; Future; Expected date: 05/29/2024  -     amLODIPine (NORVASC) 2.5 MG tablet; Take 1 tablet (2.5 mg total) by mouth once daily.  Dispense: 30 tablet; Refill: 11    Abnormal stress test  -     Protime-INR; Future; Expected date: 05/29/2024  -     amLODIPine (NORVASC) 2.5 MG tablet; Take 1 tablet (2.5 mg total) by mouth once daily.  Dispense: 30 tablet; Refill: 11    First degree AV block    Chronic lymphocytic leukemia (CLL), clinical stage A    Dyslipidemia associated with type 2 diabetes mellitus        Reviewed all tests and above medical conditions with patient in detail and formulated treatment plan.  Risk factor modification discussed.   Cardiac low salt diet discussed.  Maintaining healthy weight and weight loss goals were discussed in clinic.  Aspirin 81 mg daily.    Continue with statins.    X-ray of the thoracic spine was normal..  Septal infarct pattern on EKG.    First-degree AV block.  Beta-blocker not prescribed.  No hypertension but will attempt low-dose amlodipine for possible angina relief  Abnormal stress echo.  Will add low-dose amlodipine.  We will schedule her for heart catheterization.  Consented and agreeable.    Follow up after heart catheterization

## 2024-05-29 NOTE — PROGRESS NOTES
Subjective:   Patient ID:  Josiah Perkins is a 74 y.o. female who presents for evaluation of No chief complaint on file.      HPI  2024  Comes in for follow-up.  She had her stress echo after last visit it showed 5 mm ST-depression along with inferoapical wall hypokinesis with the stress.    She continues to have chest pain.  She states that her chest pain is worse lately.  He states that her back pain has resolved but she continues to have midsternal chest pain calls it significant enough to be worried about..        2024  74-year-old female with history as below including diabetes and CKD.    And hyperlipidemia.  Never smoker.    Comes in for evaluation of chest pain that started a few weeks ago.  Her pain is described to the left side of the chest and radiates to the back.    She states it feels better sometimes when she lifts her arms up.  She states that her pain however started to move to the midsternal part of her chest and she was more concerned and came in for a evaluation.    Her symptoms appear however to be exertional.    No dyspnea on exertion.    Her pain is not reproducible on exam.  Past Medical History:   Diagnosis Date    CKD (chronic kidney disease) stage 3, GFR 30-59 ml/min     Diabetes mellitus, type 2     Hyperlipidemia        Past Surgical History:   Procedure Laterality Date    APPENDECTOMY      BREAST CYST ASPIRATION Right     2019    BREAST CYST EXCISION Right     CATARACT EXTRACTION       SECTION      x 2    COLONOSCOPY N/A 2024    Procedure: COLONOSCOPY;  Surgeon: Yane Yancey MD;  Location: [facility];  Service: Endoscopy;  Laterality: N/A;    ESOPHAGOGASTRODUODENOSCOPY N/A 2024    Procedure: EGD (ESOPHAGOGASTRODUODENOSCOPY);  Surgeon: Yane Yancey MD;  Location: [facility];  Service: Endoscopy;  Laterality: N/A;    HYSTERECTOMY      SINUS SURGERY      TONSILLECTOMY         Social History     Tobacco Use    Smoking status: Never    Smokeless tobacco:  Never   Substance Use Topics    Alcohol use: Yes     Comment: glass of wine every other day    Drug use: Never       Family History   Problem Relation Name Age of Onset    Alzheimer's disease Mother      Hyperlipidemia Mother      Thyroid cancer Sister      Heart failure Sister           at 42    Hodgkin's lymphoma Sister      Breast cancer Maternal Aunt      Breast cancer Maternal Aunt      Heart disease Maternal Grandmother      Colon cancer Maternal Grandfather      Heart disease Paternal Grandmother      Diabetes Neg Hx         Review of Systems   Cardiovascular:  Positive for chest pain. Negative for dyspnea on exertion, palpitations and syncope.   Genitourinary: Negative.    Neurological: Negative.        Current Outpatient Medications on File Prior to Visit   Medication Sig    atorvastatin (LIPITOR) 20 MG tablet Take 1 tablet (20 mg total) by mouth every evening.    JARDIANCE 25 mg tablet TAKE 1 TABLET(25 MG) BY MOUTH EVERY DAY    multivitamin capsule Take 1 capsule by mouth once daily.    omeprazole (PRILOSEC) 20 MG capsule Take 1 capsule (20 mg total) by mouth once daily.    pimecrolimus (ELIDEL) 1 % cream Apply topically 2 (two) times daily. PRN eyelid rash flares. Non-steroid.    traZODone (DESYREL) 100 MG tablet TAKE 1 TABLET BY MOUTH EVERY NIGHT AT BEDTIME AS NEEDED FOR INSOMNIA    vitamin E 400 UNIT capsule Take 1 capsule (400 Units total) by mouth 2 (two) times daily.    pantoprazole (PROTONIX) 40 MG tablet Take 1 tablet (40 mg total) by mouth once daily. (Patient not taking: Reported on 2024)     No current facility-administered medications on file prior to visit.       Objective:   Objective:  Wt Readings from Last 3 Encounters:   24 59.6 kg (131 lb 6.3 oz)   24 59 kg (130 lb)   24 59.1 kg (130 lb 4.7 oz)     Temp Readings from Last 3 Encounters:   24 97.6 °F (36.4 °C) (Oral)   24 97.7 °F (36.5 °C) (Tympanic)   24 97.7 °F (36.5 °C)     BP Readings from  "Last 3 Encounters:   05/29/24 124/70   05/27/24 136/79   05/23/24 110/62     Pulse Readings from Last 3 Encounters:   05/29/24 80   05/23/24 84   05/21/24 81       Physical Exam  Vitals reviewed.   Constitutional:       Appearance: She is well-developed.   Neck:      Vascular: No carotid bruit.   Cardiovascular:      Rate and Rhythm: Normal rate and regular rhythm.      Pulses: Intact distal pulses.      Heart sounds: Normal heart sounds. No murmur heard.  Pulmonary:      Breath sounds: Normal breath sounds.   Neurological:      Mental Status: She is oriented to person, place, and time.         Lab Results   Component Value Date    CHOL 168 11/01/2023    CHOL 165 10/31/2022    CHOL 165 09/27/2021     Lab Results   Component Value Date    HDL 61 11/01/2023    HDL 59 10/31/2022    HDL 57 09/27/2021     Lab Results   Component Value Date    LDLCALC 73.2 11/01/2023    LDLCALC 73.0 10/31/2022    LDLCALC 78.0 09/27/2021     Lab Results   Component Value Date    TRIG 169 (H) 11/01/2023    TRIG 165 (H) 10/31/2022    TRIG 150 09/27/2021     Lab Results   Component Value Date    CHOLHDL 36.3 11/01/2023    CHOLHDL 35.8 10/31/2022    CHOLHDL 34.5 09/27/2021       Chemistry        Component Value Date/Time     05/21/2024 0118    K 4.0 05/21/2024 0118     05/21/2024 0118    CO2 24 05/21/2024 0118    BUN 16 05/21/2024 0118    CREATININE 0.9 05/21/2024 0118     (H) 05/21/2024 0118        Component Value Date/Time    CALCIUM 9.5 05/21/2024 0118    ALKPHOS 56 05/21/2024 0118    AST 16 05/21/2024 0118    ALT 18 05/21/2024 0118    BILITOT 0.3 05/21/2024 0118    ESTGFRAFRICA >60.0 03/28/2022 1019    EGFRNONAA >60.0 03/28/2022 1019          Lab Results   Component Value Date    TSH 2.638 11/01/2023     No results found for: "INR", "PROTIME"  Lab Results   Component Value Date    WBC 14.77 (H) 05/21/2024    HGB 14.1 05/21/2024    HCT 41.9 05/21/2024    MCV 94 05/21/2024     05/21/2024 "     BNP  @LABRCNTIP(BNP,BNPTRIAGEBLO)@  CrCl cannot be calculated (Patient's most recent lab result is older than the maximum 7 days allowed.).     Imaging:  ======    No results found for this or any previous visit.    No results found for this or any previous visit.    No results found for this or any previous visit.    No results found for this or any previous visit.    No valid procedures specified.    No results found for this or any previous visit.      No results found for this or any previous visit.      No results found for this or any previous visit.      Diagnostic Results:  ECG: Reviewed    Summary 5.27.2024  Show Result Comparison     Left Ventricle: The left ventricle is normal in size. Normal wall thickness. There is normal systolic function with a visually estimated ejection fraction of 55 - 60%. Grade I diastolic dysfunction.    Right Ventricle: Normal right ventricular cavity size. Systolic function is normal.    Tricuspid Valve: There is mild regurgitation.    Pulmonary Artery: The estimated pulmonary artery systolic pressure is 29 mmHg.    IVC/SVC: Normal venous pressure at 3 mmHg.    Stress Protocol: The patient exercised for 7 minutes 14 seconds on a Salvador protocol, corresponding to a functional capacity of 9METS, achieving a peak heart rate of 134 bpm, which is 95% of the age predicted maximum heart rate. The patient experienced no angina during the test. Their exercise capacity was normal. The patient reported no symptoms during the stress test. The test was stopped because the end of the protocol was reached.    Baseline ECG: The Baseline ECG reveals sinus rhythm with 1st degree A/V block. The axis is normal. The baseline ECG has non-specific ST-T wave changes.    Stress ECG: There is 5.0 mm of upward-sloping ST segment depression noted during stress. There are no arrhythmias during stress.    There is hypertensive blood pressure response with stress.    ECG Conclusion: The ECG portion of  the study is negative for ischemia.    Post-stress Impression: The apical septal and mid inferoseptal wall is hypokinetic at peak stress consistent with ischemia.           The 10-year ASCVD risk score (Aston SANTANA, et al., 2019) is: 21.7%    Values used to calculate the score:      Age: 74 years      Sex: Female      Is Non- : No      Diabetic: Yes      Tobacco smoker: No      Systolic Blood Pressure: 124 mmHg      Is BP treated: No      HDL Cholesterol: 61 mg/dL      Total Cholesterol: 168 mg/dL        Assessment and Plan:   Other chest pain  -     Protime-INR; Future; Expected date: 05/29/2024  -     amLODIPine (NORVASC) 2.5 MG tablet; Take 1 tablet (2.5 mg total) by mouth once daily.  Dispense: 30 tablet; Refill: 11    Abnormal stress test  -     Protime-INR; Future; Expected date: 05/29/2024  -     amLODIPine (NORVASC) 2.5 MG tablet; Take 1 tablet (2.5 mg total) by mouth once daily.  Dispense: 30 tablet; Refill: 11    First degree AV block    Chronic lymphocytic leukemia (CLL), clinical stage A    Dyslipidemia associated with type 2 diabetes mellitus        Reviewed all tests and above medical conditions with patient in detail and formulated treatment plan.  Risk factor modification discussed.   Cardiac low salt diet discussed.  Maintaining healthy weight and weight loss goals were discussed in clinic.  Aspirin 81 mg daily.    Continue with statins.    X-ray of the thoracic spine was normal..  Septal infarct pattern on EKG.    First-degree AV block.  Beta-blocker not prescribed.  No hypertension but will attempt low-dose amlodipine for possible angina relief  Abnormal stress echo.  Will add low-dose amlodipine.  We will schedule her for heart catheterization.  Consented and agreeable.    Follow up after heart catheterization

## 2024-06-03 ENCOUNTER — TELEPHONE (OUTPATIENT)
Dept: INTERNAL MEDICINE | Facility: CLINIC | Age: 74
End: 2024-06-03

## 2024-06-03 DIAGNOSIS — E11.65 UNCONTROLLED TYPE 2 DIABETES MELLITUS WITH HYPERGLYCEMIA: Primary | ICD-10-CM

## 2024-06-03 NOTE — TELEPHONE ENCOUNTER
----- Message from Priti Mata sent at 6/3/2024 10:17 AM CDT -----  Contact: Josiah  .Patient is calling to speak with the nurse regarding orders  . Reports wanting Hem A1C orders so the patient can do labs once  . Please give patient a call back at   [phone]

## 2024-06-03 NOTE — TELEPHONE ENCOUNTER
Pt would like to have an A1C  added to her labs with  on Thursday. Pt scheduled to establish care on 06/19/24 with

## 2024-06-04 ENCOUNTER — TELEPHONE (OUTPATIENT)
Dept: CARDIOLOGY | Facility: CLINIC | Age: 74
End: 2024-06-04
Payer: MEDICARE

## 2024-06-04 ENCOUNTER — PATIENT MESSAGE (OUTPATIENT)
Dept: CARDIOLOGY | Facility: CLINIC | Age: 74
End: 2024-06-04
Payer: MEDICARE

## 2024-06-04 DIAGNOSIS — R94.39 ABNORMAL STRESS TEST: Primary | ICD-10-CM

## 2024-06-04 RX ORDER — NITROGLYCERIN 0.4 MG/1
0.4 TABLET SUBLINGUAL EVERY 5 MIN PRN
Qty: 100 TABLET | Refills: 0 | Status: SHIPPED | OUTPATIENT
Start: 2024-06-04 | End: 2025-06-04

## 2024-06-04 NOTE — TELEPHONE ENCOUNTER
Contacted patient; Advised patient that if she is having serious chest pains and dizziness that she would have to go to the ER and they'll do the procedure there but it won't be Dr. Ross performing the procedure; Patient stated that she doesn't want anyone other than Dr. Ross performing the procedure so she'll wait until Friday. She stated she just lost a family member and is under a lot of stress currently so if she can't hold out until Wednesday she will go to the ER. Patient had no further questions or concerns.          ----- Message from Raisa Saeed sent at 6/4/2024 12:30 PM CDT -----  Contact: Josiah Rahman called in stating she's still having chest pain and dizziness. She wants to know if heart cath can  be moved up before Friday.  Please call back at .

## 2024-06-05 ENCOUNTER — PATIENT MESSAGE (OUTPATIENT)
Dept: CARDIOLOGY | Facility: CLINIC | Age: 74
End: 2024-06-05
Payer: MEDICARE

## 2024-06-06 ENCOUNTER — LAB VISIT (OUTPATIENT)
Dept: LAB | Facility: HOSPITAL | Age: 74
End: 2024-06-06
Attending: INTERNAL MEDICINE
Payer: MEDICARE

## 2024-06-06 DIAGNOSIS — C91.10 CLL (CHRONIC LYMPHOCYTIC LEUKEMIA): ICD-10-CM

## 2024-06-06 DIAGNOSIS — E11.65 UNCONTROLLED TYPE 2 DIABETES MELLITUS WITH HYPERGLYCEMIA: ICD-10-CM

## 2024-06-06 LAB
ALBUMIN SERPL BCP-MCNC: 4.6 G/DL (ref 3.5–5.2)
ALP SERPL-CCNC: 54 U/L (ref 55–135)
ALT SERPL W/O P-5'-P-CCNC: 18 U/L (ref 10–44)
ANION GAP SERPL CALC-SCNC: 10 MMOL/L (ref 8–16)
AST SERPL-CCNC: 19 U/L (ref 10–40)
BASOPHILS NFR BLD: 0 % (ref 0–1.9)
BILIRUB SERPL-MCNC: 0.4 MG/DL (ref 0.1–1)
BUN SERPL-MCNC: 20 MG/DL (ref 8–23)
BURR CELLS BLD QL SMEAR: ABNORMAL
CALCIUM SERPL-MCNC: 9.7 MG/DL (ref 8.7–10.5)
CHLORIDE SERPL-SCNC: 104 MMOL/L (ref 95–110)
CHOLEST SERPL-MCNC: 185 MG/DL (ref 120–199)
CHOLEST/HDLC SERPL: 3.2 {RATIO} (ref 2–5)
CO2 SERPL-SCNC: 24 MMOL/L (ref 23–29)
CREAT SERPL-MCNC: 1.1 MG/DL (ref 0.5–1.4)
DIFFERENTIAL METHOD BLD: ABNORMAL
EOSINOPHIL NFR BLD: 1 % (ref 0–8)
ERYTHROCYTE [DISTWIDTH] IN BLOOD BY AUTOMATED COUNT: 13 % (ref 11.5–14.5)
EST. GFR  (NO RACE VARIABLE): 53 ML/MIN/1.73 M^2
GIANT PLATELETS BLD QL SMEAR: ABNORMAL
GLUCOSE SERPL-MCNC: 167 MG/DL (ref 70–110)
HCT VFR BLD AUTO: 44.8 % (ref 37–48.5)
HDLC SERPL-MCNC: 58 MG/DL (ref 40–75)
HDLC SERPL: 31.4 % (ref 20–50)
HGB BLD-MCNC: 14.3 G/DL (ref 12–16)
IMM GRANULOCYTES # BLD AUTO: ABNORMAL K/UL (ref 0–0.04)
IMM GRANULOCYTES NFR BLD AUTO: ABNORMAL % (ref 0–0.5)
LDH SERPL L TO P-CCNC: 131 U/L (ref 110–260)
LDLC SERPL CALC-MCNC: 73.8 MG/DL (ref 63–159)
LYMPHOCYTES NFR BLD: 59 % (ref 18–48)
MAGNESIUM SERPL-MCNC: 2.1 MG/DL (ref 1.6–2.6)
MCH RBC QN AUTO: 31.4 PG (ref 27–31)
MCHC RBC AUTO-ENTMCNC: 31.9 G/DL (ref 32–36)
MCV RBC AUTO: 98 FL (ref 82–98)
MONOCYTES NFR BLD: 5 % (ref 4–15)
NEUTROPHILS NFR BLD: 34 % (ref 38–73)
NEUTS BAND NFR BLD MANUAL: 1 %
NONHDLC SERPL-MCNC: 127 MG/DL
NRBC BLD-RTO: 0 /100 WBC
PHOSPHATE SERPL-MCNC: 4 MG/DL (ref 2.7–4.5)
PLATELET # BLD AUTO: 239 K/UL (ref 150–450)
PLATELET BLD QL SMEAR: ABNORMAL
PMV BLD AUTO: 11.6 FL (ref 9.2–12.9)
POIKILOCYTOSIS BLD QL SMEAR: SLIGHT
POTASSIUM SERPL-SCNC: 4.3 MMOL/L (ref 3.5–5.1)
PROT SERPL-MCNC: 7.4 G/DL (ref 6–8.4)
RBC # BLD AUTO: 4.56 M/UL (ref 4–5.4)
SODIUM SERPL-SCNC: 138 MMOL/L (ref 136–145)
TRIGL SERPL-MCNC: 266 MG/DL (ref 30–150)
TSH SERPL DL<=0.005 MIU/L-ACNC: 2.36 UIU/ML (ref 0.4–4)
WBC # BLD AUTO: 14.49 K/UL (ref 3.9–12.7)

## 2024-06-06 PROCEDURE — 36415 COLL VENOUS BLD VENIPUNCTURE: CPT | Mod: PO | Performed by: INTERNAL MEDICINE

## 2024-06-06 PROCEDURE — 80061 LIPID PANEL: CPT | Performed by: FAMILY MEDICINE

## 2024-06-06 PROCEDURE — 80053 COMPREHEN METABOLIC PANEL: CPT | Performed by: INTERNAL MEDICINE

## 2024-06-06 PROCEDURE — 85007 BL SMEAR W/DIFF WBC COUNT: CPT | Performed by: INTERNAL MEDICINE

## 2024-06-06 PROCEDURE — 85027 COMPLETE CBC AUTOMATED: CPT | Performed by: INTERNAL MEDICINE

## 2024-06-06 PROCEDURE — 83615 LACTATE (LD) (LDH) ENZYME: CPT | Performed by: INTERNAL MEDICINE

## 2024-06-06 PROCEDURE — 84100 ASSAY OF PHOSPHORUS: CPT | Performed by: INTERNAL MEDICINE

## 2024-06-06 PROCEDURE — 83735 ASSAY OF MAGNESIUM: CPT | Performed by: INTERNAL MEDICINE

## 2024-06-06 PROCEDURE — 84443 ASSAY THYROID STIM HORMONE: CPT | Performed by: FAMILY MEDICINE

## 2024-06-06 PROCEDURE — 83036 HEMOGLOBIN GLYCOSYLATED A1C: CPT | Performed by: FAMILY MEDICINE

## 2024-06-07 ENCOUNTER — HOSPITAL ENCOUNTER (OUTPATIENT)
Facility: HOSPITAL | Age: 74
Discharge: HOME | End: 2024-06-07
Attending: INTERNAL MEDICINE | Admitting: INTERNAL MEDICINE
Payer: MEDICARE

## 2024-06-07 VITALS
OXYGEN SATURATION: 97 % | DIASTOLIC BLOOD PRESSURE: 61 MMHG | SYSTOLIC BLOOD PRESSURE: 113 MMHG | TEMPERATURE: 97 F | HEART RATE: 72 BPM | RESPIRATION RATE: 22 BRPM

## 2024-06-07 DIAGNOSIS — I44.0 FIRST DEGREE AV BLOCK: ICD-10-CM

## 2024-06-07 DIAGNOSIS — R07.9 CHEST PAIN, MODERATE CORONARY ARTERY RISK: ICD-10-CM

## 2024-06-07 DIAGNOSIS — R94.31 ABNORMAL EKG: ICD-10-CM

## 2024-06-07 DIAGNOSIS — R94.39 ABNORMAL STRESS TEST: ICD-10-CM

## 2024-06-07 LAB
ESTIMATED AVG GLUCOSE: 169 MG/DL (ref 68–131)
HBA1C MFR BLD: 7.5 % (ref 4–5.6)
POCT GLUCOSE: 147 MG/DL (ref 70–110)

## 2024-06-07 PROCEDURE — 25000003 PHARM REV CODE 250: Performed by: INTERNAL MEDICINE

## 2024-06-07 PROCEDURE — 93458 L HRT ARTERY/VENTRICLE ANGIO: CPT | Performed by: INTERNAL MEDICINE

## 2024-06-07 PROCEDURE — C1769 GUIDE WIRE: HCPCS | Performed by: INTERNAL MEDICINE

## 2024-06-07 PROCEDURE — 99152 MOD SED SAME PHYS/QHP 5/>YRS: CPT | Performed by: INTERNAL MEDICINE

## 2024-06-07 PROCEDURE — 93458 L HRT ARTERY/VENTRICLE ANGIO: CPT | Mod: 26,,, | Performed by: INTERNAL MEDICINE

## 2024-06-07 PROCEDURE — 63600175 PHARM REV CODE 636 W HCPCS: Performed by: INTERNAL MEDICINE

## 2024-06-07 PROCEDURE — 99152 MOD SED SAME PHYS/QHP 5/>YRS: CPT | Mod: ,,, | Performed by: INTERNAL MEDICINE

## 2024-06-07 PROCEDURE — C1894 INTRO/SHEATH, NON-LASER: HCPCS | Performed by: INTERNAL MEDICINE

## 2024-06-07 PROCEDURE — 25500020 PHARM REV CODE 255: Performed by: INTERNAL MEDICINE

## 2024-06-07 PROCEDURE — C1887 CATHETER, GUIDING: HCPCS | Performed by: INTERNAL MEDICINE

## 2024-06-07 RX ORDER — SODIUM CHLORIDE 0.9 % (FLUSH) 0.9 %
10 SYRINGE (ML) INJECTION
Status: DISCONTINUED | OUTPATIENT
Start: 2024-06-07 | End: 2024-06-07 | Stop reason: HOSPADM

## 2024-06-07 RX ORDER — NITROGLYCERIN 5 MG/ML
INJECTION, SOLUTION INTRAVENOUS
Status: DISCONTINUED | OUTPATIENT
Start: 2024-06-07 | End: 2024-06-07 | Stop reason: HOSPADM

## 2024-06-07 RX ORDER — SODIUM CHLORIDE 9 MG/ML
INJECTION, SOLUTION INTRAVENOUS CONTINUOUS
Status: ACTIVE | OUTPATIENT
Start: 2024-06-07 | End: 2024-06-07

## 2024-06-07 RX ORDER — ACETAMINOPHEN 325 MG/1
650 TABLET ORAL EVERY 4 HOURS PRN
Status: DISCONTINUED | OUTPATIENT
Start: 2024-06-07 | End: 2024-06-07 | Stop reason: HOSPADM

## 2024-06-07 RX ORDER — LIDOCAINE HYDROCHLORIDE 20 MG/ML
INJECTION, SOLUTION EPIDURAL; INFILTRATION; INTRACAUDAL; PERINEURAL
Status: DISCONTINUED | OUTPATIENT
Start: 2024-06-07 | End: 2024-06-07 | Stop reason: HOSPADM

## 2024-06-07 RX ORDER — FENTANYL CITRATE 50 UG/ML
INJECTION, SOLUTION INTRAMUSCULAR; INTRAVENOUS
Status: DISCONTINUED | OUTPATIENT
Start: 2024-06-07 | End: 2024-06-07 | Stop reason: HOSPADM

## 2024-06-07 RX ORDER — MIDAZOLAM HYDROCHLORIDE 1 MG/ML
INJECTION INTRAMUSCULAR; INTRAVENOUS
Status: DISCONTINUED | OUTPATIENT
Start: 2024-06-07 | End: 2024-06-07 | Stop reason: HOSPADM

## 2024-06-07 RX ORDER — VERAPAMIL HYDROCHLORIDE 2.5 MG/ML
INJECTION, SOLUTION INTRAVENOUS
Status: DISCONTINUED | OUTPATIENT
Start: 2024-06-07 | End: 2024-06-07 | Stop reason: HOSPADM

## 2024-06-07 RX ORDER — DIPHENHYDRAMINE HCL 50 MG
50 CAPSULE ORAL ONCE
Status: COMPLETED | OUTPATIENT
Start: 2024-06-07 | End: 2024-06-07

## 2024-06-07 RX ORDER — HEPARIN SODIUM 1000 [USP'U]/ML
INJECTION, SOLUTION INTRAVENOUS; SUBCUTANEOUS
Status: DISCONTINUED | OUTPATIENT
Start: 2024-06-07 | End: 2024-06-07 | Stop reason: HOSPADM

## 2024-06-07 RX ORDER — ONDANSETRON 8 MG/1
8 TABLET, ORALLY DISINTEGRATING ORAL EVERY 8 HOURS PRN
Status: DISCONTINUED | OUTPATIENT
Start: 2024-06-07 | End: 2024-06-07 | Stop reason: HOSPADM

## 2024-06-07 RX ORDER — NAPROXEN SODIUM 220 MG/1
81 TABLET, FILM COATED ORAL ONCE
Status: COMPLETED | OUTPATIENT
Start: 2024-06-07 | End: 2024-06-07

## 2024-06-07 RX ORDER — ONDANSETRON HYDROCHLORIDE 2 MG/ML
4 INJECTION, SOLUTION INTRAVENOUS ONCE
Status: COMPLETED | OUTPATIENT
Start: 2024-06-07 | End: 2024-06-07

## 2024-06-07 RX ADMIN — ONDANSETRON 4 MG: 2 INJECTION INTRAMUSCULAR; INTRAVENOUS at 12:06

## 2024-06-07 RX ADMIN — DIPHENHYDRAMINE HYDROCHLORIDE 50 MG: 50 CAPSULE ORAL at 12:06

## 2024-06-07 RX ADMIN — ASPIRIN 81 MG CHEWABLE TABLET 81 MG: 81 TABLET CHEWABLE at 12:06

## 2024-06-07 RX ADMIN — SODIUM CHLORIDE: 0.9 INJECTION, SOLUTION INTRAVENOUS at 12:06

## 2024-06-07 NOTE — PLAN OF CARE
1515 TR band off.  Site stable. Ambulated to bathroom and back without assistance. Discharge instructions reviewed and copy given.  Denies c/o pain or weakness. R radial site intact w/o swelling.  IV removed.  Discharged home.  To exit via W/C

## 2024-06-07 NOTE — Clinical Note
45 ml of contrast were injected throughout the case. 0 mL of contrast was the total wasted during the case. 45 mL was the total amount used during the case.

## 2024-06-07 NOTE — Clinical Note
The wire was inserted into the aorta. Referred To Plastics For Closure Text (Leave Blank If You Do Not Want): After obtaining clear surgical margins the patient was sent to plastics for surgical repair.  The patient understands they will receive post-surgical care and follow-up from Dr. Garcia.

## 2024-06-07 NOTE — DISCHARGE SUMMARY
[facility] ([facility])  Cardiology  Discharge Summary      Patient Name: Josiah Perkins  MRN: [id number]  Admission Date: 6/7/2024  Hospital Length of Stay: 0 days  Discharge Date and Time:  06/07/2024 1:31 PM  Attending Physician: Mahogayn Ross MD    Discharging Provider: Mahogany Ross MD  Primary Care Physician: Samantha Millan MD    HPI:   No notes on file    Procedure(s) (LRB):  Angiogram, Coronary, with Left Heart Cath (N/A)     Indwelling Lines/Drains at time of discharge:  Lines/Drains/Airways       None                   Hospital Course:  No notes on file    Goals of Care Treatment Preferences:  Code Status: Full Code      Consults:     Significant Diagnostic Studies: Angiography: Results for orders placed during the hospital encounter of 06/07/24    Cardiac catheterization    Conclusion    The pre-procedure left ventricular end diastolic pressure was 13.    The estimated blood loss was none.    There was non-obstructive coronary artery disease..    The filling pressures on the left were normal.    The procedure log was documented by Documenter: Anne-Marie Mclaughlin RN and verified by Mahogany Ross MD.    Date: 6/7/2024  Time: 1:25 PM      Pending Diagnostic Studies:       None            There are no hospital problems to display for this patient.    Assessment & plan notes cannot be loaded without a specified hospital service.      Discharged Condition: good    Disposition: Home or Self Care    Follow Up:    Patient Instructions:   No discharge procedures on file.  Medications:  Reconciled Home Medications:      Medication List        ASK your doctor about these medications      amLODIPine 2.5 MG tablet  Commonly known as: NORVASC  Take 1 tablet (2.5 mg total) by mouth once daily.     atorvastatin 20 MG tablet  Commonly known as: LIPITOR  Take 1 tablet (20 mg total) by mouth every evening.     JARDIANCE 25 mg tablet  Generic drug: empagliflozin  TAKE 1 TABLET(25 MG) BY MOUTH EVERY DAY      multivitamin capsule  Take 1 capsule by mouth once daily.     nitroGLYCERIN 0.4 MG SL tablet  Commonly known as: NITROSTAT  Place 1 tablet (0.4 mg total) under the tongue every 5 (five) minutes as needed for Chest pain.     omeprazole 20 MG capsule  Commonly known as: PRILOSEC  Take 1 capsule (20 mg total) by mouth once daily.     pantoprazole 40 MG tablet  Commonly known as: PROTONIX  Take 1 tablet (40 mg total) by mouth once daily.     pimecrolimus 1 % cream  Commonly known as: ELIDEL  Apply topically 2 (two) times daily. PRN eyelid rash flares. Non-steroid.     traZODone 100 MG tablet  Commonly known as: DESYREL  TAKE 1 TABLET BY MOUTH EVERY NIGHT AT BEDTIME AS NEEDED FOR INSOMNIA     vitamin E 400 UNIT capsule  Take 1 capsule (400 Units total) by mouth 2 (two) times daily.              Time spent on the discharge of patient: 30 minutes    Mahogany Ross MD  Cardiology  [facility] ([facility])

## 2024-06-11 ENCOUNTER — OFFICE VISIT (OUTPATIENT)
Dept: HEMATOLOGY/ONCOLOGY | Facility: CLINIC | Age: 74
End: 2024-06-11
Payer: MEDICARE

## 2024-06-11 VITALS
HEART RATE: 81 BPM | BODY MASS INDEX: 25.84 KG/M2 | DIASTOLIC BLOOD PRESSURE: 56 MMHG | OXYGEN SATURATION: 97 % | SYSTOLIC BLOOD PRESSURE: 102 MMHG | TEMPERATURE: 98 F | HEIGHT: 60 IN | WEIGHT: 131.63 LBS

## 2024-06-11 DIAGNOSIS — N18.31 STAGE 3A CHRONIC KIDNEY DISEASE: ICD-10-CM

## 2024-06-11 DIAGNOSIS — C91.10 CHRONIC LYMPHOCYTIC LEUKEMIA (CLL), CLINICAL STAGE A: Primary | ICD-10-CM

## 2024-06-11 PROCEDURE — 1159F MED LIST DOCD IN RCRD: CPT | Mod: CPTII,S$GLB,, | Performed by: INTERNAL MEDICINE

## 2024-06-11 PROCEDURE — 3074F SYST BP LT 130 MM HG: CPT | Mod: CPTII,S$GLB,, | Performed by: INTERNAL MEDICINE

## 2024-06-11 PROCEDURE — 3288F FALL RISK ASSESSMENT DOCD: CPT | Mod: CPTII,S$GLB,, | Performed by: INTERNAL MEDICINE

## 2024-06-11 PROCEDURE — 3078F DIAST BP <80 MM HG: CPT | Mod: CPTII,S$GLB,, | Performed by: INTERNAL MEDICINE

## 2024-06-11 PROCEDURE — 3051F HG A1C>EQUAL 7.0%<8.0%: CPT | Mod: CPTII,S$GLB,, | Performed by: INTERNAL MEDICINE

## 2024-06-11 PROCEDURE — 1101F PT FALLS ASSESS-DOCD LE1/YR: CPT | Mod: CPTII,S$GLB,, | Performed by: INTERNAL MEDICINE

## 2024-06-11 PROCEDURE — 3008F BODY MASS INDEX DOCD: CPT | Mod: CPTII,S$GLB,, | Performed by: INTERNAL MEDICINE

## 2024-06-11 PROCEDURE — 99214 OFFICE O/P EST MOD 30 MIN: CPT | Mod: S$GLB,,, | Performed by: INTERNAL MEDICINE

## 2024-06-11 PROCEDURE — 1126F AMNT PAIN NOTED NONE PRSNT: CPT | Mod: CPTII,S$GLB,, | Performed by: INTERNAL MEDICINE

## 2024-06-11 PROCEDURE — 99999 PR PBB SHADOW E&M-EST. PATIENT-LVL III: CPT | Mod: PBBFAC,,, | Performed by: INTERNAL MEDICINE

## 2024-06-11 NOTE — PROGRESS NOTES
Patient ID: Josiah Perkins   Chief Complaint: Follow-up  MRN:  [id number]     Oncologic Diagnosis: CLL  Current Treatment: Surveillance  Subjective   Ms. Perkins presents for follow up and to review her lab results.     Since our last visit she had severe chest pain that required ED evaluation twice.  She underwent a cardiac stress test in May 2024 and had a cardiac catheterization 06/07/2024 which resulted normal.  She has no B symptoms and her chronic symptoms remain unchanged.  Labs reviewed and stable.    Review of Systems   Constitutional:  Negative for activity change, chills, diaphoresis, fatigue, fever and unexpected weight change.   Eyes:  Negative for visual disturbance.   Respiratory:  Negative for shortness of breath.    Cardiovascular:  Negative for chest pain.   Gastrointestinal:  Negative for abdominal distention, abdominal pain, blood in stool, constipation, diarrhea, nausea and vomiting.        + indigestion and gas   Genitourinary:  Positive for frequency. Negative for difficulty urinating and hematuria.   Musculoskeletal:  Negative for back pain and myalgias.   Skin:  Negative for rash.   Neurological:  Negative for light-headedness and headaches.   Hematological:  Negative for adenopathy. Does not bruise/bleed easily.   Psychiatric/Behavioral:  The patient is not nervous/anxious.      History     Oncology History   Chronic lymphocytic leukemia (CLL), clinical stage A   12/22/2023 Initial Diagnosis    Chronic lymphocytic leukemia (CLL), clinical stage A     12/22/2023 Cancer Staged    Staging form: Chronic Lymphocytic Leukemia / Small Lymphocytic Lymphoma, AJCC 8th Edition  - Clinical stage from 12/22/2023: Modified Stover Stage 0 (Modified Stover risk: Low, Binet: Stage A, Lymphocytosis: Present, Adenopathy: Absent, Organomegaly: Absent, Anemia: Absent, Thrombocytopenia: Absent)         Past Medical History:   Diagnosis Date    CKD (chronic kidney disease) stage 3, GFR 30-59 ml/min     Diabetes  mellitus, type 2     Hyperlipidemia        Past Surgical History:   Procedure Laterality Date    ANGIOGRAM, CORONARY, WITH LEFT HEART CATHETERIZATION N/A 2024    Procedure: Angiogram, Coronary, with Left Heart Cath;  Surgeon: Mahogany Ross MD;  Location: [facility] CATH LAB;  Service: Cardiology;  Laterality: N/A;    APPENDECTOMY      BREAST CYST ASPIRATION Right     2019    BREAST CYST EXCISION Right     CATARACT EXTRACTION       SECTION      x 2    COLONOSCOPY N/A 2024    Procedure: COLONOSCOPY;  Surgeon: Yane Yancey MD;  Location: [facility] ENDO;  Service: Endoscopy;  Laterality: N/A;    ESOPHAGOGASTRODUODENOSCOPY N/A 2024    Procedure: EGD (ESOPHAGOGASTRODUODENOSCOPY);  Surgeon: Yane Yancey MD;  Location: [facility] ENDO;  Service: Endoscopy;  Laterality: N/A;    HYSTERECTOMY      SINUS SURGERY      TONSILLECTOMY         Family History   Problem Relation Name Age of Onset    Alzheimer's disease Mother      Hyperlipidemia Mother      Thyroid cancer Sister      Heart failure Sister           at 42    Hodgkin's lymphoma Sister      Breast cancer Maternal Aunt      Breast cancer Maternal Aunt      Heart disease Maternal Grandmother      Colon cancer Maternal Grandfather      Heart disease Paternal Grandmother      Diabetes Neg Hx         Review of patient's allergies indicates:   Allergen Reactions    Codeine Nausea And Vomiting    Pcn [penicillins] Rash    Sulfa (sulfonamide antibiotics) Rash       Social History     Tobacco Use    Smoking status: Never    Smokeless tobacco: Never   Substance Use Topics    Alcohol use: Yes     Comment: glass of wine every other day    Drug use: Never       Physical Exam   ECOG:   ECOG SCORE    0 - Fully active-able to carry on all pre-disease performance without restriction          Vitals:  BP (!) 102/56   Pulse 81   Temp 97.7 °F (36.5 °C) (Temporal)   Ht 5' (1.524 m)   Wt 59.7 kg (131 lb 9.8 oz)   SpO2 97%   BMI 25.70 kg/m²     Physical  Exam:  Physical Exam  Constitutional:       General: She is not in acute distress.     Appearance: Normal appearance. She is not ill-appearing.   HENT:      Head: Normocephalic and atraumatic.   Eyes:      Extraocular Movements: Extraocular movements intact.      Conjunctiva/sclera: Conjunctivae normal.   Cardiovascular:      Rate and Rhythm: Normal rate and regular rhythm.      Heart sounds: No murmur heard.  Pulmonary:      Effort: Pulmonary effort is normal. No respiratory distress.      Breath sounds: Normal breath sounds. No wheezing, rhonchi or rales.   Abdominal:      General: Bowel sounds are normal. There is no distension.      Palpations: Abdomen is soft. There is no hepatomegaly, splenomegaly or mass.      Tenderness: There is no abdominal tenderness. There is no guarding.   Musculoskeletal:         General: Normal range of motion.      Cervical back: Neck supple. No rigidity or tenderness.      Right lower leg: No edema.      Left lower leg: No edema.   Lymphadenopathy:      Head:      Right side of head: No occipital adenopathy.      Left side of head: No occipital adenopathy.      Cervical: No cervical adenopathy.   Skin:     Findings: No rash.   Neurological:      General: No focal deficit present.      Mental Status: She is alert and oriented to person, place, and time.   Psychiatric:         Mood and Affect: Mood normal.         Behavior: Behavior normal.         Thought Content: Thought content normal.        Labs   Labs:  No visits with results within 2 Day(s) from this visit.   Latest known visit with results is:   Admission on 06/07/2024, Discharged on 06/07/2024   Component Date Value Ref Range Status    POCT Glucose 06/07/2024 147 (H)  70 - 110 mg/dL Final        Imaging   NM PET CT FDG Skull Base to Mid Thigh 11/29/23  Order: [id number]  Status: Final result       Visible to patient: Yes (seen)       Next appt: 01/02/2024 at 10:20 AM in Radiology ([facility] [id number])       Dx: Abnormal CBC    0  Result Notes  Details    Reading Physician Reading Date Result Priority   Hernando Spivey MD  [phone] 11/29/2023 STAT     Narrative & Impression  EXAMINATION:  NM PET CT FDG SKULL BASE TO MID THIGH     CLINICAL HISTORY:  B-cell lymphoproliferative disorder; assess for lymphadenopathy; Other specified abnormal findings of blood chemistry     TECHNIQUE:  11.51 mCi of F18-FDG was administered intravenously in the left antecubital fossa.  After an approximately 60 min distribution time, PET/CT images were acquired from the skull base to the mid thigh. Transmission images were acquired to correct for attenuation using a whole body low-dose CT scan without contrast with the arms positioned above the head. Glycemia at the time of injection was 147 mg/dL.     COMPARISON:  None     FINDINGS:  Quality of the study: Adequate.     SUV max of the liver parenchyma is 2.6.  SUV max of mediastinal blood pool is 2.1.     Neck: No lymphadenopathy.  No mass or abnormal FDG avidity.     Chest: No FDG avid mediastinal, hilar, or axillary lymphadenopathy.  Clear lungs.  No pleural effusion.     Abdomen/pelvis: No ascites or lymphadenopathy or mass.  No abnormal FDG avidity.     Skeletal structures: No abnormal FDG avidity.  No focal lytic or sclerotic lesion.     Physiologic uptake of the tracer is present within the brain, salivary glands, myocardium, GI and  tracts.     Incidental CT findings: N/A     Impression:     No lymphadenopathy or abnormal FDG avidity on this exam.  Normal PET-CT.     All CT scans at this facility are performed  using dose modulation techniques as appropriate to performed exam including the following:  automated exposure control; adjustment of mA and/or kV according to the patients size (this includes techniques or standardized protocols for targeted exams where dose is matched to indication/reason for exam: i.e. extremities or head);  iterative reconstruction technique.         Assessment and Plan   CLL  - Clinical Stage A  CLL-IPI Intermediate Risk (trisomy 12 in 50% of  nuclei and a 13q deletion of both chromosomes 13 in 49% of   nuclei. )  CLL FISH 11/17/23 confirmed the diagnosis; will defer bone marrow biopsy at this time  PET-CT showed no LAD   I discussed the diagnosis, indications for treatment, and significant side effects of BTK inhibitors; I provided her with and informational handout on her diagnosis  Labs reviewed and stable  No indication for treatment at this time (she has mild fatigue that is not function limiting and is imiproving)      Cancer Screening  MMG: Scheduled  PAP Smear: Hx hysterectomy  Colonoscopy 05/24/14: at [facility]; random bx taken for microscopic colitis and negative; colon normal; repeat 10 years - Refer to GI for C-scope and EGD given change in bowel habits as well as new UGI sx      Chronic Medical Conditions  CKD IIIa - stable; continue to monitor CMP  DM II  HLD  GERD  IBS      Med Onc Chart Routing      Follow up with physician 4 months.   Follow up with MARKO    Infusion scheduling note    Injection scheduling note    Labs CBC   Scheduling:  Preferred lab:  Lab interval:     Imaging    Pharmacy appointment    Other referrals                 > 30 minutes was spent in chart review and consultation with the patient.    The patient was seen, interviewed and examined. Pertinent lab and radiologic studies were reviewed. Pt instructed to call should they develop concerning signs/symptoms or have further questions.        Portions of the record may have been created with voice recognition software. Occasional wrong-word or sound-a-like substitutions may have occurred due to the inherent limitations of voice recognition software. Read the chart carefully and recognize, using context, where substitutions have occurred.      Jess Verdin MD    Hematology/Oncology

## 2024-06-17 ENCOUNTER — PATIENT OUTREACH (OUTPATIENT)
Dept: EMERGENCY MEDICINE | Facility: HOSPITAL | Age: 74
End: 2024-06-17
Payer: MEDICARE

## 2024-06-17 ENCOUNTER — OFFICE VISIT (OUTPATIENT)
Dept: CARDIOLOGY | Facility: CLINIC | Age: 74
End: 2024-06-17
Payer: MEDICARE

## 2024-06-17 VITALS
DIASTOLIC BLOOD PRESSURE: 60 MMHG | SYSTOLIC BLOOD PRESSURE: 110 MMHG | WEIGHT: 130.5 LBS | OXYGEN SATURATION: 97 % | HEART RATE: 79 BPM | BODY MASS INDEX: 25.49 KG/M2

## 2024-06-17 DIAGNOSIS — I25.10 CORONARY ARTERY DISEASE INVOLVING NATIVE CORONARY ARTERY OF NATIVE HEART WITHOUT ANGINA PECTORIS: Primary | ICD-10-CM

## 2024-06-17 DIAGNOSIS — Z98.890 HISTORY OF CORONARY ANGIOGRAM: ICD-10-CM

## 2024-06-17 PROCEDURE — 99999 PR PBB SHADOW E&M-EST. PATIENT-LVL III: CPT | Mod: PBBFAC,,, | Performed by: INTERNAL MEDICINE

## 2024-06-17 NOTE — PROGRESS NOTES
Subjective:   Patient ID:  Josiah Perkins is a 74 y.o. female who presents for evaluation of Chest Pain, Shortness of Breath, and Dizziness  6.17.2024  Comes in for follow-up after her recent coronary angiogram.    Which showed minimal CAD mostly to her LAD.  However no focal stenosis.  He does have significantly tortuous coronaries however.    Continues to have same atypical chest pain.    Her access site looks normal.  She is somewhat tender on her right upper arm however no swelling or hematoma only minimal symptoms however.    Complains of feeling dizzy since getting on amlodipine.    5.29.2024  Comes in for follow-up.  She had her stress echo after last visit it showed 5 mm ST-depression along with inferoapical wall hypokinesis with the stress.    She continues to have chest pain.  She states that her chest pain is worse lately.  He states that her back pain has resolved but she continues to have midsternal chest pain calls it significant enough to be worried about..        5.23.2024  74-year-old female with history as below including diabetes and CKD.    And hyperlipidemia.  Never smoker.    Comes in for evaluation of chest pain that started a few weeks ago.  Her pain is described to the left side of the chest and radiates to the back.    She states it feels better sometimes when she lifts her arms up.  She states that her pain however started to move to the midsternal part of her chest and she was more concerned and came in for a evaluation.    Her symptoms appear however to be exertional.    No dyspnea on exertion.    Her pain is not reproducible on exam.  Past Medical History:   Diagnosis Date    CKD (chronic kidney disease) stage 3, GFR 30-59 ml/min     Diabetes mellitus, type 2     Hyperlipidemia        Past Surgical History:   Procedure Laterality Date    ANGIOGRAM, CORONARY, WITH LEFT HEART CATHETERIZATION N/A 6/7/2024    Procedure: Angiogram, Coronary, with Left Heart Cath;  Surgeon: Mahogany Ross  MD;  Location: [facility] CATH LAB;  Service: Cardiology;  Laterality: N/A;    APPENDECTOMY      BREAST CYST ASPIRATION Right     2019    BREAST CYST EXCISION Right     CATARACT EXTRACTION       SECTION      x 2    COLONOSCOPY N/A 2024    Procedure: COLONOSCOPY;  Surgeon: Yane Yancey MD;  Location: [facility] ENDO;  Service: Endoscopy;  Laterality: N/A;    ESOPHAGOGASTRODUODENOSCOPY N/A 2024    Procedure: EGD (ESOPHAGOGASTRODUODENOSCOPY);  Surgeon: Yane Yancey MD;  Location: [facility] ENDO;  Service: Endoscopy;  Laterality: N/A;    HYSTERECTOMY      SINUS SURGERY      TONSILLECTOMY         Social History     Tobacco Use    Smoking status: Never    Smokeless tobacco: Never   Substance Use Topics    Alcohol use: Yes     Comment: glass of wine every other day    Drug use: Never       Family History   Problem Relation Name Age of Onset    Alzheimer's disease Mother      Hyperlipidemia Mother      Thyroid cancer Sister      Heart failure Sister           at 42    Hodgkin's lymphoma Sister      Breast cancer Maternal Aunt      Breast cancer Maternal Aunt      Heart disease Maternal Grandmother      Colon cancer Maternal Grandfather      Heart disease Paternal Grandmother      Diabetes Neg Hx         Review of Systems   Cardiovascular:  Positive for chest pain. Negative for dyspnea on exertion, palpitations and syncope.   Respiratory:  Positive for shortness of breath.    Genitourinary: Negative.    Neurological:  Positive for dizziness.       Current Outpatient Medications on File Prior to Visit   Medication Sig    amLODIPine (NORVASC) 2.5 MG tablet Take 1 tablet (2.5 mg total) by mouth once daily.    atorvastatin (LIPITOR) 20 MG tablet Take 1 tablet (20 mg total) by mouth every evening.    JARDIANCE 25 mg tablet TAKE 1 TABLET(25 MG) BY MOUTH EVERY DAY    multivitamin capsule Take 1 capsule by mouth once daily.    nitroGLYCERIN (NITROSTAT) 0.4 MG SL tablet Place 1 tablet (0.4 mg total) under the tongue  every 5 (five) minutes as needed for Chest pain.    omeprazole (PRILOSEC) 20 MG capsule Take 1 capsule (20 mg total) by mouth once daily.    pimecrolimus (ELIDEL) 1 % cream Apply topically 2 (two) times daily. PRN eyelid rash flares. Non-steroid.    traZODone (DESYREL) 100 MG tablet TAKE 1 TABLET BY MOUTH EVERY NIGHT AT BEDTIME AS NEEDED FOR INSOMNIA    vitamin E 400 UNIT capsule Take 1 capsule (400 Units total) by mouth 2 (two) times daily.    pantoprazole (PROTONIX) 40 MG tablet Take 1 tablet (40 mg total) by mouth once daily. (Patient not taking: Reported on 6/17/2024)     No current facility-administered medications on file prior to visit.       Objective:   Objective:  Wt Readings from Last 3 Encounters:   06/17/24 59.2 kg (130 lb 8.2 oz)   06/11/24 59.7 kg (131 lb 9.8 oz)   05/29/24 59.6 kg (131 lb 6.3 oz)     Temp Readings from Last 3 Encounters:   06/11/24 97.7 °F (36.5 °C) (Temporal)   06/07/24 96.9 °F (36.1 °C) (Temporal)   05/20/24 97.6 °F (36.4 °C) (Oral)     BP Readings from Last 3 Encounters:   06/17/24 110/60   06/11/24 (!) 102/56   06/07/24 113/61     Pulse Readings from Last 3 Encounters:   06/17/24 79   06/11/24 81   06/07/24 72       Physical Exam  Vitals reviewed.   Constitutional:       Appearance: She is well-developed.   Neck:      Vascular: No carotid bruit.   Cardiovascular:      Rate and Rhythm: Normal rate and regular rhythm.      Pulses: Intact distal pulses.      Heart sounds: Normal heart sounds. No murmur heard.  Pulmonary:      Breath sounds: Normal breath sounds.   Neurological:      Mental Status: She is oriented to person, place, and time.         Lab Results   Component Value Date    CHOL 185 06/06/2024    CHOL 168 11/01/2023    CHOL 165 10/31/2022     Lab Results   Component Value Date    HDL 58 06/06/2024    HDL 61 11/01/2023    HDL 59 10/31/2022     Lab Results   Component Value Date    LDLCALC 73.8 06/06/2024    LDLCALC 73.2 11/01/2023    LDLCALC 73.0 10/31/2022     Lab  Results   Component Value Date    TRIG 266 (H) 06/06/2024    TRIG 169 (H) 11/01/2023    TRIG 165 (H) 10/31/2022     Lab Results   Component Value Date    CHOLHDL 31.4 06/06/2024    CHOLHDL 36.3 11/01/2023    CHOLHDL 35.8 10/31/2022       Chemistry        Component Value Date/Time     06/06/2024 1057    K 4.3 06/06/2024 1057     06/06/2024 1057    CO2 24 06/06/2024 1057    BUN 20 06/06/2024 1057    CREATININE 1.1 06/06/2024 1057     (H) 06/06/2024 1057        Component Value Date/Time    CALCIUM 9.7 06/06/2024 1057    ALKPHOS 54 (L) 06/06/2024 1057    AST 19 06/06/2024 1057    ALT 18 06/06/2024 1057    BILITOT 0.4 06/06/2024 1057    ESTGFRAFRICA >60.0 03/28/2022 1019    EGFRNONAA >60.0 03/28/2022 1019          Lab Results   Component Value Date    TSH 2.361 06/06/2024     Lab Results   Component Value Date    INR 0.9 05/29/2024     Lab Results   Component Value Date    WBC 14.49 (H) 06/06/2024    HGB 14.3 06/06/2024    HCT 44.8 06/06/2024    MCV 98 06/06/2024     06/06/2024     BNP  @LABRCNTIP(BNP,BNPTRIAGEBLO)@  CrCl cannot be calculated (Patient's most recent lab result is older than the maximum 7 days allowed.).     Imaging:  ======    No results found for this or any previous visit.    No results found for this or any previous visit.    No results found for this or any previous visit.    No results found for this or any previous visit.    No valid procedures specified.    No results found for this or any previous visit.      No results found for this or any previous visit.      No results found for this or any previous visit.      Diagnostic Results:  ECG: Reviewed    Summary 5.27.2024  Show Result Comparison     Left Ventricle: The left ventricle is normal in size. Normal wall thickness. There is normal systolic function with a visually estimated ejection fraction of 55 - 60%. Grade I diastolic dysfunction.    Right Ventricle: Normal right ventricular cavity size. Systolic function is  normal.    Tricuspid Valve: There is mild regurgitation.    Pulmonary Artery: The estimated pulmonary artery systolic pressure is 29 mmHg.    IVC/SVC: Normal venous pressure at 3 mmHg.    Stress Protocol: The patient exercised for 7 minutes 14 seconds on a Salvador protocol, corresponding to a functional capacity of 9METS, achieving a peak heart rate of 134 bpm, which is 95% of the age predicted maximum heart rate. The patient experienced no angina during the test. Their exercise capacity was normal. The patient reported no symptoms during the stress test. The test was stopped because the end of the protocol was reached.    Baseline ECG: The Baseline ECG reveals sinus rhythm with 1st degree A/V block. The axis is normal. The baseline ECG has non-specific ST-T wave changes.    Stress ECG: There is 5.0 mm of upward-sloping ST segment depression noted during stress. There are no arrhythmias during stress.    There is hypertensive blood pressure response with stress.    ECG Conclusion: The ECG portion of the study is negative for ischemia.    Post-stress Impression: The apical septal and mid inferoseptal wall is hypokinetic at peak stress consistent with ischemia.           The 10-year ASCVD risk score (Aston DK, et al., 2019) is: 17.9%    Values used to calculate the score:      Age: 74 years      Sex: Female      Is Non- : No      Diabetic: Yes      Tobacco smoker: No      Systolic Blood Pressure: 110 mmHg      Is BP treated: No      HDL Cholesterol: 58 mg/dL      Total Cholesterol: 185 mg/dL        Assessment and Plan:   Other chest pain    Abnormal stress test    First degree AV block    Chronic lymphocytic leukemia (CLL), clinical stage A    Dyslipidemia associated with type 2 diabetes mellitus    Coronary artery disease involving native coronary artery of native heart without angina pectoris  -     CV Ultrasound Bilateral Doppler Carotid; Future    History of coronary  angiogram        Reviewed all tests and above medical conditions with patient in detail and formulated treatment plan.  Risk factor modification discussed.   Cardiac low salt diet discussed.  Maintaining healthy weight and weight loss goals were discussed in clinic.  Aspirin 81 mg daily.    Continue with statins.    X-ray of the thoracic spine was normal..  Reviewed coronary angiogram films with the patient.    Atypical chest pain likely noncardiac.  Follow with PCP.  Follow up six-month

## 2024-06-17 NOTE — PROGRESS NOTES
Pt was seen in the ED on 6/7/24. Pt was scheduled a Post ED visit follow up appt to establish care with a new pcp and was contacted to provide a reminder for the upcoming appt scheduled with Dr. Samantha Millan on 6/19/24 at 3:00 p.m. Closing Encounter.     Maranda Fall

## 2024-06-19 ENCOUNTER — OFFICE VISIT (OUTPATIENT)
Dept: INTERNAL MEDICINE | Facility: CLINIC | Age: 74
End: 2024-06-19
Payer: MEDICARE

## 2024-06-19 VITALS
HEIGHT: 60 IN | RESPIRATION RATE: 18 BRPM | BODY MASS INDEX: 25.36 KG/M2 | DIASTOLIC BLOOD PRESSURE: 70 MMHG | OXYGEN SATURATION: 97 % | SYSTOLIC BLOOD PRESSURE: 110 MMHG | WEIGHT: 129.19 LBS | HEART RATE: 85 BPM

## 2024-06-19 DIAGNOSIS — E11.69 DYSLIPIDEMIA ASSOCIATED WITH TYPE 2 DIABETES MELLITUS: Primary | ICD-10-CM

## 2024-06-19 DIAGNOSIS — C91.10 CHRONIC LYMPHOCYTIC LEUKEMIA (CLL), CLINICAL STAGE A: ICD-10-CM

## 2024-06-19 DIAGNOSIS — K21.9 GASTROESOPHAGEAL REFLUX DISEASE, UNSPECIFIED WHETHER ESOPHAGITIS PRESENT: ICD-10-CM

## 2024-06-19 DIAGNOSIS — F41.9 INSOMNIA SECONDARY TO ANXIETY: ICD-10-CM

## 2024-06-19 DIAGNOSIS — E78.5 DYSLIPIDEMIA ASSOCIATED WITH TYPE 2 DIABETES MELLITUS: Primary | ICD-10-CM

## 2024-06-19 DIAGNOSIS — E11.65 UNCONTROLLED TYPE 2 DIABETES MELLITUS WITH HYPERGLYCEMIA: ICD-10-CM

## 2024-06-19 DIAGNOSIS — F51.05 INSOMNIA SECONDARY TO ANXIETY: ICD-10-CM

## 2024-06-19 PROBLEM — L23.7 POISON IVY DERMATITIS: Status: RESOLVED | Noted: 2022-08-25 | Resolved: 2024-06-19

## 2024-06-19 PROBLEM — K30 INDIGESTION: Status: RESOLVED | Noted: 2024-01-09 | Resolved: 2024-06-19

## 2024-06-19 PROBLEM — Z12.11 SCREENING FOR COLON CANCER: Status: RESOLVED | Noted: 2024-01-09 | Resolved: 2024-06-19

## 2024-06-19 PROCEDURE — 99214 OFFICE O/P EST MOD 30 MIN: CPT | Mod: S$GLB,,, | Performed by: FAMILY MEDICINE

## 2024-06-19 PROCEDURE — 3074F SYST BP LT 130 MM HG: CPT | Mod: CPTII,S$GLB,, | Performed by: FAMILY MEDICINE

## 2024-06-19 PROCEDURE — 3288F FALL RISK ASSESSMENT DOCD: CPT | Mod: CPTII,S$GLB,, | Performed by: FAMILY MEDICINE

## 2024-06-19 PROCEDURE — 1160F RVW MEDS BY RX/DR IN RCRD: CPT | Mod: CPTII,S$GLB,, | Performed by: FAMILY MEDICINE

## 2024-06-19 PROCEDURE — 3078F DIAST BP <80 MM HG: CPT | Mod: CPTII,S$GLB,, | Performed by: FAMILY MEDICINE

## 2024-06-19 PROCEDURE — 99999 PR PBB SHADOW E&M-EST. PATIENT-LVL IV: CPT | Mod: PBBFAC,,, | Performed by: FAMILY MEDICINE

## 2024-06-19 PROCEDURE — 1159F MED LIST DOCD IN RCRD: CPT | Mod: CPTII,S$GLB,, | Performed by: FAMILY MEDICINE

## 2024-06-19 PROCEDURE — 3051F HG A1C>EQUAL 7.0%<8.0%: CPT | Mod: CPTII,S$GLB,, | Performed by: FAMILY MEDICINE

## 2024-06-19 PROCEDURE — 3008F BODY MASS INDEX DOCD: CPT | Mod: CPTII,S$GLB,, | Performed by: FAMILY MEDICINE

## 2024-06-19 PROCEDURE — 1101F PT FALLS ASSESS-DOCD LE1/YR: CPT | Mod: CPTII,S$GLB,, | Performed by: FAMILY MEDICINE

## 2024-06-19 RX ORDER — OMEPRAZOLE 20 MG/1
20 CAPSULE, DELAYED RELEASE ORAL DAILY
Qty: 180 CAPSULE | Refills: 1 | Status: SHIPPED | OUTPATIENT
Start: 2024-06-19

## 2024-06-19 RX ORDER — TRAZODONE HYDROCHLORIDE 100 MG/1
100 TABLET ORAL NIGHTLY PRN
Qty: 90 TABLET | Refills: 1 | Status: SHIPPED | OUTPATIENT
Start: 2024-06-19

## 2024-06-19 RX ORDER — ROSUVASTATIN CALCIUM 20 MG/1
20 TABLET, COATED ORAL DAILY
Qty: 90 TABLET | Refills: 3 | Status: SHIPPED | OUTPATIENT
Start: 2024-06-19 | End: 2025-06-19

## 2024-06-19 NOTE — PROGRESS NOTES
Subjective:       Patient ID: Josiah Perkins is a 74 y.o. female.    Chief Complaint: Follow-up    74-year-old female patient new to my clinic here to establish care.  Patient with Patient Active Problem List:     Uncontrolled type 2 diabetes mellitus with hyperglycemia     Dyslipidemia associated with type 2 diabetes mellitus     Chronic lymphocytic leukemia (CLL), clinical stage A     Gastroesophageal reflux disease     Insomnia secondary to anxiety  Here for follow-up on recent labs  Patient requesting refills on her medications today  Reports that she has been taking her cholesterol medication regularly and has been followed by Oncology Dr. Verdin for CLL and has been doing well  Has not been exercising lately for the past few months which could explain her elevated A1c        Review of Systems   Constitutional:  Negative for fatigue.   Eyes:  Negative for visual disturbance.   Respiratory:  Negative for shortness of breath.    Cardiovascular:  Negative for chest pain and leg swelling.   Gastrointestinal:  Negative for abdominal pain, nausea and vomiting.   Endocrine: Negative for polydipsia and polyuria.   Musculoskeletal:  Negative for myalgias.   Skin:  Negative for rash.   Neurological:  Negative for weakness, light-headedness, numbness and headaches.   Psychiatric/Behavioral:  Negative for sleep disturbance.          /70 (BP Location: Right arm, Patient Position: Sitting, BP Method: Medium (Manual))   Pulse 85   Resp 18   Ht 5' (1.524 m)   Wt 58.6 kg (129 lb 3 oz)   SpO2 97%   BMI 25.23 kg/m²   Objective:      Physical Exam  Constitutional:       Appearance: She is well-developed.   HENT:      Head: Normocephalic and atraumatic.   Cardiovascular:      Rate and Rhythm: Normal rate and regular rhythm.      Heart sounds: Normal heart sounds. No murmur heard.  Pulmonary:      Effort: Pulmonary effort is normal.      Breath sounds: Normal breath sounds. No wheezing.   Abdominal:      General: Bowel  sounds are normal.      Palpations: Abdomen is soft.      Tenderness: There is no abdominal tenderness.   Skin:     General: Skin is warm and dry.      Findings: No rash.   Neurological:      Mental Status: She is alert and oriented to person, place, and time.   Psychiatric:         Mood and Affect: Mood normal.         Admission on 06/07/2024, Discharged on 06/07/2024   Component Date Value Ref Range Status    POCT Glucose 06/07/2024 147 (H)  70 - 110 mg/dL Final   Lab Visit on 06/06/2024   Component Date Value Ref Range Status    Phosphorus 06/06/2024 4.0  2.7 - 4.5 mg/dL Final    Magnesium 06/06/2024 2.1  1.6 - 2.6 mg/dL Final    Sodium 06/06/2024 138  136 - 145 mmol/L Final    Potassium 06/06/2024 4.3  3.5 - 5.1 mmol/L Final    Chloride 06/06/2024 104  95 - 110 mmol/L Final    CO2 06/06/2024 24  23 - 29 mmol/L Final    Glucose 06/06/2024 167 (H)  70 - 110 mg/dL Final    BUN 06/06/2024 20  8 - 23 mg/dL Final    Creatinine 06/06/2024 1.1  0.5 - 1.4 mg/dL Final    Calcium 06/06/2024 9.7  8.7 - 10.5 mg/dL Final    Total Protein 06/06/2024 7.4  6.0 - 8.4 g/dL Final    Albumin 06/06/2024 4.6  3.5 - 5.2 g/dL Final    Total Bilirubin 06/06/2024 0.4  0.1 - 1.0 mg/dL Final    Comment: For infants and newborns, interpretation of results should be based  on gestational age, weight and in agreement with clinical  observations.    Premature Infant recommended reference ranges:  Up to 24 hours.............<8.0 mg/dL  Up to 48 hours............<12.0 mg/dL  3-5 days..................<15.0 mg/dL  6-29 days.................<15.0 mg/dL      Alkaline Phosphatase 06/06/2024 54 (L)  55 - 135 U/L Final    AST 06/06/2024 19  10 - 40 U/L Final    ALT 06/06/2024 18  10 - 44 U/L Final    eGFR 06/06/2024 53 (A)  >60 mL/min/1.73 m^2 Final    Anion Gap 06/06/2024 10  8 - 16 mmol/L Final    WBC 06/06/2024 14.49 (H)  3.90 - 12.70 K/uL Final    RBC 06/06/2024 4.56  4.00 - 5.40 M/uL Final    Hemoglobin 06/06/2024 14.3  12.0 - 16.0 g/dL Final     Hematocrit 06/06/2024 44.8  37.0 - 48.5 % Final    MCV 06/06/2024 98  82 - 98 fL Final    MCH 06/06/2024 31.4 (H)  27.0 - 31.0 pg Final    MCHC 06/06/2024 31.9 (L)  32.0 - 36.0 g/dL Final    RDW 06/06/2024 13.0  11.5 - 14.5 % Final    Platelets 06/06/2024 239  150 - 450 K/uL Final    MPV 06/06/2024 11.6  9.2 - 12.9 fL Final    Immature Granulocytes 06/06/2024 CANCELED  0.0 - 0.5 % Final    Result canceled by the ancillary.    Immature Grans (Abs) 06/06/2024 CANCELED  0.00 - 0.04 K/uL Final    Comment: Mild elevation in immature granulocytes is non specific and   can be seen in a variety of conditions including stress response,   acute inflammation, trauma and pregnancy. Correlation with other   laboratory and clinical findings is essential.    Result canceled by the ancillary.      nRBC 06/06/2024 0  0 /100 WBC Final    Gran % 06/06/2024 34.0 (L)  38.0 - 73.0 % Final    Lymph % 06/06/2024 59.0 (H)  18.0 - 48.0 % Final    Mono % 06/06/2024 5.0  4.0 - 15.0 % Final    Eosinophil % 06/06/2024 1.0  0.0 - 8.0 % Final    Basophil % 06/06/2024 0.0  0.0 - 1.9 % Final    Bands 06/06/2024 1.0  % Final    Platelet Estimate 06/06/2024 Appears normal   Final    Poik 06/06/2024 Slight   Final    Bubba Cells 06/06/2024 Occasional   Final    Large/Giant Platelets 06/06/2024 Absent   Final    Differential Method 06/06/2024 Manual   Final    LD 06/06/2024 131  110 - 260 U/L Final    Results are increased in hemolyzed samples.    Hemoglobin A1C 06/06/2024 7.5 (H)  4.0 - 5.6 % Final    Comment: ADA Screening Guidelines:  5.7-6.4%  Consistent with prediabetes  >or=6.5%  Consistent with diabetes    High levels of fetal hemoglobin interfere with the HbA1C  assay. Heterozygous hemoglobin variants (HbS, HgC, etc)do  not significantly interfere with this assay.   However, presence of multiple variants may affect accuracy.      Estimated Avg Glucose 06/06/2024 169 (H)  68 - 131 mg/dL Final    Cholesterol 06/06/2024 185  120 - 199 mg/dL Final     Comment: The National Cholesterol Education Program (NCEP) has set the  following guidelines (reference ranges) for Cholesterol:  Optimal.....................<200 mg/dL  Borderline High.............200-239 mg/dL  High........................> or = 240 mg/dL      Triglycerides 06/06/2024 266 (H)  30 - 150 mg/dL Final    Comment: The National Cholesterol Education Program (NCEP) has set the  following guidelines (reference values) for triglycerides:  Normal......................<150 mg/dL  Borderline High.............150-199 mg/dL  High........................200-499 mg/dL      HDL 06/06/2024 58  40 - 75 mg/dL Final    Comment: The National Cholesterol Education Program (NCEP) has set the  following guidelines (reference values) for HDL Cholesterol:  Low...............<40 mg/dL  Optimal...........>60 mg/dL      LDL Cholesterol 06/06/2024 73.8  63.0 - 159.0 mg/dL Final    Comment: The National Cholesterol Education Program (NCEP) has set the  following guidelines (reference values) for LDL Cholesterol:  Optimal.......................<130 mg/dL  Borderline High...............130-159 mg/dL  High..........................160-189 mg/dL  Very High.....................>190 mg/dL      HDL/Cholesterol Ratio 06/06/2024 31.4  20.0 - 50.0 % Final    Total Cholesterol/HDL Ratio 06/06/2024 3.2  2.0 - 5.0 Final    Non-HDL Cholesterol 06/06/2024 127  mg/dL Final    Comment: Risk category and Non-HDL cholesterol goals:  Coronary heart disease (CHD)or equivalent (10-year risk of CHD >20%):  Non-HDL cholesterol goal     <130 mg/dL  Two or more CHD risk factors and 10-year risk of CHD <= 20%:  Non-HDL cholesterol goal     <160 mg/dL  0 to 1 CHD risk factor:  Non-HDL cholesterol goal     <190 mg/dL      TSH 06/06/2024 2.361  0.400 - 4.000 uIU/mL Final       Assessment/Plan:   1. Dyslipidemia associated with type 2 diabetes mellitus  -     rosuvastatin (CRESTOR) 20 MG tablet; Take 1 tablet (20 mg total) by mouth once daily.  Dispense: 90  tablet; Refill: 3  Reviewed recent labs showing elevated cholesterol levels not under goal, will change Lipitor to Crestor 20 mg daily and follow-up in 4 months    2. Uncontrolled type 2 diabetes mellitus with hyperglycemia  -     Ambulatory referral/consult to Optometry; Future; Expected date: 06/26/2024  -     empagliflozin (JARDIANCE) 25 mg tablet; Take 1 tablet (25 mg total) by mouth once daily.  Dispense: 90 tablet; Refill: 1  Refill given on Jardiance 25 mg daily  Patient was encouraged to work on lifestyle modifications with diet and exercise to lower A1c  Due for eye exam  Strict lifestyle changes recommended with 1800 ADA low-fat and low-cholesterol diet and exercise 30 minutes daily      3. Chronic lymphocytic leukemia (CLL), clinical stage A  Followed by Hematology/Oncology, stable    4. Gastroesophageal reflux disease, unspecified whether esophagitis present  -     omeprazole (PRILOSEC) 20 MG capsule; Take 1 capsule (20 mg total) by mouth once daily.  Dispense: 180 capsule; Refill: 1  Clinically doing well on omeprazole 20 mg twice daily    5. Insomnia secondary to anxiety  -     traZODone (DESYREL) 100 MG tablet; Take 1 tablet (100 mg total) by mouth nightly as needed for Insomnia.  Dispense: 90 tablet; Refill: 1  Stable on trazodone as needed    Encouraged to consider getting tetanus and RSV vaccination at outside pharmacy  Not interested in taking shingles

## 2024-06-27 ENCOUNTER — HOSPITAL ENCOUNTER (OUTPATIENT)
Dept: CARDIOLOGY | Facility: HOSPITAL | Age: 74
Discharge: HOME | End: 2024-06-27
Attending: INTERNAL MEDICINE
Payer: MEDICARE

## 2024-06-27 VITALS
SYSTOLIC BLOOD PRESSURE: 128 MMHG | WEIGHT: 130 LBS | BODY MASS INDEX: 25.52 KG/M2 | DIASTOLIC BLOOD PRESSURE: 73 MMHG | HEIGHT: 60 IN

## 2024-06-27 DIAGNOSIS — I25.10 CORONARY ARTERY DISEASE INVOLVING NATIVE CORONARY ARTERY OF NATIVE HEART WITHOUT ANGINA PECTORIS: ICD-10-CM

## 2024-06-27 LAB
LEFT ARM DIASTOLIC BLOOD PRESSURE: 73 MMHG
LEFT ARM SYSTOLIC BLOOD PRESSURE: 128 MMHG
LEFT CBA DIAS: 12 CM/S
LEFT CBA SYS: 56 CM/S
LEFT CCA DIST DIAS: 16 CM/S
LEFT CCA DIST SYS: 67 CM/S
LEFT CCA MID DIAS: 20 CM/S
LEFT CCA MID SYS: 81 CM/S
LEFT CCA PROX DIAS: 14 CM/S
LEFT CCA PROX SYS: 81 CM/S
LEFT ECA DIAS: 10 CM/S
LEFT ECA SYS: 81 CM/S
LEFT ICA DIST DIAS: 39 CM/S
LEFT ICA DIST SYS: 107 CM/S
LEFT ICA MID DIAS: 43 CM/S
LEFT ICA MID SYS: 114 CM/S
LEFT ICA PROX DIAS: 33 CM/S
LEFT ICA PROX SYS: 91 CM/S
LEFT VERTEBRAL DIAS: 17 CM/S
LEFT VERTEBRAL SYS: 61 CM/S
OHS CV CAROTID RIGHT ICA EDV HIGHEST: 32
OHS CV CAROTID ULTRASOUND LEFT ICA/CCA RATIO: 1.7
OHS CV CAROTID ULTRASOUND RIGHT ICA/CCA RATIO: 1.61
OHS CV PV CAROTID LEFT HIGHEST CCA: 81
OHS CV PV CAROTID LEFT HIGHEST ICA: 114
OHS CV PV CAROTID RIGHT HIGHEST CCA: 82
OHS CV PV CAROTID RIGHT HIGHEST ICA: 106
OHS CV US CAROTID LEFT HIGHEST EDV: 43
RIGHT ARM DIASTOLIC BLOOD PRESSURE: 71 MMHG
RIGHT ARM SYSTOLIC BLOOD PRESSURE: 110 MMHG
RIGHT CBA DIAS: 12 CM/S
RIGHT CBA SYS: 38 CM/S
RIGHT CCA DIST DIAS: 19 CM/S
RIGHT CCA DIST SYS: 66 CM/S
RIGHT CCA MID DIAS: 15 CM/S
RIGHT CCA MID SYS: 69 CM/S
RIGHT CCA PROX DIAS: 14 CM/S
RIGHT CCA PROX SYS: 82 CM/S
RIGHT ECA DIAS: 7 CM/S
RIGHT ECA SYS: 96 CM/S
RIGHT ICA DIST DIAS: 31 CM/S
RIGHT ICA DIST SYS: 95 CM/S
RIGHT ICA MID DIAS: 32 CM/S
RIGHT ICA MID SYS: 106 CM/S
RIGHT ICA PROX DIAS: 22 CM/S
RIGHT ICA PROX SYS: 54 CM/S
RIGHT VERTEBRAL DIAS: 25 CM/S
RIGHT VERTEBRAL SYS: 67 CM/S

## 2024-06-27 PROCEDURE — 93880 EXTRACRANIAL BILAT STUDY: CPT | Mod: 26,,, | Performed by: INTERNAL MEDICINE

## 2024-06-27 PROCEDURE — 93880 EXTRACRANIAL BILAT STUDY: CPT

## 2024-07-15 ENCOUNTER — OFFICE VISIT (OUTPATIENT)
Dept: OPHTHALMOLOGY | Facility: CLINIC | Age: 74
End: 2024-07-15
Payer: MEDICARE

## 2024-07-15 DIAGNOSIS — E11.9 DIABETES MELLITUS TYPE 2 WITHOUT RETINOPATHY: Primary | ICD-10-CM

## 2024-07-15 DIAGNOSIS — E11.65 UNCONTROLLED TYPE 2 DIABETES MELLITUS WITH HYPERGLYCEMIA: ICD-10-CM

## 2024-07-15 DIAGNOSIS — H02.883 MEIBOMIAN GLAND DYSFUNCTION (MGD) OF BOTH EYES: ICD-10-CM

## 2024-07-15 DIAGNOSIS — Z96.1 PSEUDOPHAKIA OF BOTH EYES: ICD-10-CM

## 2024-07-15 DIAGNOSIS — H02.886 MEIBOMIAN GLAND DYSFUNCTION (MGD) OF BOTH EYES: ICD-10-CM

## 2024-07-15 PROCEDURE — 2023F DILAT RTA XM W/O RTNOPTHY: CPT | Mod: CPTII,S$GLB,, | Performed by: OPHTHALMOLOGY

## 2024-07-15 PROCEDURE — 92015 DETERMINE REFRACTIVE STATE: CPT | Mod: S$GLB,,, | Performed by: OPHTHALMOLOGY

## 2024-07-15 PROCEDURE — 1159F MED LIST DOCD IN RCRD: CPT | Mod: CPTII,S$GLB,, | Performed by: OPHTHALMOLOGY

## 2024-07-15 PROCEDURE — 3051F HG A1C>EQUAL 7.0%<8.0%: CPT | Mod: CPTII,S$GLB,, | Performed by: OPHTHALMOLOGY

## 2024-07-15 PROCEDURE — 99203 OFFICE O/P NEW LOW 30 MIN: CPT | Mod: S$GLB,,, | Performed by: OPHTHALMOLOGY

## 2024-07-15 PROCEDURE — 99999 PR PBB SHADOW E&M-EST. PATIENT-LVL III: CPT | Mod: PBBFAC,,, | Performed by: OPHTHALMOLOGY

## 2024-07-15 PROCEDURE — 1160F RVW MEDS BY RX/DR IN RCRD: CPT | Mod: CPTII,S$GLB,, | Performed by: OPHTHALMOLOGY

## 2024-07-15 NOTE — PROGRESS NOTES
HPI     Diabetic Eye Exam     Additional comments: Pt reports for diabetic eye exam. Denies any pain or   irritation. Va blurry. No drops.            Comments    1. +DM  2. Refractive Error  3. PCIOL OU  4. FM HX COAG(MG)             Last edited by Barber Pappas on 7/15/2024 10:39 AM.            Assessment /Plan     For exam results, see Encounter Report.    Diabetes mellitus type 2 without retinopathy  Last A1c 7.5 . Diabetes controlled with no diabetic retinopathy on dilated exam. Reviewed diabetic eye precautions including excellent blood sugar control, and importance of regular follow up.     Pseudophakia of both eyes  Stable, monitor yearly. Mrx for readers provided.    Meibomian gland dysfunction (MGD) of both eyes  Start warm compresses at least BID with ATs prn.      RTC 1 year, sooner prn

## 2024-07-23 ENCOUNTER — PATIENT MESSAGE (OUTPATIENT)
Dept: INTERNAL MEDICINE | Facility: CLINIC | Age: 74
End: 2024-07-23
Payer: MEDICARE

## 2024-07-23 ENCOUNTER — E-VISIT (OUTPATIENT)
Dept: FAMILY MEDICINE | Facility: CLINIC | Age: 74
End: 2024-07-23
Payer: MEDICARE

## 2024-07-23 DIAGNOSIS — N76.0 ACUTE VAGINITIS: Primary | ICD-10-CM

## 2024-07-23 RX ORDER — METRONIDAZOLE 7.5 MG/G
1 GEL VAGINAL DAILY
Qty: 5 G | Refills: 0 | Status: SHIPPED | OUTPATIENT
Start: 2024-07-23 | End: 2024-07-28

## 2024-07-23 RX ORDER — FLUCONAZOLE 150 MG/1
150 TABLET ORAL ONCE
Qty: 1 TABLET | Refills: 1 | Status: SHIPPED | OUTPATIENT
Start: 2024-07-23 | End: 2024-07-23

## 2024-07-23 NOTE — PROGRESS NOTES
Patient ID: Josiah Perkins is a 74 y.o. female.    Chief Complaint: Vaginal Discharge (Entered automatically based on patient selection in [employer].)          274}  The patient initiated a request through [employer] on 7/23/2024 for evaluation and management with a chief complaint of Vaginal Discharge (Entered automatically based on patient selection in [employer].)     I evaluated the questionnaire submission on 07/23/2024 .    Total Time (in minutes): 11     Ohs Peq Evisit Vaginal Discharge    7/23/2024 12:39 PM CDT - Filed by Patient   Do you agree to participate in an E-Visit? Yes   If you have any of the following symptoms,  please do not complete an E-Visit,  schedule an appointment with your provider: I acknowledge   Choose the state of your primary residence [place]   What is the main issue you would like addressed today? Recurring Vaginal infection   Which of the following are you experiencing? Vaginal Itching;  Vaginal discharge    Are you having pain while passing urine? No, I have no pain while urinating.   Which of the following applies to your vaginal discharge? I have a white/milky discharge.    Which of the following are you experiencing? Frequent urination   Do you have any sores on your genitals? Yes    Have you taken antibiotics recently? I have not been on any antibiotics    Do you use any of the following? Douches;  Vaginal sprays   Which of the following applies to your menstrual period? I do not have menstrual periods   Have you had similar symptoms in the past? Yes, I have these symptoms frequently.   When you had similar symptoms in the past, did any of the following work? Pills for yeast infection   Have you had a fever? No   During the last 2 months, have you had sexual contact with a specific person for the first time? No   Provide any additional information you feel is important.    Please attach any relevant images or files    Are you able to take your vital signs? Yes   Systolic Blood  Pressure:    Diastolic Blood Pressure:    Weight: 130   Height: 60   Pulse:    Temperature: 97.8   Respiration rate:    Pulse Oxygen:           Active Problem List with Overview Notes    Diagnosis Date Noted    Gastroesophageal reflux disease 06/19/2024    Insomnia secondary to anxiety 06/19/2024    Chronic lymphocytic leukemia (CLL), clinical stage A 12/22/2023    Uncontrolled type 2 diabetes mellitus with hyperglycemia 03/16/2021    Dyslipidemia associated with type 2 diabetes mellitus 03/16/2021      Recent Labs Obtained:  Lab Results   Component Value Date    WBC 14.49 (H) 06/06/2024    HGB 14.3 06/06/2024    HCT 44.8 06/06/2024    MCV 98 06/06/2024     06/06/2024     06/06/2024    K 4.3 06/06/2024     (H) 06/06/2024    CREATININE 1.1 06/06/2024    EGFRNORACEVR 53 (A) 06/06/2024    HGBA1C 7.5 (H) 06/06/2024    TSH 2.361 06/06/2024      Review of patient's allergies indicates:   Allergen Reactions    Codeine Nausea And Vomiting    Pcn [penicillins] Rash    Sulfa (sulfonamide antibiotics) Rash       Encounter Diagnosis   Name Primary?    Acute vaginitis Yes        No orders of the defined types were placed in this encounter.     Medications Ordered This Encounter   Medications    fluconazole (DIFLUCAN) 150 MG Tab     Sig: Take 1 tablet (150 mg total) by mouth once. for 1 dose     Dispense:  1 tablet     Refill:  1    metroNIDAZOLE (METROGEL VAGINAL) 0.75 % (37.5mg/5 gram) vaginal gel     Sig: Place 1 applicator vaginally once daily. for 5 days     Dispense:  5 g     Refill:  0        E-Visit Time Tracking:    Day 1 Time (in minutes): 11    Total Time (in minutes): 11      274}

## 2024-08-05 ENCOUNTER — E-VISIT (OUTPATIENT)
Dept: INTERNAL MEDICINE | Facility: CLINIC | Age: 74
End: 2024-08-05
Payer: MEDICARE

## 2024-08-05 DIAGNOSIS — R30.0 DYSURIA: Primary | ICD-10-CM

## 2024-08-05 DIAGNOSIS — N30.00 ACUTE CYSTITIS WITHOUT HEMATURIA: ICD-10-CM

## 2024-08-05 DIAGNOSIS — N89.8 VAGINAL DISCHARGE: ICD-10-CM

## 2024-08-06 ENCOUNTER — LAB VISIT (OUTPATIENT)
Dept: LAB | Facility: HOSPITAL | Age: 74
End: 2024-08-06
Attending: FAMILY MEDICINE
Payer: MEDICARE

## 2024-08-06 ENCOUNTER — PATIENT MESSAGE (OUTPATIENT)
Dept: INTERNAL MEDICINE | Facility: CLINIC | Age: 74
End: 2024-08-06
Payer: MEDICARE

## 2024-08-06 DIAGNOSIS — N89.8 VAGINAL DISCHARGE: ICD-10-CM

## 2024-08-06 DIAGNOSIS — R30.0 DYSURIA: ICD-10-CM

## 2024-08-06 LAB
ALBUMIN SERPL BCP-MCNC: 3.9 G/DL (ref 3.5–5.2)
ALP SERPL-CCNC: 76 U/L (ref 55–135)
ALT SERPL W/O P-5'-P-CCNC: 15 U/L (ref 10–44)
ANION GAP SERPL CALC-SCNC: 8 MMOL/L (ref 8–16)
AST SERPL-CCNC: 14 U/L (ref 10–40)
BASOPHILS # BLD AUTO: 0.08 K/UL (ref 0–0.2)
BASOPHILS NFR BLD: 0.4 % (ref 0–1.9)
BILIRUB SERPL-MCNC: 0.4 MG/DL (ref 0.1–1)
BUN SERPL-MCNC: 14 MG/DL (ref 8–23)
CALCIUM SERPL-MCNC: 9.5 MG/DL (ref 8.7–10.5)
CHLORIDE SERPL-SCNC: 106 MMOL/L (ref 95–110)
CO2 SERPL-SCNC: 26 MMOL/L (ref 23–29)
CREAT SERPL-MCNC: 0.9 MG/DL (ref 0.5–1.4)
DIFFERENTIAL METHOD BLD: ABNORMAL
EOSINOPHIL # BLD AUTO: 0.2 K/UL (ref 0–0.5)
EOSINOPHIL NFR BLD: 1 % (ref 0–8)
ERYTHROCYTE [DISTWIDTH] IN BLOOD BY AUTOMATED COUNT: 13 % (ref 11.5–14.5)
EST. GFR  (NO RACE VARIABLE): >60 ML/MIN/1.73 M^2
GLUCOSE SERPL-MCNC: 248 MG/DL (ref 70–110)
HCT VFR BLD AUTO: 41.6 % (ref 37–48.5)
HGB BLD-MCNC: 13.5 G/DL (ref 12–16)
IMM GRANULOCYTES # BLD AUTO: 0.09 K/UL (ref 0–0.04)
IMM GRANULOCYTES NFR BLD AUTO: 0.5 % (ref 0–0.5)
LYMPHOCYTES # BLD AUTO: 6.4 K/UL (ref 1–4.8)
LYMPHOCYTES NFR BLD: 34 % (ref 18–48)
MCH RBC QN AUTO: 31.8 PG (ref 27–31)
MCHC RBC AUTO-ENTMCNC: 32.5 G/DL (ref 32–36)
MCV RBC AUTO: 98 FL (ref 82–98)
MONOCYTES # BLD AUTO: 2 K/UL (ref 0.3–1)
MONOCYTES NFR BLD: 10.6 % (ref 4–15)
NEUTROPHILS # BLD AUTO: 10.1 K/UL (ref 1.8–7.7)
NEUTROPHILS NFR BLD: 53.5 % (ref 38–73)
NRBC BLD-RTO: 0 /100 WBC
PLATELET # BLD AUTO: 276 K/UL (ref 150–450)
PLATELET BLD QL SMEAR: ABNORMAL
PMV BLD AUTO: 11.3 FL (ref 9.2–12.9)
POTASSIUM SERPL-SCNC: 3.9 MMOL/L (ref 3.5–5.1)
PROT SERPL-MCNC: 6.6 G/DL (ref 6–8.4)
RBC # BLD AUTO: 4.24 M/UL (ref 4–5.4)
SODIUM SERPL-SCNC: 140 MMOL/L (ref 136–145)
WBC # BLD AUTO: 18.91 K/UL (ref 3.9–12.7)

## 2024-08-06 PROCEDURE — 80053 COMPREHEN METABOLIC PANEL: CPT | Performed by: FAMILY MEDICINE

## 2024-08-06 PROCEDURE — 85025 COMPLETE CBC W/AUTO DIFF WBC: CPT | Performed by: FAMILY MEDICINE

## 2024-08-06 PROCEDURE — 36415 COLL VENOUS BLD VENIPUNCTURE: CPT | Mod: PO | Performed by: FAMILY MEDICINE

## 2024-08-07 RX ORDER — NITROFURANTOIN 25; 75 MG/1; MG/1
100 CAPSULE ORAL 2 TIMES DAILY
Qty: 10 CAPSULE | Refills: 0 | Status: SHIPPED | OUTPATIENT
Start: 2024-08-07 | End: 2024-08-12

## 2024-08-09 ENCOUNTER — PATIENT MESSAGE (OUTPATIENT)
Dept: INTERNAL MEDICINE | Facility: CLINIC | Age: 74
End: 2024-08-09
Payer: MEDICARE

## 2024-08-15 ENCOUNTER — PATIENT MESSAGE (OUTPATIENT)
Dept: INTERNAL MEDICINE | Facility: CLINIC | Age: 74
End: 2024-08-15
Payer: MEDICARE

## 2024-08-15 DIAGNOSIS — T36.95XA ANTIBIOTIC-INDUCED YEAST INFECTION: Primary | ICD-10-CM

## 2024-08-15 DIAGNOSIS — N89.8 VAGINAL DISCHARGE: ICD-10-CM

## 2024-08-15 DIAGNOSIS — B37.9 ANTIBIOTIC-INDUCED YEAST INFECTION: Primary | ICD-10-CM

## 2024-08-16 RX ORDER — FLUCONAZOLE 150 MG/1
150 TABLET ORAL DAILY
Qty: 1 TABLET | Refills: 0 | Status: SHIPPED | OUTPATIENT
Start: 2024-08-16 | End: 2024-08-17

## 2024-08-29 ENCOUNTER — PATIENT MESSAGE (OUTPATIENT)
Dept: INTERNAL MEDICINE | Facility: CLINIC | Age: 74
End: 2024-08-29
Payer: MEDICARE

## 2024-08-29 DIAGNOSIS — E11.65 UNCONTROLLED TYPE 2 DIABETES MELLITUS WITH HYPERGLYCEMIA: ICD-10-CM

## 2024-08-29 DIAGNOSIS — K21.9 GASTROESOPHAGEAL REFLUX DISEASE, UNSPECIFIED WHETHER ESOPHAGITIS PRESENT: ICD-10-CM

## 2024-08-29 RX ORDER — EMPAGLIFLOZIN 25 MG/1
TABLET, FILM COATED ORAL
Qty: 90 TABLET | Refills: 1 | OUTPATIENT
Start: 2024-08-29

## 2024-08-29 NOTE — TELEPHONE ENCOUNTER
No care due was identified.  [facility] Embedded Care Due Messages. Reference number: [id number].   8/29/2024 2:11:14 PM CDT

## 2024-08-29 NOTE — TELEPHONE ENCOUNTER
No care due was identified.  [facility] Embedded Care Due Messages. Reference number: [id number].   8/29/2024 4:18:29 PM CDT

## 2024-08-29 NOTE — TELEPHONE ENCOUNTER
No care due was identified.  [facility] Embedded Care Due Messages. Reference number: [id number].   8/29/2024 2:30:17 PM CDT

## 2024-08-29 NOTE — TELEPHONE ENCOUNTER
----- Message from Rafi Gar sent at 8/29/2024  2:34 PM CDT -----  Contact: [phone]  Type:  RX Refill Request    Who Called: pharmacy  Refill or New Rx:refill  RX Name and Strength:empagliflozin (JARDIANCE) 25 mg tablet  How is the patient currently taking it? (ex. 1XDay):  Is this a 30 day or 90 day RX:  Preferred Pharmacy with phone number:[phone] Fax: [phone]    Local or Mail Order:local  Ordering Provider:ROBERTO  Would the patient rather a call back or a response via [facility]? CALL BACK  Best Call Back Number: [phone]  Additional Information: MRN [id number]        [id number] - [facility] [address] AT [facility] [address]  [address] [place] [id number]  Phone: [phone] Fax: [phone]

## 2024-08-29 NOTE — TELEPHONE ENCOUNTER
No care due was identified.  [facility] Embedded Care Due Messages. Reference number: [id number].   8/29/2024 1:12:21 PM CDT

## 2024-08-30 RX ORDER — EMPAGLIFLOZIN 25 MG/1
TABLET, FILM COATED ORAL
Qty: 90 TABLET | Refills: 1 | OUTPATIENT
Start: 2024-08-30

## 2024-08-30 RX ORDER — OMEPRAZOLE 20 MG/1
20 CAPSULE, DELAYED RELEASE ORAL DAILY
Qty: 180 CAPSULE | Refills: 1 | Status: SHIPPED | OUTPATIENT
Start: 2024-08-30

## 2024-08-30 NOTE — TELEPHONE ENCOUNTER
Refill Routing Note   Medication(s) are not appropriate for processing by [facility] for the following reason(s):        New or recently adjusted medication    [facility] action(s):  Defer               Appointments  past 12m or future 3m with PCP    Date Provider   Last Visit   8/5/2024 Samantha Millan MD   Next Visit   8/29/2024 Samantha Millan MD   ED visits in past 90 days: 0        Note composed:3:21 AM 08/30/2024

## 2024-08-30 NOTE — TELEPHONE ENCOUNTER
Refill Decision Note  Quick DC. Duplicate Request-  med pended in previous encounter, awaiting assessment     Josiah Perkins  is requesting a refill authorization.  Brief Assessment and Rationale for Refill:  Quick Discontinue     Medication Therapy Plan:  2nd duplicate encounter    Medication Reconciliation Completed: No   Comments:     No Care Gaps recommended.     Note composed:7:25 AM 08/30/2024

## 2024-08-30 NOTE — TELEPHONE ENCOUNTER
Refill Decision Note  Quick DC. Duplicate Request-  med pended in previous encounter, awaiting assessment     Josiah Perkins  is requesting a refill authorization.  Brief Assessment and Rationale for Refill:  Quick Discontinue     Medication Therapy Plan:  3rd duplicate encounter created, currently awaiting provider review    Medication Reconciliation Completed: No   Comments:           Note composed:7:25 AM 08/30/2024

## 2024-09-04 ENCOUNTER — OFFICE VISIT (OUTPATIENT)
Dept: OBSTETRICS AND GYNECOLOGY | Facility: CLINIC | Age: 74
End: 2024-09-04
Payer: MEDICARE

## 2024-09-04 VITALS
SYSTOLIC BLOOD PRESSURE: 102 MMHG | DIASTOLIC BLOOD PRESSURE: 62 MMHG | WEIGHT: 127.63 LBS | HEIGHT: 60 IN | BODY MASS INDEX: 25.06 KG/M2

## 2024-09-04 DIAGNOSIS — N89.8 VAGINAL DISCHARGE: Primary | ICD-10-CM

## 2024-09-04 DIAGNOSIS — R30.0 DYSURIA: ICD-10-CM

## 2024-09-04 DIAGNOSIS — N95.8 GENITOURINARY SYNDROME OF MENOPAUSE: ICD-10-CM

## 2024-09-04 PROBLEM — N95.2 VAGINAL ATROPHY: Status: ACTIVE | Noted: 2024-09-04

## 2024-09-04 LAB
BACTERIA #/AREA URNS HPF: ABNORMAL /HPF
BILIRUB UR QL STRIP: NEGATIVE
CLARITY UR: CLEAR
COLOR UR: YELLOW
GLUCOSE UR QL STRIP: ABNORMAL
HGB UR QL STRIP: ABNORMAL
KETONES UR QL STRIP: NEGATIVE
LEUKOCYTE ESTERASE UR QL STRIP: NEGATIVE
MICROSCOPIC COMMENT: ABNORMAL
NITRITE UR QL STRIP: NEGATIVE
PH UR STRIP: 6 [PH] (ref 5–8)
PROT UR QL STRIP: NEGATIVE
RBC #/AREA URNS HPF: 10 /HPF (ref 0–4)
SP GR UR STRIP: 1.02 (ref 1–1.03)
SQUAMOUS #/AREA URNS HPF: 1 /HPF
URN SPEC COLLECT METH UR: ABNORMAL
WBC #/AREA URNS HPF: 1 /HPF (ref 0–5)
YEAST URNS QL MICRO: ABNORMAL

## 2024-09-04 PROCEDURE — 99999 PR PBB SHADOW E&M-EST. PATIENT-LVL III: CPT | Mod: PBBFAC,,, | Performed by: STUDENT IN AN ORGANIZED HEALTH CARE EDUCATION/TRAINING PROGRAM

## 2024-09-04 PROCEDURE — 87491 CHLMYD TRACH DNA AMP PROBE: CPT | Performed by: STUDENT IN AN ORGANIZED HEALTH CARE EDUCATION/TRAINING PROGRAM

## 2024-09-04 PROCEDURE — 81000 URINALYSIS NONAUTO W/SCOPE: CPT | Performed by: STUDENT IN AN ORGANIZED HEALTH CARE EDUCATION/TRAINING PROGRAM

## 2024-09-04 NOTE — ASSESSMENT & PLAN NOTE
We discussed that with her recent UTIs and vaginitis we will repeat tests today and collect for testing for other infections. G/C collected, wet prep performed, UA with reflex culture.

## 2024-09-04 NOTE — PROGRESS NOTES
Subjective     Patient ID: Josiah Perkins is a 74 y.o. female.    Chief Complaint:  Vaginitis      History of Present Illness  Vaginal Discharge  The patient's primary symptoms include vaginal discharge. This is a chronic problem. The problem occurs intermittently. The problem has been gradually worsening. The pain is moderate. The problem affects both sides. She is not pregnant. Associated symptoms include abdominal pain, frequency, hematuria and urgency. The vaginal bleeding is spotting. She has not been passing clots. She has not been passing tissue. Nothing aggravates the symptoms. She has tried anti-fungal cream, douching and warm bath for the symptoms. She is not sexually active. No, her partner does not have an STD. She is postmenopausal. Her past medical history is significant for an abdominal surgery, a  section, endometriosis, a gynecological surgery and metrorrhagia.    Patient presents for vaginal discharge check. Sexual history reviewed with the patient. STD exposure: denies knowledge of risky exposure and not sexually active at this time.  Previous history of STD:yes  Current symptoms include vaginal discharge: white and thin, vaginal irritation: moderate, dysuria: moderate, pelvic pain: suprapubic.       She complains of urinary frequency, urgency and dysuria, without flank pain, fever, chills, or abnormal vaginal discharge or bleeding. She also states that when she has had infections recently she also experiences bladder spasms that are severe. She has not been sexually active in over 20 years.     Previously she was prescribed diflucan and flagyl on 24, she was also treated for UTI on  with macrobid and then on 24 she was prescribed diflucan. Her symptoms have not resolved so she was referred to GYN.       GYN & OB History  No LMP recorded. Patient has had a hysterectomy.   Date of Last Pap: No result found    OB History    Para Term  AB Living   2 2 2         SAB IAB  Ectopic Multiple Live Births                  # Outcome Date GA Lbr Robby/2nd Weight Sex Type Anes PTL Lv   2 Term            1 Term                Review of Systems  Review of Systems   Gastrointestinal:  Positive for abdominal pain.   Genitourinary:  Positive for frequency, hematuria, urgency and vaginal discharge.          Objective   Physical Exam:   Constitutional: She is oriented to person, place, and time. She appears well-developed and well-nourished. No distress.    HENT:   Head: Normocephalic and atraumatic.    Eyes: Conjunctivae and EOM are normal. Right eye exhibits no discharge. Left eye exhibits no discharge.     Cardiovascular:  Normal rate.             Pulmonary/Chest: Effort normal. No accessory muscle usage. No tachypnea. No respiratory distress.        Abdominal: Soft. She exhibits no distension. There is abdominal tenderness (suprapubic) in the suprapubic area.     Genitourinary:    Urethra and uterus normal.      Pelvic exam was performed with patient supine.   The external female genitalia was normal.     Labial bartholins normal.There is no rash, tenderness, lesion or injury on the right labia. There is no rash, tenderness, lesion or injury on the left labia. Right adnexum displays no mass, no tenderness and no fullness. Left adnexum displays no mass, no tenderness and no fullness. There is vaginal discharge (thin, watery, white) in the vagina. No erythema, tenderness or bleeding in the vagina.    No foreign body in the vagina.   Uterus is not deviated, not enlarged and not tender. Normal urethral meatus.Bladder findings: bladder tenderness     Genitourinary Comments: Exam limited by patient discomfort, unable to insert speculum further than 2cm into introitus, bimanual performed with pinky             Musculoskeletal: Normal range of motion and moves all extremeties. No tenderness or edema.       Neurological: She is alert and oriented to person, place, and time.    Skin: Skin is warm and dry.     Psychiatric: She has a normal mood and affect. Her behavior is normal. Thought content normal.      Wet prep negative for yeast, clue cells, and trichomoniasis        Assessment and Plan     1. Vaginal discharge    2. Genitourinary syndrome of menopause    3. Dysuria             Plan:     1. Vaginal discharge  Assessment & Plan:  We discussed that with her recent UTIs and vaginitis we will repeat tests today and collect for testing for other infections. G/C collected, wet prep performed, UA with reflex culture.         Orders:  -     Ambulatory referral/consult to Obstetrics / Gynecology  -     POCT Wet Prep  -     C. trachomatis/N. gonorrhoeae by AMP DNA  -     Vaginosis Screen by DNA Probe; Future; Expected date: 09/04/2024    2. Genitourinary syndrome of menopause  Assessment & Plan:  Genitourinary syndrome of menopause (GSM):   We discussed that GSM or vaginal atrophy occurs due to the lack of estrogen in the genitourinary tract.  I recommend low-dose vaginal estrogen topical use 2 times weekly for treatment of atrophy to help rejuvenate the vaginal tissues.We also discussed Replens vaginal moisturizer. At this time will not order Premarin. Will await testing for vaginitis. We discussed that the dose when applied vaginally at this frequency is very low and, thus, will have low systemic absorption.  It is rare to have systemic estrogen side effects.         3. Dysuria  Assessment & Plan:  UA with reflex culture    Orders:  -     Urinalysis, Reflex to Urine Culture Urine, Clean Catch

## 2024-09-04 NOTE — ASSESSMENT & PLAN NOTE
Genitourinary syndrome of menopause (GSM):   We discussed that GSM or vaginal atrophy occurs due to the lack of estrogen in the genitourinary tract.  I recommend low-dose vaginal estrogen topical use 2 times weekly for treatment of atrophy to help rejuvenate the vaginal tissues.We also discussed Replens vaginal moisturizer. At this time will not order Premarin. Will await testing for vaginitis. We discussed that the dose when applied vaginally at this frequency is very low and, thus, will have low systemic absorption.  It is rare to have systemic estrogen side effects.

## 2024-09-06 DIAGNOSIS — N95.8 GENITOURINARY SYNDROME OF MENOPAUSE: Primary | ICD-10-CM

## 2024-09-06 RX ORDER — CONJUGATED ESTROGENS 0.62 MG/G
CREAM VAGINAL
Qty: 30 G | Refills: 10 | Status: SHIPPED | OUTPATIENT
Start: 2024-09-06

## 2024-09-07 LAB
C TRACH DNA SPEC QL NAA+PROBE: NOT DETECTED
N GONORRHOEA DNA SPEC QL NAA+PROBE: NOT DETECTED

## 2024-09-14 DIAGNOSIS — F51.05 INSOMNIA SECONDARY TO ANXIETY: ICD-10-CM

## 2024-09-14 DIAGNOSIS — F41.9 INSOMNIA SECONDARY TO ANXIETY: ICD-10-CM

## 2024-09-14 DIAGNOSIS — E11.69 DYSLIPIDEMIA ASSOCIATED WITH TYPE 2 DIABETES MELLITUS: ICD-10-CM

## 2024-09-14 DIAGNOSIS — E78.5 DYSLIPIDEMIA ASSOCIATED WITH TYPE 2 DIABETES MELLITUS: ICD-10-CM

## 2024-09-14 NOTE — TELEPHONE ENCOUNTER
Care Due:                  Date            Visit Type   Department     Provider  --------------------------------------------------------------------------------                                             [facility]  Samantha Maintamara  Last Visit: 06-      [facility]       Monty  Next Visit: None Scheduled  None         None Found                                                            Last  Test          Frequency    Reason                     Performed    Due Date  --------------------------------------------------------------------------------    HBA1C.......  6 months...  empagliflozin............  06- 12-    [facility] Embedded Care Due Messages. Reference number: [id number].   9/14/2024 12:56:33 PM CDT

## 2024-09-16 ENCOUNTER — E-VISIT (OUTPATIENT)
Dept: INTERNAL MEDICINE | Facility: CLINIC | Age: 74
End: 2024-09-16
Payer: MEDICARE

## 2024-09-16 ENCOUNTER — LAB VISIT (OUTPATIENT)
Dept: LAB | Facility: HOSPITAL | Age: 74
End: 2024-09-16
Attending: FAMILY MEDICINE
Payer: MEDICARE

## 2024-09-16 DIAGNOSIS — N89.8 VAGINAL DISCHARGE: Primary | ICD-10-CM

## 2024-09-16 DIAGNOSIS — N89.8 VAGINAL DISCHARGE: ICD-10-CM

## 2024-09-16 PROCEDURE — 81001 URINALYSIS AUTO W/SCOPE: CPT | Performed by: FAMILY MEDICINE

## 2024-09-16 RX ORDER — ROSUVASTATIN CALCIUM 20 MG/1
20 TABLET, COATED ORAL DAILY
Qty: 90 TABLET | Refills: 3 | Status: SHIPPED | OUTPATIENT
Start: 2024-09-16 | End: 2025-09-16

## 2024-09-16 RX ORDER — TRAZODONE HYDROCHLORIDE 100 MG/1
100 TABLET ORAL NIGHTLY PRN
Qty: 90 TABLET | Refills: 1 | Status: SHIPPED | OUTPATIENT
Start: 2024-09-16

## 2024-09-16 NOTE — PROGRESS NOTES
Patient ID: Josiah Perkins is a 74 y.o. female.    Chief Complaint: General Illness (Entered automatically based on patient selection in [employer].)    The patient initiated a request through [employer] on 9/16/2024 for evaluation and management with a chief complaint of General Illness (Entered automatically based on patient selection in [employer].)     I evaluated the questionnaire submission on 9/16/2024.    [facility]    9/16/2024 12:44 PM CDT - Filed by Patient   What do you need help with? Bacterial Vaginosis   Do you agree to participate in an E-Visit? Yes   If you have any of the following symptoms,  please do not complete an E-Visit,  schedule an appointment with your provider: I acknowledge   What is the main issue you would like addressed today? Returned symptoms from Ecoli infection   Which of the following vaginal concerns do you have? Discharge;  Itching   Do you have vaginal discharge? White/milky discharge   Do you have pain while passing urine? No   Do you have any of the following symptoms? Belly pain;  Frequent urination    Have you taken antibiotics in the last two weeks? No    Do you use any of the following? No   Which of the following applies to your menstrual period? Have not had for a while   Which of the following applies to your menstrual cycle? No bleeding   Do you have spotting between periods? No   Do you have pain with your period? No   Have you had similar symptoms in the past? More than once   When you had similar symptoms in the past, did any of the following work? Pills for urine infection   Have you had a temperature of 100.4 or higher? No   Provide any additional information you feel is important.    Please attach any relevant images or files    Are you able to take your vital signs? No         Encounter Diagnosis   Name Primary?    Vaginal discharge Yes        Orders Placed This Encounter   Procedures    Urinalysis, Reflex to Urine Culture Urine, Clean Catch      Standing Status:   Future     Standing Expiration Date:   11/15/2025     Order Specific Question:   Preferred Collection Type     Answer:   Urine, Clean Catch     Order Specific Question:   Specimen Source     Answer:   Urine            No follow-ups on file.      E-Visit Time Trackin minutes

## 2024-09-17 LAB
BACTERIA #/AREA URNS AUTO: NORMAL /HPF
BILIRUB UR QL STRIP: NEGATIVE
CLARITY UR REFRACT.AUTO: CLEAR
COLOR UR AUTO: YELLOW
GLUCOSE UR QL STRIP: ABNORMAL
HGB UR QL STRIP: NEGATIVE
KETONES UR QL STRIP: NEGATIVE
LEUKOCYTE ESTERASE UR QL STRIP: NEGATIVE
MICROSCOPIC COMMENT: NORMAL
NITRITE UR QL STRIP: NEGATIVE
PH UR STRIP: 5 [PH] (ref 5–8)
PROT UR QL STRIP: NEGATIVE
RBC #/AREA URNS AUTO: 1 /HPF (ref 0–4)
SP GR UR STRIP: 1.02 (ref 1–1.03)
SQUAMOUS #/AREA URNS AUTO: 4 /HPF
URN SPEC COLLECT METH UR: ABNORMAL
WBC #/AREA URNS AUTO: 1 /HPF (ref 0–5)
YEAST UR QL AUTO: NORMAL

## 2024-09-23 ENCOUNTER — PATIENT MESSAGE (OUTPATIENT)
Dept: INTERNAL MEDICINE | Facility: CLINIC | Age: 74
End: 2024-09-23
Payer: MEDICARE

## 2024-09-23 ENCOUNTER — OFFICE VISIT (OUTPATIENT)
Dept: DERMATOLOGY | Facility: CLINIC | Age: 74
End: 2024-09-23
Payer: MEDICARE

## 2024-09-23 DIAGNOSIS — L70.0 CYSTIC ACNE VULGARIS: Primary | ICD-10-CM

## 2024-09-23 DIAGNOSIS — E11.65 UNCONTROLLED TYPE 2 DIABETES MELLITUS WITH HYPERGLYCEMIA: ICD-10-CM

## 2024-09-23 DIAGNOSIS — H01.131 ECZEMATOUS DERMATITIS OF UPPER EYELIDS OF BOTH EYES: ICD-10-CM

## 2024-09-23 DIAGNOSIS — H01.134 ECZEMATOUS DERMATITIS OF UPPER EYELIDS OF BOTH EYES: ICD-10-CM

## 2024-09-23 DIAGNOSIS — E11.9 CONTROLLED TYPE 2 DIABETES MELLITUS WITHOUT COMPLICATION, WITHOUT LONG-TERM CURRENT USE OF INSULIN: Primary | ICD-10-CM

## 2024-09-23 DIAGNOSIS — L73.0 ACNE SCARRING: ICD-10-CM

## 2024-09-23 PROCEDURE — 1160F RVW MEDS BY RX/DR IN RCRD: CPT | Mod: CPTII,S$GLB,, | Performed by: PHYSICIAN ASSISTANT

## 2024-09-23 PROCEDURE — 3288F FALL RISK ASSESSMENT DOCD: CPT | Mod: CPTII,S$GLB,, | Performed by: PHYSICIAN ASSISTANT

## 2024-09-23 PROCEDURE — G2211 COMPLEX E/M VISIT ADD ON: HCPCS | Mod: S$GLB,,, | Performed by: PHYSICIAN ASSISTANT

## 2024-09-23 PROCEDURE — 99214 OFFICE O/P EST MOD 30 MIN: CPT | Mod: S$GLB,,, | Performed by: PHYSICIAN ASSISTANT

## 2024-09-23 PROCEDURE — 99999 PR PBB SHADOW E&M-EST. PATIENT-LVL III: CPT | Mod: PBBFAC,,, | Performed by: PHYSICIAN ASSISTANT

## 2024-09-23 PROCEDURE — 1126F AMNT PAIN NOTED NONE PRSNT: CPT | Mod: CPTII,S$GLB,, | Performed by: PHYSICIAN ASSISTANT

## 2024-09-23 PROCEDURE — 1101F PT FALLS ASSESS-DOCD LE1/YR: CPT | Mod: CPTII,S$GLB,, | Performed by: PHYSICIAN ASSISTANT

## 2024-09-23 PROCEDURE — 1159F MED LIST DOCD IN RCRD: CPT | Mod: CPTII,S$GLB,, | Performed by: PHYSICIAN ASSISTANT

## 2024-09-23 PROCEDURE — 3051F HG A1C>EQUAL 7.0%<8.0%: CPT | Mod: CPTII,S$GLB,, | Performed by: PHYSICIAN ASSISTANT

## 2024-09-23 RX ORDER — TRETINOIN 0.25 MG/G
CREAM TOPICAL
Qty: 20 G | Refills: 4 | Status: SHIPPED | OUTPATIENT
Start: 2024-09-23 | End: 2025-09-23

## 2024-09-23 RX ORDER — CLINDAMYCIN PHOSPHATE 10 MG/G
GEL TOPICAL
Qty: 30 G | Refills: 1 | Status: SHIPPED | OUTPATIENT
Start: 2024-09-23

## 2024-09-23 NOTE — PROGRESS NOTES
Subjective:      Patient ID:  Josiah Perkins is a 74 y.o. female who presents for   Chief Complaint   Patient presents with    Rash     Follow up      Hx of eczematous dermatitis of eyelids, last seen 5/23/24 and s/p trial of elidel cream. Advised to d/c TAC 0.025% cream at last visit. Here for f/u. Still w/intermittent flares (~ every 6 weeks), but improves with elidel cream.  Unsure of known trigger.  Denies new skin care or cosmetics.  Endorses intermittent manicures and various hand creams w/fragrance.    C/o acne of face. Using Cera Ve wash  and acne control sal acid gel products. +Cysts, pimples, blackheads. Denies sensitive skin.  +combination skin.             Review of Systems   Constitutional:  Negative for fever and chills.   Gastrointestinal:  Negative for nausea and vomiting.   Skin:  Positive for itching and activity-related sunscreen use. Negative for rash, dry skin, sun sensitivity, daily sunscreen use, recent sunburn and dry lips.   Hematologic/Lymphatic: Does not bruise/bleed easily.       Objective:   Physical Exam   Constitutional: She appears well-developed and well-nourished. No distress.   Neurological: She is alert and oriented to person, place, and time. She is not disoriented.   Psychiatric: She has a normal mood and affect.   Skin:   Areas Examined (abnormalities noted in diagram):   Head / Face Inspection Performed  Neck Inspection Performed  Chest / Axilla Inspection Performed  Back Inspection Performed  RUE Inspected  LUE Inspection Performed                 Diagram Legend     Erythematous scaling macule/papule c/w actinic keratosis       Vascular papule c/w angioma      Pigmented verrucoid papule/plaque c/w seborrheic keratosis      Yellow umbilicated papule c/w sebaceous hyperplasia      Irregularly shaped tan macule c/w lentigo     1-2 mm smooth white papules consistent with Milia      Movable subcutaneous cyst with punctum c/w epidermal inclusion cyst      Subcutaneous movable cyst  c/w pilar cyst      Firm pink to brown papule c/w dermatofibroma      Pedunculated fleshy papule(s) c/w skin tag(s)      Evenly pigmented macule c/w junctional nevus     Mildly variegated pigmented, slightly irregular-bordered macule c/w mildly atypical nevus      Flesh colored to evenly pigmented papule c/w intradermal nevus       Pink pearly papule/plaque c/w basal cell carcinoma      Erythematous hyperkeratotic cursted plaque c/w SCC      Surgical scar with no sign of skin cancer recurrence      Open and closed comedones      Inflammatory papules and pustules      Verrucoid papule consistent consistent with wart     Erythematous eczematous patches and plaques     Dystrophic onycholytic nail with subungual debris c/w onychomycosis     Umbilicated papule    Erythematous-base heme-crusted tan verrucoid plaque consistent with inflamed seborrheic keratosis     Erythematous Silvery Scaling Plaque c/w Psoriasis     See annotation      Assessment / Plan:        Cystic acne vulgaris  Acne scarring  -     tretinoin (RETIN-A) 0.025 % cream; Apply pea-sized amount to entire face at bedtime.  If dryness, use every third night and increase as tolerated to every night.  Dispense: 20 g; Refill: 4  -     clindamycin phosphate 1% (CLINDAGEL) 1 % gel; AAA qd to bid prn acne. Decrease frequency if any dryness.  Dispense: 30 g; Refill: 1  Trial of above. Warned of dryness.     Eczematous dermatitis of upper eyelids of both eyes  Continue elidel  prn flares. Consider patch testing in future. Discussed journaling flares and potential for nail cosmetics or hand creams as possible trigger.            No follow-ups on file.

## 2024-09-23 NOTE — TELEPHONE ENCOUNTER
No care due was identified.  [facility] Embedded Care Due Messages. Reference number: [id number].   9/23/2024 10:59:56 AM CDT

## 2024-09-25 DIAGNOSIS — Z00.00 ENCOUNTER FOR MEDICARE ANNUAL WELLNESS EXAM: ICD-10-CM

## 2024-09-27 RX ORDER — METFORMIN HYDROCHLORIDE 500 MG/1
500 TABLET, EXTENDED RELEASE ORAL 2 TIMES DAILY WITH MEALS
Qty: 180 TABLET | Refills: 1 | Status: SHIPPED | OUTPATIENT
Start: 2024-09-27 | End: 2025-09-27

## 2024-10-03 ENCOUNTER — PATIENT MESSAGE (OUTPATIENT)
Dept: INTERNAL MEDICINE | Facility: CLINIC | Age: 74
End: 2024-10-03
Payer: MEDICARE

## 2024-10-07 ENCOUNTER — OFFICE VISIT (OUTPATIENT)
Dept: INTERNAL MEDICINE | Facility: CLINIC | Age: 74
End: 2024-10-07
Payer: MEDICARE

## 2024-10-07 ENCOUNTER — TELEPHONE (OUTPATIENT)
Dept: PHARMACY | Facility: CLINIC | Age: 74
End: 2024-10-07
Payer: MEDICARE

## 2024-10-07 VITALS
TEMPERATURE: 97 F | HEIGHT: 60 IN | BODY MASS INDEX: 25.5 KG/M2 | WEIGHT: 129.88 LBS | HEART RATE: 74 BPM | SYSTOLIC BLOOD PRESSURE: 104 MMHG | DIASTOLIC BLOOD PRESSURE: 60 MMHG | OXYGEN SATURATION: 98 %

## 2024-10-07 DIAGNOSIS — J30.9 ALLERGIC CONJUNCTIVITIS AND RHINITIS, BILATERAL: Primary | ICD-10-CM

## 2024-10-07 DIAGNOSIS — H10.13 ALLERGIC CONJUNCTIVITIS AND RHINITIS, BILATERAL: Primary | ICD-10-CM

## 2024-10-07 PROCEDURE — 99213 OFFICE O/P EST LOW 20 MIN: CPT | Mod: S$GLB,,, | Performed by: NURSE PRACTITIONER

## 2024-10-07 PROCEDURE — 3051F HG A1C>EQUAL 7.0%<8.0%: CPT | Mod: CPTII,S$GLB,, | Performed by: NURSE PRACTITIONER

## 2024-10-07 PROCEDURE — 99999 PR PBB SHADOW E&M-EST. PATIENT-LVL IV: CPT | Mod: PBBFAC,,, | Performed by: NURSE PRACTITIONER

## 2024-10-07 PROCEDURE — 1126F AMNT PAIN NOTED NONE PRSNT: CPT | Mod: CPTII,S$GLB,, | Performed by: NURSE PRACTITIONER

## 2024-10-07 PROCEDURE — 3078F DIAST BP <80 MM HG: CPT | Mod: CPTII,S$GLB,, | Performed by: NURSE PRACTITIONER

## 2024-10-07 PROCEDURE — 3008F BODY MASS INDEX DOCD: CPT | Mod: CPTII,S$GLB,, | Performed by: NURSE PRACTITIONER

## 2024-10-07 PROCEDURE — 3288F FALL RISK ASSESSMENT DOCD: CPT | Mod: CPTII,S$GLB,, | Performed by: NURSE PRACTITIONER

## 2024-10-07 PROCEDURE — 1101F PT FALLS ASSESS-DOCD LE1/YR: CPT | Mod: CPTII,S$GLB,, | Performed by: NURSE PRACTITIONER

## 2024-10-07 PROCEDURE — 3074F SYST BP LT 130 MM HG: CPT | Mod: CPTII,S$GLB,, | Performed by: NURSE PRACTITIONER

## 2024-10-07 PROCEDURE — 1159F MED LIST DOCD IN RCRD: CPT | Mod: CPTII,S$GLB,, | Performed by: NURSE PRACTITIONER

## 2024-10-07 RX ORDER — MONTELUKAST SODIUM 10 MG/1
10 TABLET ORAL NIGHTLY
Qty: 30 TABLET | Refills: 3 | Status: SHIPPED | OUTPATIENT
Start: 2024-10-07 | End: 2025-02-04

## 2024-10-07 RX ORDER — KETOTIFEN FUMARATE 0.35 MG/ML
1 SOLUTION/ DROPS OPHTHALMIC 2 TIMES DAILY
Qty: 10 ML | Refills: 0 | Status: SHIPPED | OUTPATIENT
Start: 2024-10-07 | End: 2025-10-07

## 2024-10-07 RX ORDER — AMLODIPINE BESYLATE 2.5 MG/1
2.5 TABLET ORAL
COMMUNITY
Start: 2024-08-24

## 2024-10-07 NOTE — PROGRESS NOTES
Subjective:       Patient ID: Josiah Perkins is a 74 y.o. female.    Chief Complaint: Nasal Congestion (X 2 weeks) and Otalgia (Left ear)    Otalgia   Associated symptoms include coughing and rhinorrhea. Pertinent negatives include no sore throat.           Past Medical History:   Diagnosis Date    CKD (chronic kidney disease) stage 3, GFR 30-59 ml/min     Diabetes mellitus, type 2     Hemorrhoids     Hyperlipidemia     Leukemia, unspecified, in remission      Past Surgical History:   Procedure Laterality Date    ANGIOGRAM, CORONARY, WITH LEFT HEART CATHETERIZATION N/A 2024    Procedure: Angiogram, Coronary, with Left Heart Cath;  Surgeon: Mahogany Ross MD;  Location: [facility] CATH LAB;  Service: Cardiology;  Laterality: N/A;    APPENDECTOMY      BREAST CYST ASPIRATION Right     2019    BREAST CYST EXCISION Right     CATARACT EXTRACTION       SECTION      x 2    COLONOSCOPY N/A 2024    Procedure: COLONOSCOPY;  Surgeon: Yane Yancey MD;  Location: [facility];  Service: Endoscopy;  Laterality: N/A;    ESOPHAGOGASTRODUODENOSCOPY N/A 2024    Procedure: EGD (ESOPHAGOGASTRODUODENOSCOPY);  Surgeon: Yane Yancey MD;  Location: [facility];  Service: Endoscopy;  Laterality: N/A;    HYSTERECTOMY      SINUS SURGERY      TONSILLECTOMY       Social History     Socioeconomic History    Marital status:     Number of children: 2   Occupational History    Occupation: Retired School councellor   Tobacco Use    Smoking status: Never    Smokeless tobacco: Never   Substance and Sexual Activity    Alcohol use: Yes     Comment: glass of wine every other day    Drug use: Never    Sexual activity: Not Currently   Other Topics Concern    Are you pregnant or think you may be? No    Breast-feeding No     Social Drivers of Health     Financial Resource Strain: Low Risk  (2024)    Overall Financial Resource Strain (CARDIA)     Difficulty of Paying Living Expenses: Not hard at all   Food Insecurity: No  Food Insecurity (5/24/2024)    Hunger Vital Sign     Worried About Running Out of Food in the Last Year: Never true     Ran Out of Food in the Last Year: Never true   Transportation Needs: No Transportation Needs (5/24/2024)    PRAPARE - Transportation     Lack of Transportation (Medical): No     Lack of Transportation (Non-Medical): No   Physical Activity: Insufficiently Active (11/15/2023)    Exercise Vital Sign     Days of Exercise per Week: 2 days     Minutes of Exercise per Session: 30 min   Stress: No Stress Concern Present (11/15/2023)    [employer] [place] [employer] - Occupational Stress Questionnaire     Feeling of Stress : Only a little   Housing Stability: Low Risk  (5/24/2024)    Housing Stability Vital Sign     Unable to Pay for Housing in the Last Year: No     Homeless in the Last Year: No     Review of patient's allergies indicates:   Allergen Reactions    Codeine Nausea And Vomiting    Pcn [penicillins] Rash    Sulfa (sulfonamide antibiotics) Rash     Current Outpatient Medications   Medication Sig    clindamycin phosphate 1% (CLINDAGEL) 1 % gel AAA qd to bid prn acne. Decrease frequency if any dryness.    conjugated estrogens (PREMARIN) vaginal cream 0.5 g once daily for 2 weeks, then reduce dose to 0.5 - 1 g  1-3 times/week.    empagliflozin (JARDIANCE) 25 mg tablet Take 1 tablet (25 mg total) by mouth once daily.    metFORMIN (GLUCOPHAGE-XR) 500 MG ER 24hr tablet Take 1 tablet (500 mg total) by mouth 2 (two) times daily with meals.    multivitamin capsule Take 1 capsule by mouth once daily.    omeprazole (PRILOSEC) 20 MG capsule Take 1 capsule (20 mg total) by mouth once daily.    pimecrolimus (ELIDEL) 1 % cream Apply topically 2 (two) times daily. PRN eyelid rash flares. Non-steroid.    rosuvastatin (CRESTOR) 20 MG tablet Take 1 tablet (20 mg total) by mouth once daily.    traZODone (DESYREL) 100 MG tablet Take 1 tablet (100 mg total) by mouth nightly as needed for Insomnia.     tretinoin (RETIN-A) 0.025 % cream Apply pea-sized amount to entire face at bedtime.  If dryness, use every third night and increase as tolerated to every night.    vitamin E 400 UNIT capsule Take 1 capsule (400 Units total) by mouth 2 (two) times daily.    amLODIPine (NORVASC) 2.5 MG tablet Take 2.5 mg by mouth. (Patient not taking: Reported on 10/7/2024)    ketotifen (ZADITOR) 0.025 % (0.035 %) ophthalmic solution Place 1 drop into both eyes 2 (two) times daily.    montelukast (SINGULAIR) 10 mg tablet Take 1 tablet (10 mg total) by mouth every evening.     No current facility-administered medications for this visit.           Review of Systems   Constitutional:  Negative for chills and fever.   HENT:  Positive for congestion, ear pain, postnasal drip, rhinorrhea and sneezing. Negative for sore throat.         Itchy eyes   Respiratory:  Positive for cough. Negative for shortness of breath.    Psychiatric/Behavioral:  Positive for sleep disturbance (secondary to nasal congestion).        Objective:      Physical Exam  Constitutional:       Appearance: She is well-developed.   HENT:      Head: Normocephalic and atraumatic.      Nose: Mucosal edema and rhinorrhea present.   Eyes:      General:         Right eye: No discharge.         Left eye: No discharge.      Conjunctiva/sclera: Conjunctivae normal.   Cardiovascular:      Rate and Rhythm: Normal rate and regular rhythm.      Heart sounds: No murmur heard.     No friction rub.   Pulmonary:      Effort: No respiratory distress.      Breath sounds: No wheezing.   Skin:     General: Skin is warm and dry.   Neurological:      Mental Status: She is alert and oriented to person, place, and time.         Assessment:     Vitals:    10/07/24 1347   BP: 104/60   Pulse: 74   Temp: 97.3 °F (36.3 °C)         1. Allergic conjunctivitis and rhinitis, bilateral        Plan:   Allergic conjunctivitis and rhinitis, bilateral    Other orders  -     montelukast (SINGULAIR) 10 mg tablet;  Take 1 tablet (10 mg total) by mouth every evening.  Dispense: 30 tablet; Refill: 3  -     ketotifen (ZADITOR) 0.025 % (0.035 %) ophthalmic solution; Place 1 drop into both eyes 2 (two) times daily.  Dispense: 10 mL; Refill: 0    Stop afrin  Meds as above  Humidifier/nasal saline spray encouraged  Notify if no improvement within a week

## 2024-10-07 NOTE — TELEPHONE ENCOUNTER
[facility]/Population Health Chart Review & Patient Outreach Details For Medication Adherence Project    Reason for Outreach Encounter: 3rd Party payor non-compliance report ([employer], [employer], [employer], etc)  2.  Patient Outreach Method: Reviewed patient chart   3.   Medication in question:   Diabetes Medications               empagliflozin (JARDIANCE) 25 mg tablet Take 1 tablet (25 mg total) by mouth once daily.    metFORMIN (GLUCOPHAGE-XR) 500 MG ER 24hr tablet Take 1 tablet (500 mg total) by mouth 2 (two) times daily with meals.              Hyperlipidemia Medications               rosuvastatin (CRESTOR) 20 MG tablet Take 1 tablet (20 mg total) by mouth once daily.                  Metformin ER   last filled  9/27/24 for 90 day supply  Rosuvastatin  last filled 9/16/24 for 90 day supply      4.  Reviewed and or Updates Made To: Patient Chart  5. Outreach Outcomes and/or actions taken: Patient filled medication and is on track to be adherent  Additional Notes:

## 2024-10-14 ENCOUNTER — LAB VISIT (OUTPATIENT)
Dept: LAB | Facility: HOSPITAL | Age: 74
End: 2024-10-14
Attending: INTERNAL MEDICINE
Payer: MEDICARE

## 2024-10-14 DIAGNOSIS — C91.10 CHRONIC LYMPHOCYTIC LEUKEMIA (CLL), CLINICAL STAGE A: ICD-10-CM

## 2024-10-14 LAB
BASOPHILS # BLD AUTO: ABNORMAL K/UL (ref 0–0.2)
BASOPHILS NFR BLD: 0 % (ref 0–1.9)
DACRYOCYTES BLD QL SMEAR: ABNORMAL
DIFFERENTIAL METHOD BLD: ABNORMAL
EOSINOPHIL # BLD AUTO: ABNORMAL K/UL (ref 0–0.5)
EOSINOPHIL NFR BLD: 0 % (ref 0–8)
ERYTHROCYTE [DISTWIDTH] IN BLOOD BY AUTOMATED COUNT: 12.8 % (ref 11.5–14.5)
HCT VFR BLD AUTO: 40 % (ref 37–48.5)
HGB BLD-MCNC: 13.2 G/DL (ref 12–16)
IMM GRANULOCYTES # BLD AUTO: ABNORMAL K/UL (ref 0–0.04)
IMM GRANULOCYTES NFR BLD AUTO: ABNORMAL % (ref 0–0.5)
LYMPHOCYTES # BLD AUTO: ABNORMAL K/UL (ref 1–4.8)
LYMPHOCYTES NFR BLD: 64 % (ref 18–48)
MCH RBC QN AUTO: 31.1 PG (ref 27–31)
MCHC RBC AUTO-ENTMCNC: 33 G/DL (ref 32–36)
MCV RBC AUTO: 94 FL (ref 82–98)
MONOCYTES # BLD AUTO: ABNORMAL K/UL (ref 0.3–1)
MONOCYTES NFR BLD: 14 % (ref 4–15)
NEUTROPHILS NFR BLD: 22 % (ref 38–73)
NRBC BLD-RTO: 0 /100 WBC
PLATELET # BLD AUTO: 223 K/UL (ref 150–450)
PLATELET BLD QL SMEAR: ABNORMAL
PMV BLD AUTO: 11.2 FL (ref 9.2–12.9)
POIKILOCYTOSIS BLD QL SMEAR: SLIGHT
RBC # BLD AUTO: 4.25 M/UL (ref 4–5.4)
SCHISTOCYTES BLD QL SMEAR: PRESENT
WBC # BLD AUTO: 15.79 K/UL (ref 3.9–12.7)

## 2024-10-14 PROCEDURE — 85007 BL SMEAR W/DIFF WBC COUNT: CPT | Performed by: INTERNAL MEDICINE

## 2024-10-14 PROCEDURE — 85027 COMPLETE CBC AUTOMATED: CPT | Performed by: INTERNAL MEDICINE

## 2024-10-14 PROCEDURE — 36415 COLL VENOUS BLD VENIPUNCTURE: CPT | Mod: PO | Performed by: INTERNAL MEDICINE

## 2024-10-17 ENCOUNTER — OFFICE VISIT (OUTPATIENT)
Dept: HEMATOLOGY/ONCOLOGY | Facility: CLINIC | Age: 74
End: 2024-10-17
Payer: MEDICARE

## 2024-10-17 ENCOUNTER — TELEPHONE (OUTPATIENT)
Dept: INTERNAL MEDICINE | Facility: CLINIC | Age: 74
End: 2024-10-17
Payer: MEDICARE

## 2024-10-17 VITALS
DIASTOLIC BLOOD PRESSURE: 75 MMHG | SYSTOLIC BLOOD PRESSURE: 118 MMHG | HEIGHT: 60 IN | BODY MASS INDEX: 25.36 KG/M2 | WEIGHT: 129.19 LBS | HEART RATE: 86 BPM

## 2024-10-17 DIAGNOSIS — C91.10 CLL (CHRONIC LYMPHOCYTIC LEUKEMIA): ICD-10-CM

## 2024-10-17 DIAGNOSIS — D52.0 DIETARY FOLATE DEFICIENCY ANEMIA: ICD-10-CM

## 2024-10-17 DIAGNOSIS — C91.10 CHRONIC LYMPHOCYTIC LEUKEMIA (CLL), CLINICAL STAGE A: Primary | ICD-10-CM

## 2024-10-17 PROCEDURE — 99999 PR PBB SHADOW E&M-EST. PATIENT-LVL III: CPT | Mod: PBBFAC,,,

## 2024-10-17 PROCEDURE — 1101F PT FALLS ASSESS-DOCD LE1/YR: CPT | Mod: CPTII,S$GLB,,

## 2024-10-17 PROCEDURE — 3074F SYST BP LT 130 MM HG: CPT | Mod: CPTII,S$GLB,,

## 2024-10-17 PROCEDURE — 3288F FALL RISK ASSESSMENT DOCD: CPT | Mod: CPTII,S$GLB,,

## 2024-10-17 PROCEDURE — 99214 OFFICE O/P EST MOD 30 MIN: CPT | Mod: S$GLB,,,

## 2024-10-17 PROCEDURE — G2211 COMPLEX E/M VISIT ADD ON: HCPCS | Mod: S$GLB,,,

## 2024-10-17 PROCEDURE — 3051F HG A1C>EQUAL 7.0%<8.0%: CPT | Mod: CPTII,S$GLB,,

## 2024-10-17 PROCEDURE — 1159F MED LIST DOCD IN RCRD: CPT | Mod: CPTII,S$GLB,,

## 2024-10-17 PROCEDURE — 3078F DIAST BP <80 MM HG: CPT | Mod: CPTII,S$GLB,,

## 2024-10-17 PROCEDURE — 1126F AMNT PAIN NOTED NONE PRSNT: CPT | Mod: CPTII,S$GLB,,

## 2024-10-17 PROCEDURE — 1160F RVW MEDS BY RX/DR IN RCRD: CPT | Mod: CPTII,S$GLB,,

## 2024-10-17 PROCEDURE — 3008F BODY MASS INDEX DOCD: CPT | Mod: CPTII,S$GLB,,

## 2024-10-17 NOTE — TELEPHONE ENCOUNTER
Pt. States that she seen you on 10/7 and her allergies have not gotten better. Would like to know if she can get steroid injection, but I guess she does not want to pay another co-pay. Please advise./CS

## 2024-10-18 ENCOUNTER — PATIENT MESSAGE (OUTPATIENT)
Dept: INTERNAL MEDICINE | Facility: CLINIC | Age: 74
End: 2024-10-18

## 2024-10-18 ENCOUNTER — CLINICAL SUPPORT (OUTPATIENT)
Dept: INTERNAL MEDICINE | Facility: CLINIC | Age: 74
End: 2024-10-18
Payer: MEDICARE

## 2024-10-18 DIAGNOSIS — J30.9 ALLERGIC CONJUNCTIVITIS AND RHINITIS, BILATERAL: Primary | ICD-10-CM

## 2024-10-18 DIAGNOSIS — H10.13 ALLERGIC CONJUNCTIVITIS AND RHINITIS, BILATERAL: Primary | ICD-10-CM

## 2024-10-18 PROCEDURE — 99999 PR PBB SHADOW E&M-EST. PATIENT-LVL II: CPT | Mod: PBBFAC,,,

## 2024-10-18 RX ORDER — TRIAMCINOLONE ACETONIDE 40 MG/ML
40 INJECTION, SUSPENSION INTRA-ARTICULAR; INTRAMUSCULAR
Status: COMPLETED | OUTPATIENT
Start: 2024-10-18 | End: 2024-10-18

## 2024-10-18 RX ADMIN — TRIAMCINOLONE ACETONIDE 40 MG: 40 INJECTION, SUSPENSION INTRA-ARTICULAR; INTRAMUSCULAR at 02:10

## 2024-10-18 NOTE — ASSESSMENT & PLAN NOTE
Cancer Staging   Chronic lymphocytic leukemia (CLL), clinical stage A  Staging form: Chronic Lymphocytic Leukemia / Small Lymphocytic Lymphoma, AJCC 8th Edition  - Clinical stage from 12/22/2023: Modified Stover Stage 0 (Modified Stover risk: Low, Binet: Stage A, Lymphocytosis: Present, Adenopathy: Absent, Organomegaly: Absent, Anemia: Absent, Thrombocytopenia: Absent) - Signed by Jess Chowdhury MD on 12/22/2023   CLL-IPI Intermediate Risk (trisomy 12 in 50% of  nuclei and a 13q deletion of both chromosomes 13 in 49% of   nuclei. )  --CLL FISH 11/17/23 confirmed the diagnosis; will defer bone marrow biopsy at this time  --PET-CT showed no LAD   --No indication for treatment at this time    She continues with c/o intermittent mild fatigue does not interfere with ADLs  --recommend initiation on MVI/B-complex  --will add some nutritional studies with next labs    Plan for continued surveillance with follow-up in 4 months with labs prior

## 2024-10-18 NOTE — PROGRESS NOTES
Subjective:     Patient ID:?Josiah Perkins is a 74 y.o. female.?? MR#: [id number]   ?   PRIMARY ONCOLOGIST: Dr. Chowdhury  ?   CHIEF COMPLAINT: follow-up/CLL?????   ?   ONCOLOGIC DIAGNOSIS:  Cancer Staging   Chronic lymphocytic leukemia (CLL), clinical stage A  Staging form: Chronic Lymphocytic Leukemia / Small Lymphocytic Lymphoma, AJCC 8th Edition  - Clinical stage from 12/22/2023: Modified Stover Stage 0 (Modified Stover risk: Low, Binet: Stage A, Lymphocytosis: Present, Adenopathy: Absent, Organomegaly: Absent, Anemia: Absent, Thrombocytopenia: Absent) - Signed by Jess Chowdhury MD on 12/22/2023       CURRENT TREATMENT: surveillance     HPI  Ms. Perkins is a very pleasant 74-year-old female with CLL, currently under surveillance, who presents today for routine follow-up. Pt states overall she is feeling well. she does note life stressors with recent death of sister and being caregiver for  mother. She c/o sinus congestion for which she has followed up with PCP.  Denies n/v/d/c, fever, chills, sob, cp, abnormal bleeding/bruising.       Oncology History   Chronic lymphocytic leukemia (CLL), clinical stage A   12/22/2023 Initial Diagnosis    Chronic lymphocytic leukemia (CLL), clinical stage A     12/22/2023 Cancer Staged    Staging form: Chronic Lymphocytic Leukemia / Small Lymphocytic Lymphoma, AJCC 8th Edition  - Clinical stage from 12/22/2023: Modified Stover Stage 0 (Modified Stover risk: Low, Binet: Stage A, Lymphocytosis: Present, Adenopathy: Absent, Organomegaly: Absent, Anemia: Absent, Thrombocytopenia: Absent)        Social History     Socioeconomic History    Marital status:     Number of children: 2   Occupational History    Occupation: Retired School councellor   Tobacco Use    Smoking status: Never    Smokeless tobacco: Never   Substance and Sexual Activity    Alcohol use: Yes     Comment: glass of wine every other day    Drug use: Never    Sexual activity: Not Currently   Other Topics Concern    Are  you pregnant or think you may be? No    Breast-feeding No     Social Drivers of Health     Financial Resource Strain: Low Risk  (2024)    Overall Financial Resource Strain (CARDIA)     Difficulty of Paying Living Expenses: Not hard at all   Food Insecurity: No Food Insecurity (2024)    Hunger Vital Sign     Worried About Running Out of Food in the Last Year: Never true     Ran Out of Food in the Last Year: Never true   Transportation Needs: No Transportation Needs (2024)    PRAPARE - Transportation     Lack of Transportation (Medical): No     Lack of Transportation (Non-Medical): No   Physical Activity: Insufficiently Active (11/15/2023)    Exercise Vital Sign     Days of Exercise per Week: 2 days     Minutes of Exercise per Session: 30 min   Stress: No Stress Concern Present (11/15/2023)    [place] citizen [place] [employer] - Occupational Stress Questionnaire     Feeling of Stress : Only a little   Housing Stability: Low Risk  (2024)    Housing Stability Vital Sign     Unable to Pay for Housing in the Last Year: No     Homeless in the Last Year: No      Family History   Problem Relation Name Age of Onset    Alzheimer's disease Mother      Hyperlipidemia Mother      Thyroid cancer Sister      Heart failure Sister           at 42    Hodgkin's lymphoma Sister      Breast cancer Maternal Aunt      Breast cancer Maternal Aunt      Heart disease Maternal Grandmother      Colon cancer Maternal Grandfather      Heart disease Paternal Grandmother      Diabetes Neg Hx        Past Surgical History:   Procedure Laterality Date    ANGIOGRAM, CORONARY, WITH LEFT HEART CATHETERIZATION N/A 2024    Procedure: Angiogram, Coronary, with Left Heart Cath;  Surgeon: Mahogany Ross MD;  Location: [facility] CATH LAB;  Service: Cardiology;  Laterality: N/A;    APPENDECTOMY      BREAST CYST ASPIRATION Right     2019    BREAST CYST EXCISION Right     CATARACT EXTRACTION        SECTION      x 2    COLONOSCOPY N/A 2024    Procedure: COLONOSCOPY;  Surgeon: Yane Yancey MD;  Location: [facility];  Service: Endoscopy;  Laterality: N/A;    ESOPHAGOGASTRODUODENOSCOPY N/A 2024    Procedure: EGD (ESOPHAGOGASTRODUODENOSCOPY);  Surgeon: Yane Yancey MD;  Location: [facility];  Service: Endoscopy;  Laterality: N/A;    HYSTERECTOMY      SINUS SURGERY      TONSILLECTOMY          Review of Systems   Constitutional:  Negative for activity change, appetite change, chills, fatigue and fever.   HENT:  Negative for congestion, facial swelling, nosebleeds, rhinorrhea, sore throat and trouble swallowing.    Eyes:  Negative for photophobia, pain and visual disturbance.   Respiratory:  Negative for cough, shortness of breath and wheezing.    Cardiovascular:  Negative for chest pain, palpitations and leg swelling.   Gastrointestinal:  Negative for abdominal distention, abdominal pain, anal bleeding, blood in stool, constipation, diarrhea, nausea, rectal pain and vomiting.   Genitourinary:  Negative for difficulty urinating, dysuria, hematuria and vaginal bleeding.   Musculoskeletal:  Negative for arthralgias.   Skin:  Negative for color change, pallor, rash and wound.   Neurological:  Negative for dizziness, light-headedness, numbness and headaches.   Hematological:  Negative for adenopathy. Does not bruise/bleed easily.   Psychiatric/Behavioral:  The patient is not nervous/anxious.        ?   A comprehensive 14-point review of systems was reviewed with patient and was negative other than as specified above.   ?     Objective:      Physical Exam  Constitutional:       General: She is not in acute distress.     Appearance: Normal appearance. She is not ill-appearing.   Eyes:      Conjunctiva/sclera: Conjunctivae normal.   Cardiovascular:      Rate and Rhythm: Normal rate.      Heart sounds: No murmur heard.  Abdominal:      Palpations: Abdomen is soft. There is no hepatomegaly or  splenomegaly.   Musculoskeletal:         General: Normal range of motion.      Cervical back: Neck supple. No rigidity or tenderness.      Right lower leg: No edema.      Left lower leg: No edema.   Lymphadenopathy:      Head:      Right side of head: No occipital adenopathy.      Left side of head: No occipital adenopathy.      Cervical: No cervical adenopathy.   Skin:     Findings: No rash.   Neurological:      Mental Status: She is alert and oriented to person, place, and time.   Psychiatric:         Mood and Affect: Mood normal.         Behavior: Behavior normal.         Thought Content: Thought content normal.         ?   Vitals:    10/17/24 1435   BP: 118/75   Pulse: 86      ?       ?   Laboratory:  ?   No visits with results within 1 Day(s) from this visit.   Latest known visit with results is:   Lab Visit on 10/14/2024   Component Date Value Ref Range Status    WBC 10/14/2024 15.79 (H)  3.90 - 12.70 K/uL Final    RBC 10/14/2024 4.25  4.00 - 5.40 M/uL Final    Hemoglobin 10/14/2024 13.2  12.0 - 16.0 g/dL Final    Hematocrit 10/14/2024 40.0  37.0 - 48.5 % Final    MCV 10/14/2024 94  82 - 98 fL Final    MCH 10/14/2024 31.1 (H)  27.0 - 31.0 pg Final    MCHC 10/14/2024 33.0  32.0 - 36.0 g/dL Final    RDW 10/14/2024 12.8  11.5 - 14.5 % Final    Platelets 10/14/2024 223  150 - 450 K/uL Final    MPV 10/14/2024 11.2  9.2 - 12.9 fL Final    Immature Granulocytes 10/14/2024 CANCELED  0.0 - 0.5 % Final    Immature Grans (Abs) 10/14/2024 CANCELED  0.00 - 0.04 K/uL Final    Lymph # 10/14/2024 CANCELED  1.0 - 4.8 K/uL Final    Mono # 10/14/2024 CANCELED  0.3 - 1.0 K/uL Final    Eos # 10/14/2024 CANCELED  0.0 - 0.5 K/uL Final    Baso # 10/14/2024 CANCELED  0.00 - 0.20 K/uL Final    nRBC 10/14/2024 0  0 /100 WBC Final    Gran % 10/14/2024 22.0 (L)  38.0 - 73.0 % Final    Lymph % 10/14/2024 64.0 (H)  18.0 - 48.0 % Final    Mono % 10/14/2024 14.0  4.0 - 15.0 % Final    Eosinophil % 10/14/2024 0.0  0.0 -  8.0 % Final    Basophil % 10/14/2024 0.0  0.0 - 1.9 % Final    Platelet Estimate 10/14/2024 Appears normal   Final    Poik 10/14/2024 Slight   Final    Tear Drop Cells 10/14/2024 Occasional   Final    Schistocytes 10/14/2024 Present   Final    Differential Method 10/14/2024 Manual   Final      ?   Imaging:    No results found for this or any previous visit (from the past 2160 hours).     No results found for this or any previous visit (from the past 2160 hours).     ?   Assessment/Plan:     Problem List Items Addressed This Visit       Chronic lymphocytic leukemia (CLL), clinical stage A - Primary      Cancer Staging   Chronic lymphocytic leukemia (CLL), clinical stage A  Staging form: Chronic Lymphocytic Leukemia / Small Lymphocytic Lymphoma, AJCC 8th Edition  - Clinical stage from 12/22/2023: Modified Stover Stage 0 (Modified Stover risk: Low, Binet: Stage A, Lymphocytosis: Present, Adenopathy: Absent, Organomegaly: Absent, Anemia: Absent, Thrombocytopenia: Absent) - Signed by Jess Chowdhury MD on 12/22/2023   CLL-IPI Intermediate Risk (trisomy 12 in 50% of  nuclei and a 13q deletion of both chromosomes 13 in 49% of   nuclei. )  --CLL FISH 11/17/23 confirmed the diagnosis; will defer bone marrow biopsy at this time  --PET-CT showed no LAD   --No indication for treatment at this time    She continues with c/o intermittent mild fatigue does not interfere with ADLs  --recommend initiation on MVI/B-complex  --will add some nutritional studies with next labs    Plan for continued surveillance with follow-up in 4 months with labs prior          Relevant Orders    CBC Auto Differential    Comprehensive Metabolic Panel    Ferritin    Vitamin B12    Iron and TIBC    Folate     Other Visit Diagnoses       CLL (chronic lymphocytic leukemia)        Relevant Orders    CBC Auto Differential    Comprehensive Metabolic Panel    Ferritin    Vitamin B12    Iron and TIBC    Folate    Dietary folate deficiency anemia         Relevant Orders    Vitamin B12    Folate               Med Onc Chart Routing      Follow up with physician 4 months. with labs 1-2 days prior   Follow up with MARKO    Infusion scheduling note    Injection scheduling note    Labs CBC, CMP, ferritin, iron and TIBC, vitamin D, vitamin B12 and folate   Scheduling:  Preferred lab:  Lab interval:     Imaging    Pharmacy appointment    Other referrals                   BLANE Mcnamara, FNP-C  Hematology/Oncology

## 2024-11-07 ENCOUNTER — PATIENT OUTREACH (OUTPATIENT)
Dept: ADMINISTRATIVE | Facility: HOSPITAL | Age: 74
End: 2024-11-07
Payer: MEDICARE

## 2024-11-07 ENCOUNTER — PATIENT MESSAGE (OUTPATIENT)
Dept: OTHER | Facility: OTHER | Age: 74
End: 2024-11-07
Payer: MEDICARE

## 2024-11-07 DIAGNOSIS — E11.65 UNCONTROLLED TYPE 2 DIABETES MELLITUS WITH HYPERGLYCEMIA: Primary | ICD-10-CM

## 2024-11-07 NOTE — PROGRESS NOTES
VBC OUTREACH: per chart review pt is overdue for Osteoporosis screen, Urine screen, spoke to pt she was shopping but asked me to send portal message and she will communicate a good date for her to schedule, I ordered and linked micro to lab appt already scheduled.

## 2024-11-08 ENCOUNTER — LAB VISIT (OUTPATIENT)
Dept: LAB | Facility: HOSPITAL | Age: 74
End: 2024-11-08
Attending: INTERNAL MEDICINE
Payer: MEDICARE

## 2024-11-08 DIAGNOSIS — D52.0 DIETARY FOLATE DEFICIENCY ANEMIA: ICD-10-CM

## 2024-11-08 DIAGNOSIS — C91.10 CHRONIC LYMPHOCYTIC LEUKEMIA (CLL), CLINICAL STAGE A: ICD-10-CM

## 2024-11-08 DIAGNOSIS — C91.10 CLL (CHRONIC LYMPHOCYTIC LEUKEMIA): ICD-10-CM

## 2024-11-08 DIAGNOSIS — E11.65 UNCONTROLLED TYPE 2 DIABETES MELLITUS WITH HYPERGLYCEMIA: ICD-10-CM

## 2024-11-08 LAB
ALBUMIN SERPL BCP-MCNC: 4.3 G/DL (ref 3.5–5.2)
ALP SERPL-CCNC: 58 U/L (ref 40–150)
ALT SERPL W/O P-5'-P-CCNC: 16 U/L (ref 10–44)
ANION GAP SERPL CALC-SCNC: 10 MMOL/L (ref 8–16)
AST SERPL-CCNC: 19 U/L (ref 10–40)
BASOPHILS NFR BLD: 0 % (ref 0–1.9)
BILIRUB SERPL-MCNC: 0.2 MG/DL (ref 0.1–1)
BUN SERPL-MCNC: 27 MG/DL (ref 8–23)
CALCIUM SERPL-MCNC: 9.6 MG/DL (ref 8.7–10.5)
CHLORIDE SERPL-SCNC: 105 MMOL/L (ref 95–110)
CO2 SERPL-SCNC: 25 MMOL/L (ref 23–29)
CREAT SERPL-MCNC: 0.8 MG/DL (ref 0.5–1.4)
DIFFERENTIAL METHOD BLD: ABNORMAL
EOSINOPHIL NFR BLD: 2 % (ref 0–8)
ERYTHROCYTE [DISTWIDTH] IN BLOOD BY AUTOMATED COUNT: 13 % (ref 11.5–14.5)
EST. GFR  (NO RACE VARIABLE): >60 ML/MIN/1.73 M^2
FERRITIN SERPL-MCNC: 68 NG/ML (ref 20–300)
FOLATE SERPL-MCNC: 18.8 NG/ML (ref 4–24)
GLUCOSE SERPL-MCNC: 122 MG/DL (ref 70–110)
HCT VFR BLD AUTO: 39.9 % (ref 37–48.5)
HGB BLD-MCNC: 13.3 G/DL (ref 12–16)
IMM GRANULOCYTES # BLD AUTO: ABNORMAL K/UL (ref 0–0.04)
IMM GRANULOCYTES NFR BLD AUTO: ABNORMAL % (ref 0–0.5)
IRON SERPL-MCNC: 80 UG/DL (ref 30–160)
LYMPHOCYTES NFR BLD: 61 % (ref 18–48)
MCH RBC QN AUTO: 31.4 PG (ref 27–31)
MCHC RBC AUTO-ENTMCNC: 33.3 G/DL (ref 32–36)
MCV RBC AUTO: 94 FL (ref 82–98)
MONOCYTES NFR BLD: 3 % (ref 4–15)
NEUTROPHILS NFR BLD: 34 % (ref 38–73)
NRBC BLD-RTO: 0 /100 WBC
PLATELET # BLD AUTO: 222 K/UL (ref 150–450)
PMV BLD AUTO: 11.2 FL (ref 9.2–12.9)
POTASSIUM SERPL-SCNC: 4.7 MMOL/L (ref 3.5–5.1)
PROT SERPL-MCNC: 7.1 G/DL (ref 6–8.4)
RBC # BLD AUTO: 4.24 M/UL (ref 4–5.4)
SATURATED IRON: 24 % (ref 20–50)
SODIUM SERPL-SCNC: 140 MMOL/L (ref 136–145)
TOTAL IRON BINDING CAPACITY: 332 UG/DL (ref 250–450)
TRANSFERRIN SERPL-MCNC: 224 MG/DL (ref 200–375)
VIT B12 SERPL-MCNC: 723 PG/ML (ref 210–950)
WBC # BLD AUTO: 18.57 K/UL (ref 3.9–12.7)

## 2024-11-08 PROCEDURE — 84466 ASSAY OF TRANSFERRIN: CPT

## 2024-11-08 PROCEDURE — 82607 VITAMIN B-12: CPT

## 2024-11-08 PROCEDURE — 85060 BLOOD SMEAR INTERPRETATION: CPT | Mod: ,,, | Performed by: STUDENT IN AN ORGANIZED HEALTH CARE EDUCATION/TRAINING PROGRAM

## 2024-11-08 PROCEDURE — 83036 HEMOGLOBIN GLYCOSYLATED A1C: CPT | Performed by: FAMILY MEDICINE

## 2024-11-08 PROCEDURE — 80053 COMPREHEN METABOLIC PANEL: CPT

## 2024-11-08 PROCEDURE — 85007 BL SMEAR W/DIFF WBC COUNT: CPT

## 2024-11-08 PROCEDURE — 85027 COMPLETE CBC AUTOMATED: CPT

## 2024-11-08 PROCEDURE — 82043 UR ALBUMIN QUANTITATIVE: CPT | Performed by: FAMILY MEDICINE

## 2024-11-08 PROCEDURE — 36415 COLL VENOUS BLD VENIPUNCTURE: CPT | Mod: PO | Performed by: FAMILY MEDICINE

## 2024-11-08 PROCEDURE — 82746 ASSAY OF FOLIC ACID SERUM: CPT

## 2024-11-08 PROCEDURE — 82570 ASSAY OF URINE CREATININE: CPT | Performed by: FAMILY MEDICINE

## 2024-11-08 PROCEDURE — 82728 ASSAY OF FERRITIN: CPT

## 2024-11-09 LAB
ALBUMIN/CREAT UR: 22.9 UG/MG (ref 0–30)
CREAT UR-MCNC: 139.6 MG/DL (ref 15–325)
ESTIMATED AVG GLUCOSE: 140 MG/DL (ref 68–131)
HBA1C MFR BLD: 6.5 % (ref 4–5.6)
MICROALBUMIN UR DL<=1MG/L-MCNC: 32 UG/ML

## 2024-11-11 LAB — PATH REV BLD -IMP: NORMAL

## 2024-11-13 DIAGNOSIS — Z78.0 MENOPAUSE: ICD-10-CM

## 2024-11-24 DIAGNOSIS — K21.9 GASTROESOPHAGEAL REFLUX DISEASE, UNSPECIFIED WHETHER ESOPHAGITIS PRESENT: ICD-10-CM

## 2024-11-25 NOTE — TELEPHONE ENCOUNTER
No care due was identified.  [facility] Embedded Care Due Messages. Reference number: [id number].   11/24/2024 9:06:01 PM CST

## 2024-11-26 RX ORDER — OMEPRAZOLE 20 MG/1
20 CAPSULE, DELAYED RELEASE ORAL DAILY
Qty: 180 CAPSULE | Refills: 1 | Status: SHIPPED | OUTPATIENT
Start: 2024-11-26

## 2024-12-16 ENCOUNTER — OFFICE VISIT (OUTPATIENT)
Dept: CARDIOLOGY | Facility: CLINIC | Age: 74
End: 2024-12-16
Payer: MEDICARE

## 2024-12-16 VITALS
DIASTOLIC BLOOD PRESSURE: 70 MMHG | OXYGEN SATURATION: 97 % | HEART RATE: 78 BPM | SYSTOLIC BLOOD PRESSURE: 120 MMHG | HEIGHT: 60 IN | WEIGHT: 132.25 LBS | BODY MASS INDEX: 25.97 KG/M2

## 2024-12-16 DIAGNOSIS — I25.10 CORONARY ARTERY DISEASE INVOLVING NATIVE CORONARY ARTERY OF NATIVE HEART WITHOUT ANGINA PECTORIS: Primary | ICD-10-CM

## 2024-12-16 DIAGNOSIS — C91.10 CHRONIC LYMPHOCYTIC LEUKEMIA (CLL), CLINICAL STAGE A: ICD-10-CM

## 2024-12-16 DIAGNOSIS — E78.5 DYSLIPIDEMIA ASSOCIATED WITH TYPE 2 DIABETES MELLITUS: ICD-10-CM

## 2024-12-16 DIAGNOSIS — E11.69 DYSLIPIDEMIA ASSOCIATED WITH TYPE 2 DIABETES MELLITUS: ICD-10-CM

## 2024-12-16 DIAGNOSIS — I44.0 FIRST DEGREE AV BLOCK: ICD-10-CM

## 2024-12-16 PROCEDURE — 3008F BODY MASS INDEX DOCD: CPT | Mod: CPTII,S$GLB,, | Performed by: INTERNAL MEDICINE

## 2024-12-16 PROCEDURE — 99214 OFFICE O/P EST MOD 30 MIN: CPT | Mod: S$GLB,,, | Performed by: INTERNAL MEDICINE

## 2024-12-16 PROCEDURE — 3074F SYST BP LT 130 MM HG: CPT | Mod: CPTII,S$GLB,, | Performed by: INTERNAL MEDICINE

## 2024-12-16 PROCEDURE — 1101F PT FALLS ASSESS-DOCD LE1/YR: CPT | Mod: CPTII,S$GLB,, | Performed by: INTERNAL MEDICINE

## 2024-12-16 PROCEDURE — 1159F MED LIST DOCD IN RCRD: CPT | Mod: CPTII,S$GLB,, | Performed by: INTERNAL MEDICINE

## 2024-12-16 PROCEDURE — 99999 PR PBB SHADOW E&M-EST. PATIENT-LVL III: CPT | Mod: PBBFAC,,, | Performed by: INTERNAL MEDICINE

## 2024-12-16 PROCEDURE — 3078F DIAST BP <80 MM HG: CPT | Mod: CPTII,S$GLB,, | Performed by: INTERNAL MEDICINE

## 2024-12-16 PROCEDURE — 3066F NEPHROPATHY DOC TX: CPT | Mod: CPTII,S$GLB,, | Performed by: INTERNAL MEDICINE

## 2024-12-16 PROCEDURE — 1126F AMNT PAIN NOTED NONE PRSNT: CPT | Mod: CPTII,S$GLB,, | Performed by: INTERNAL MEDICINE

## 2024-12-16 PROCEDURE — 3044F HG A1C LEVEL LT 7.0%: CPT | Mod: CPTII,S$GLB,, | Performed by: INTERNAL MEDICINE

## 2024-12-16 PROCEDURE — 3288F FALL RISK ASSESSMENT DOCD: CPT | Mod: CPTII,S$GLB,, | Performed by: INTERNAL MEDICINE

## 2024-12-16 PROCEDURE — 3061F NEG MICROALBUMINURIA REV: CPT | Mod: CPTII,S$GLB,, | Performed by: INTERNAL MEDICINE

## 2024-12-16 NOTE — PROGRESS NOTES
Subjective:   Patient ID:  Josiah Perkins is a 74 y.o. female who presents for evaluation of No chief complaint on file.    12.16.2024  Comes in for six-month visit.    Denies any significant ZAZUETA, angina, palpitations, lower extremity swelling syncope or presyncope   Compliant with medications.        6.17.2024  Comes in for follow-up after her recent coronary angiogram.    Which showed minimal CAD mostly to her LAD.  However no focal stenosis.  He does have significantly tortuous coronaries however.    Continues to have same atypical chest pain.    Her access site looks normal.  She is somewhat tender on her right upper arm however no swelling or hematoma only minimal symptoms however.    Complains of feeling dizzy since getting on amlodipine.    5.29.2024  Comes in for follow-up.  She had her stress echo after last visit it showed 5 mm ST-depression along with inferoapical wall hypokinesis with the stress.    She continues to have chest pain.  She states that her chest pain is worse lately.  He states that her back pain has resolved but she continues to have midsternal chest pain calls it significant enough to be worried about..        5.23.2024  74-year-old female with history as below including diabetes and CKD.    And hyperlipidemia.  Never smoker.    Comes in for evaluation of chest pain that started a few weeks ago.  Her pain is described to the left side of the chest and radiates to the back.    She states it feels better sometimes when she lifts her arms up.  She states that her pain however started to move to the midsternal part of her chest and she was more concerned and came in for a evaluation.    Her symptoms appear however to be exertional.    No dyspnea on exertion.    Her pain is not reproducible on exam.  Past Medical History:   Diagnosis Date    Diabetes mellitus, type 2     Hemorrhoids     Hyperlipidemia     Leukemia, unspecified, in remission        Past Surgical History:   Procedure Laterality  Date    ANGIOGRAM, CORONARY, WITH LEFT HEART CATHETERIZATION N/A 2024    Procedure: Angiogram, Coronary, with Left Heart Cath;  Surgeon: Mahogany Ross MD;  Location: [facility] CATH LAB;  Service: Cardiology;  Laterality: N/A;    APPENDECTOMY      BREAST CYST ASPIRATION Right     2019    BREAST CYST EXCISION Right     CATARACT EXTRACTION       SECTION      x 2    COLONOSCOPY N/A 2024    Procedure: COLONOSCOPY;  Surgeon: Yane Yancey MD;  Location: [facility] ENDO;  Service: Endoscopy;  Laterality: N/A;    ESOPHAGOGASTRODUODENOSCOPY N/A 2024    Procedure: EGD (ESOPHAGOGASTRODUODENOSCOPY);  Surgeon: Yane Yancey MD;  Location: [facility] ENDO;  Service: Endoscopy;  Laterality: N/A;    HYSTERECTOMY      SINUS SURGERY      TONSILLECTOMY         Social History     Tobacco Use    Smoking status: Never    Smokeless tobacco: Never   Substance Use Topics    Alcohol use: Yes     Comment: glass of wine every other day    Drug use: Never       Family History   Problem Relation Name Age of Onset    Alzheimer's disease Mother      Hyperlipidemia Mother      Thyroid cancer Sister      Heart failure Sister           at 42    Hodgkin's lymphoma Sister      Breast cancer Maternal Aunt      Breast cancer Maternal Aunt      Heart disease Maternal Grandmother      Colon cancer Maternal Grandfather      Heart disease Paternal Grandmother      Diabetes Neg Hx         Review of Systems   Cardiovascular:  Positive for chest pain. Negative for dyspnea on exertion, palpitations and syncope.   Respiratory:  Positive for shortness of breath.    Genitourinary: Negative.    Neurological:  Positive for dizziness.       Current Outpatient Medications on File Prior to Visit   Medication Sig    clindamycin phosphate 1% (CLINDAGEL) 1 % gel AAA qd to bid prn acne. Decrease frequency if any dryness.    ketotifen (ZADITOR) 0.025 % (0.035 %) ophthalmic solution Place 1 drop into both eyes 2 (two) times daily.    metFORMIN  (GLUCOPHAGE-XR) 500 MG ER 24hr tablet Take 1 tablet (500 mg total) by mouth 2 (two) times daily with meals.    multivitamin capsule Take 1 capsule by mouth once daily.    omeprazole (PRILOSEC) 20 MG capsule Take 1 capsule (20 mg total) by mouth once daily.    pimecrolimus (ELIDEL) 1 % cream Apply topically 2 (two) times daily. PRN eyelid rash flares. Non-steroid.    rosuvastatin (CRESTOR) 20 MG tablet Take 1 tablet (20 mg total) by mouth once daily.    traZODone (DESYREL) 100 MG tablet Take 1 tablet (100 mg total) by mouth nightly as needed for Insomnia.    tretinoin (RETIN-A) 0.025 % cream Apply pea-sized amount to entire face at bedtime.  If dryness, use every third night and increase as tolerated to every night.    vitamin E 400 UNIT capsule Take 1 capsule (400 Units total) by mouth 2 (two) times daily.    amLODIPine (NORVASC) 2.5 MG tablet Take 2.5 mg by mouth. (Patient not taking: Reported on 10/7/2024)    conjugated estrogens (PREMARIN) vaginal cream 0.5 g once daily for 2 weeks, then reduce dose to 0.5 - 1 g  1-3 times/week.    montelukast (SINGULAIR) 10 mg tablet Take 1 tablet (10 mg total) by mouth every evening.     No current facility-administered medications on file prior to visit.       Objective:   Objective:  Wt Readings from Last 3 Encounters:   12/16/24 60 kg (132 lb 4.4 oz)   10/17/24 58.6 kg (129 lb 3 oz)   10/07/24 58.9 kg (129 lb 13.6 oz)     Temp Readings from Last 3 Encounters:   10/07/24 97.3 °F (36.3 °C) (Tympanic)   06/11/24 97.7 °F (36.5 °C) (Temporal)   06/07/24 96.9 °F (36.1 °C) (Temporal)     BP Readings from Last 3 Encounters:   12/16/24 120/70   10/17/24 118/75   10/07/24 104/60     Pulse Readings from Last 3 Encounters:   12/16/24 78   10/17/24 86   10/07/24 74       Physical Exam  Vitals reviewed.   Constitutional:       Appearance: She is well-developed.   Neck:      Vascular: No carotid bruit.   Cardiovascular:      Rate and Rhythm: Normal rate and regular rhythm.      Pulses:  Intact distal pulses.      Heart sounds: Normal heart sounds. No murmur heard.  Pulmonary:      Breath sounds: Normal breath sounds.   Neurological:      Mental Status: She is oriented to person, place, and time.         Lab Results   Component Value Date    CHOL 185 06/06/2024    CHOL 168 11/01/2023    CHOL 165 10/31/2022     Lab Results   Component Value Date    HDL 58 06/06/2024    HDL 61 11/01/2023    HDL 59 10/31/2022     Lab Results   Component Value Date    LDLCALC 73.8 06/06/2024    LDLCALC 73.2 11/01/2023    LDLCALC 73.0 10/31/2022     Lab Results   Component Value Date    TRIG 266 (H) 06/06/2024    TRIG 169 (H) 11/01/2023    TRIG 165 (H) 10/31/2022     Lab Results   Component Value Date    CHOLHDL 31.4 06/06/2024    CHOLHDL 36.3 11/01/2023    CHOLHDL 35.8 10/31/2022       Chemistry        Component Value Date/Time     11/08/2024 1523    K 4.7 11/08/2024 1523     11/08/2024 1523    CO2 25 11/08/2024 1523    BUN 27 (H) 11/08/2024 1523    CREATININE 0.8 11/08/2024 1523     (H) 11/08/2024 1523        Component Value Date/Time    CALCIUM 9.6 11/08/2024 1523    ALKPHOS 58 11/08/2024 1523    AST 19 11/08/2024 1523    ALT 16 11/08/2024 1523    BILITOT 0.2 11/08/2024 1523    ESTGFRAFRICA >60.0 03/28/2022 1019    EGFRNONAA >60.0 03/28/2022 1019          Lab Results   Component Value Date    TSH 2.361 06/06/2024     Lab Results   Component Value Date    INR 0.9 05/29/2024     Lab Results   Component Value Date    WBC 18.57 (H) 11/08/2024    HGB 13.3 11/08/2024    HCT 39.9 11/08/2024    MCV 94 11/08/2024     11/08/2024     BNP  @LABRCNTIP(BNP,BNPTRIAGEBLO)@  CrCl cannot be calculated (Patient's most recent lab result is older than the maximum 7 days allowed.).     Imaging:  ======    No results found for this or any previous visit.    No results found for this or any previous visit.    No results found for this or any previous visit.    No results found for this or any previous visit.    No  valid procedures specified.    No results found for this or any previous visit.      No results found for this or any previous visit.      No results found for this or any previous visit.      Diagnostic Results:  ECG: Reviewed    Summary 5.27.2024  Show Result Comparison     Left Ventricle: The left ventricle is normal in size. Normal wall thickness. There is normal systolic function with a visually estimated ejection fraction of 55 - 60%. Grade I diastolic dysfunction.    Right Ventricle: Normal right ventricular cavity size. Systolic function is normal.    Tricuspid Valve: There is mild regurgitation.    Pulmonary Artery: The estimated pulmonary artery systolic pressure is 29 mmHg.    IVC/SVC: Normal venous pressure at 3 mmHg.    Stress Protocol: The patient exercised for 7 minutes 14 seconds on a Salvador protocol, corresponding to a functional capacity of 9METS, achieving a peak heart rate of 134 bpm, which is 95% of the age predicted maximum heart rate. The patient experienced no angina during the test. Their exercise capacity was normal. The patient reported no symptoms during the stress test. The test was stopped because the end of the protocol was reached.    Baseline ECG: The Baseline ECG reveals sinus rhythm with 1st degree A/V block. The axis is normal. The baseline ECG has non-specific ST-T wave changes.    Stress ECG: There is 5.0 mm of upward-sloping ST segment depression noted during stress. There are no arrhythmias during stress.    There is hypertensive blood pressure response with stress.    ECG Conclusion: The ECG portion of the study is negative for ischemia.    Post-stress Impression: The apical septal and mid inferoseptal wall is hypokinetic at peak stress consistent with ischemia.     6.2024    Interpretation Summary  Show Result Comparison     There is 20-39% right Internal Carotid Stenosis.    There is 20-39% left Internal Carotid Stenosis.    Velocities possibly increased due to tortuosity          The 10-year ASCVD risk score (Aston SANTANA, et al., 2019) is: 20.9%    Values used to calculate the score:      Age: 74 years      Sex: Female      Is Non- : No      Diabetic: Yes      Tobacco smoker: No      Systolic Blood Pressure: 120 mmHg      Is BP treated: No      HDL Cholesterol: 58 mg/dL      Total Cholesterol: 185 mg/dL        Assessment and Plan:       Coronary artery disease involving native coronary artery of native heart without angina pectoris    First degree AV block    Chronic lymphocytic leukemia (CLL), clinical stage A    Dyslipidemia associated with type 2 diabetes mellitus          Reviewed all tests and above medical conditions with patient in detail and formulated treatment plan.  Risk factor modification discussed.   Cardiac low salt diet discussed.  Maintaining healthy weight and weight loss goals were discussed in clinic.  Aspirin 81 mg daily.    Continue with statins.    X-ray of the thoracic spine was normal..  Reviewed coronary angiogram films with the patient.    Atypical chest pain likely noncardiac.  Follow with PCP.  Carotid less 40 % , tortuous   A1C 6.5 On metformin, states jardiance was not covered by insurance, she wants to follow with PCP     Follow up 12 months

## 2024-12-17 ENCOUNTER — PATIENT MESSAGE (OUTPATIENT)
Dept: INTERNAL MEDICINE | Facility: CLINIC | Age: 74
End: 2024-12-17
Payer: MEDICARE

## 2024-12-17 ENCOUNTER — PATIENT MESSAGE (OUTPATIENT)
Dept: CARDIOLOGY | Facility: CLINIC | Age: 74
End: 2024-12-17
Payer: MEDICARE

## 2024-12-17 DIAGNOSIS — E78.5 DYSLIPIDEMIA ASSOCIATED WITH TYPE 2 DIABETES MELLITUS: Primary | ICD-10-CM

## 2024-12-17 DIAGNOSIS — E11.69 DYSLIPIDEMIA ASSOCIATED WITH TYPE 2 DIABETES MELLITUS: Primary | ICD-10-CM

## 2024-12-17 DIAGNOSIS — Z12.31 BREAST CANCER SCREENING BY MAMMOGRAM: Primary | ICD-10-CM

## 2024-12-20 DIAGNOSIS — C91.10 CLL (CHRONIC LYMPHOCYTIC LEUKEMIA): ICD-10-CM

## 2024-12-20 DIAGNOSIS — R79.89 ABNORMAL CBC: ICD-10-CM

## 2024-12-20 DIAGNOSIS — C91.10 CHRONIC LYMPHOCYTIC LEUKEMIA (CLL), CLINICAL STAGE A: Primary | ICD-10-CM

## 2024-12-28 DIAGNOSIS — L70.0 CYSTIC ACNE VULGARIS: ICD-10-CM

## 2025-01-03 ENCOUNTER — HOSPITAL ENCOUNTER (OUTPATIENT)
Dept: RADIOLOGY | Facility: HOSPITAL | Age: 75
Discharge: HOME | End: 2025-01-03
Attending: FAMILY MEDICINE
Payer: MEDICARE

## 2025-01-03 DIAGNOSIS — E11.69 DYSLIPIDEMIA ASSOCIATED WITH TYPE 2 DIABETES MELLITUS: ICD-10-CM

## 2025-01-03 DIAGNOSIS — Z12.31 BREAST CANCER SCREENING BY MAMMOGRAM: ICD-10-CM

## 2025-01-03 DIAGNOSIS — E78.5 DYSLIPIDEMIA ASSOCIATED WITH TYPE 2 DIABETES MELLITUS: ICD-10-CM

## 2025-01-03 PROCEDURE — 77067 SCR MAMMO BI INCL CAD: CPT | Mod: 26,,, | Performed by: RADIOLOGY

## 2025-01-03 PROCEDURE — 77063 BREAST TOMOSYNTHESIS BI: CPT | Mod: TC

## 2025-01-03 PROCEDURE — 77063 BREAST TOMOSYNTHESIS BI: CPT | Mod: 26,,, | Performed by: RADIOLOGY

## 2025-01-06 ENCOUNTER — PATIENT MESSAGE (OUTPATIENT)
Dept: DERMATOLOGY | Facility: CLINIC | Age: 75
End: 2025-01-06
Payer: MEDICARE

## 2025-01-07 ENCOUNTER — PATIENT MESSAGE (OUTPATIENT)
Dept: DERMATOLOGY | Facility: CLINIC | Age: 75
End: 2025-01-07
Payer: MEDICARE

## 2025-01-07 RX ORDER — CLINDAMYCIN PHOSPHATE 10 MG/G
GEL TOPICAL
Qty: 30 G | Refills: 1 | Status: SHIPPED | OUTPATIENT
Start: 2025-01-07

## 2025-01-07 RX ORDER — TRETINOIN 0.25 MG/G
CREAM TOPICAL
Qty: 20 G | Refills: 4 | Status: SHIPPED | OUTPATIENT
Start: 2025-01-07 | End: 2025-12-28

## 2025-01-13 DIAGNOSIS — L70.0 CYSTIC ACNE VULGARIS: ICD-10-CM

## 2025-01-13 DIAGNOSIS — L90.5 ACNE SCARRING: Primary | ICD-10-CM

## 2025-01-13 RX ORDER — CLINDAMYCIN PHOSPHATE 11.9 MG/ML
SOLUTION TOPICAL
Qty: 30 ML | Refills: 2 | Status: SHIPPED | OUTPATIENT
Start: 2025-01-13

## 2025-01-15 ENCOUNTER — PATIENT MESSAGE (OUTPATIENT)
Dept: INTERNAL MEDICINE | Facility: CLINIC | Age: 75
End: 2025-01-15
Payer: MEDICARE

## 2025-01-30 DIAGNOSIS — Z00.00 ENCOUNTER FOR MEDICARE ANNUAL WELLNESS EXAM: ICD-10-CM

## 2025-02-10 ENCOUNTER — PATIENT OUTREACH (OUTPATIENT)
Dept: ADMINISTRATIVE | Facility: HOSPITAL | Age: 75
End: 2025-02-10
Payer: MEDICARE

## 2025-02-10 NOTE — PROGRESS NOTES
VBC OUTREACH: per chart review pt OVERDUE for DM Foot Exam, PCP appt already scheduled 3.19.25, pt does NOT MEET criteria for VB program.

## 2025-02-17 ENCOUNTER — LAB VISIT (OUTPATIENT)
Dept: LAB | Facility: HOSPITAL | Age: 75
End: 2025-02-17
Attending: INTERNAL MEDICINE
Payer: MEDICARE

## 2025-02-17 DIAGNOSIS — C91.10 CLL (CHRONIC LYMPHOCYTIC LEUKEMIA): ICD-10-CM

## 2025-02-17 DIAGNOSIS — R79.89 ABNORMAL CBC: ICD-10-CM

## 2025-02-17 DIAGNOSIS — C91.10 CHRONIC LYMPHOCYTIC LEUKEMIA (CLL), CLINICAL STAGE A: ICD-10-CM

## 2025-02-17 LAB
ALBUMIN SERPL BCP-MCNC: 4.5 G/DL (ref 3.5–5.2)
ALP SERPL-CCNC: 58 U/L (ref 40–150)
ALT SERPL W/O P-5'-P-CCNC: 23 U/L (ref 10–44)
ANION GAP SERPL CALC-SCNC: 10 MMOL/L (ref 8–16)
ANISOCYTOSIS BLD QL SMEAR: SLIGHT
AST SERPL-CCNC: 19 U/L (ref 10–40)
BASOPHILS NFR BLD: 0 % (ref 0–1.9)
BILIRUB SERPL-MCNC: 0.4 MG/DL (ref 0.1–1)
BUN SERPL-MCNC: 19 MG/DL (ref 8–23)
BURR CELLS BLD QL SMEAR: ABNORMAL
CALCIUM SERPL-MCNC: 10.2 MG/DL (ref 8.7–10.5)
CHLORIDE SERPL-SCNC: 105 MMOL/L (ref 95–110)
CO2 SERPL-SCNC: 25 MMOL/L (ref 23–29)
CREAT SERPL-MCNC: 1 MG/DL (ref 0.5–1.4)
DIFFERENTIAL METHOD BLD: ABNORMAL
EOSINOPHIL NFR BLD: 0 % (ref 0–8)
ERYTHROCYTE [DISTWIDTH] IN BLOOD BY AUTOMATED COUNT: 12.8 % (ref 11.5–14.5)
EST. GFR  (NO RACE VARIABLE): 59 ML/MIN/1.73 M^2
FERRITIN SERPL-MCNC: 64 NG/ML (ref 20–300)
GLUCOSE SERPL-MCNC: 149 MG/DL (ref 70–110)
HCT VFR BLD AUTO: 44.3 % (ref 37–48.5)
HGB BLD-MCNC: 14.2 G/DL (ref 12–16)
IMM GRANULOCYTES # BLD AUTO: ABNORMAL K/UL (ref 0–0.04)
IMM GRANULOCYTES NFR BLD AUTO: ABNORMAL % (ref 0–0.5)
IRON SERPL-MCNC: 81 UG/DL (ref 30–160)
LDH SERPL L TO P-CCNC: 156 U/L (ref 110–260)
LYMPHOCYTES NFR BLD: 68 % (ref 18–48)
MAGNESIUM SERPL-MCNC: 2.1 MG/DL (ref 1.6–2.6)
MCH RBC QN AUTO: 31.8 PG (ref 27–31)
MCHC RBC AUTO-ENTMCNC: 32.1 G/DL (ref 32–36)
MCV RBC AUTO: 99 FL (ref 82–98)
MONOCYTES NFR BLD: 11 % (ref 4–15)
NEUTROPHILS NFR BLD: 21 % (ref 38–73)
NRBC BLD-RTO: 0 /100 WBC
PHOSPHATE SERPL-MCNC: 3.6 MG/DL (ref 2.7–4.5)
PLATELET # BLD AUTO: 232 K/UL (ref 150–450)
PLATELET BLD QL SMEAR: ABNORMAL
PMV BLD AUTO: 11 FL (ref 9.2–12.9)
POIKILOCYTOSIS BLD QL SMEAR: SLIGHT
POTASSIUM SERPL-SCNC: 4.4 MMOL/L (ref 3.5–5.1)
PROT SERPL-MCNC: 7.3 G/DL (ref 6–8.4)
RBC # BLD AUTO: 4.47 M/UL (ref 4–5.4)
SATURATED IRON: 21 % (ref 20–50)
SODIUM SERPL-SCNC: 140 MMOL/L (ref 136–145)
TOTAL IRON BINDING CAPACITY: 379 UG/DL (ref 250–450)
TRANSFERRIN SERPL-MCNC: 256 MG/DL (ref 200–375)
WBC # BLD AUTO: 18.24 K/UL (ref 3.9–12.7)

## 2025-02-17 PROCEDURE — 84466 ASSAY OF TRANSFERRIN: CPT

## 2025-02-17 PROCEDURE — 84100 ASSAY OF PHOSPHORUS: CPT | Performed by: INTERNAL MEDICINE

## 2025-02-17 PROCEDURE — 83735 ASSAY OF MAGNESIUM: CPT | Performed by: INTERNAL MEDICINE

## 2025-02-17 PROCEDURE — 83615 LACTATE (LD) (LDH) ENZYME: CPT | Performed by: INTERNAL MEDICINE

## 2025-02-17 PROCEDURE — 80053 COMPREHEN METABOLIC PANEL: CPT | Performed by: INTERNAL MEDICINE

## 2025-02-17 PROCEDURE — 36415 COLL VENOUS BLD VENIPUNCTURE: CPT | Mod: PO | Performed by: INTERNAL MEDICINE

## 2025-02-17 PROCEDURE — 85027 COMPLETE CBC AUTOMATED: CPT | Performed by: INTERNAL MEDICINE

## 2025-02-17 PROCEDURE — 85007 BL SMEAR W/DIFF WBC COUNT: CPT | Performed by: INTERNAL MEDICINE

## 2025-02-17 PROCEDURE — 82728 ASSAY OF FERRITIN: CPT

## 2025-02-18 ENCOUNTER — PATIENT MESSAGE (OUTPATIENT)
Dept: DERMATOLOGY | Facility: CLINIC | Age: 75
End: 2025-02-18
Payer: MEDICARE

## 2025-02-18 LAB — PATH REV BLD -IMP: NORMAL

## 2025-02-19 ENCOUNTER — OFFICE VISIT (OUTPATIENT)
Dept: HEMATOLOGY/ONCOLOGY | Facility: CLINIC | Age: 75
End: 2025-02-19
Payer: MEDICARE

## 2025-02-19 VITALS
RESPIRATION RATE: 20 BRPM | TEMPERATURE: 97 F | HEIGHT: 60 IN | SYSTOLIC BLOOD PRESSURE: 108 MMHG | WEIGHT: 132.5 LBS | OXYGEN SATURATION: 98 % | HEART RATE: 88 BPM | DIASTOLIC BLOOD PRESSURE: 69 MMHG | BODY MASS INDEX: 26.01 KG/M2

## 2025-02-19 DIAGNOSIS — B37.31 VAGINAL YEAST INFECTION: ICD-10-CM

## 2025-02-19 DIAGNOSIS — C91.10 CHRONIC LYMPHOCYTIC LEUKEMIA (CLL), CLINICAL STAGE A: Primary | ICD-10-CM

## 2025-02-19 RX ORDER — FLUCONAZOLE 150 MG/1
150 TABLET ORAL DAILY
Qty: 1 TABLET | Refills: 0 | Status: SHIPPED | OUTPATIENT
Start: 2025-02-19 | End: 2025-02-20

## 2025-02-19 NOTE — PROGRESS NOTES
Patient ID: Josiah Perkins   Reason for Visit: Follow-up (CLL)  MRN:  [id number]     Oncologic Diagnosis: CLL  Current Treatment: Surveillance  Subjective   Ms. Perkins presents for follow up and to review her lab results.     Since our last visit she has been doing well.  She does have a vaginal yeast infection so will prescribe her fluconazole.  She has no B symptoms and her chronic symptoms remain unchanged.  Labs reviewed and stable.    Review of Systems   Constitutional:  Negative for activity change, chills, diaphoresis, fatigue, fever and unexpected weight change.   Eyes:  Negative for visual disturbance.   Respiratory:  Negative for shortness of breath.    Cardiovascular:  Negative for chest pain.   Gastrointestinal:  Negative for abdominal distention, abdominal pain, blood in stool, constipation, diarrhea, nausea and vomiting.   Genitourinary:  Positive for frequency. Negative for difficulty urinating and hematuria.   Musculoskeletal:  Negative for back pain and myalgias.   Skin:  Negative for rash.   Neurological:  Negative for light-headedness and headaches.   Hematological:  Negative for adenopathy. Does not bruise/bleed easily.   Psychiatric/Behavioral:  The patient is not nervous/anxious.      History     Oncology History   Chronic lymphocytic leukemia (CLL), clinical stage A   12/22/2023 Initial Diagnosis    Chronic lymphocytic leukemia (CLL), clinical stage A     12/22/2023 Cancer Staged    Staging form: Chronic Lymphocytic Leukemia / Small Lymphocytic Lymphoma, AJCC 8th Edition  - Clinical stage from 12/22/2023: Modified Stover Stage 0 (Modified Stover risk: Low, Binet: Stage A, Lymphocytosis: Present, Adenopathy: Absent, Organomegaly: Absent, Anemia: Absent, Thrombocytopenia: Absent)         Past Medical History:   Diagnosis Date    Diabetes mellitus, type 2     Hemorrhoids     Hyperlipidemia     Leukemia, unspecified, in remission        Past Surgical History:   Procedure Laterality Date    ANGIOGRAM,  CORONARY, WITH LEFT HEART CATHETERIZATION N/A 2024    Procedure: Angiogram, Coronary, with Left Heart Cath;  Surgeon: Mahogany Ross MD;  Location: [facility] CATH LAB;  Service: Cardiology;  Laterality: N/A;    APPENDECTOMY      BREAST CYST ASPIRATION Right     2019    BREAST CYST EXCISION Right     CATARACT EXTRACTION       SECTION      x 2    COLONOSCOPY N/A 2024    Procedure: COLONOSCOPY;  Surgeon: Yane Yancey MD;  Location: [facility] ENDO;  Service: Endoscopy;  Laterality: N/A;    ESOPHAGOGASTRODUODENOSCOPY N/A 2024    Procedure: EGD (ESOPHAGOGASTRODUODENOSCOPY);  Surgeon: Yane Yancey MD;  Location: [facility] ENDO;  Service: Endoscopy;  Laterality: N/A;    HYSTERECTOMY      SINUS SURGERY      TONSILLECTOMY         Family History   Problem Relation Name Age of Onset    Alzheimer's disease Mother      Hyperlipidemia Mother      Thyroid cancer Sister      Heart failure Sister           at 42    Hodgkin's lymphoma Sister      Breast cancer Maternal Aunt      Breast cancer Maternal Aunt      Heart disease Maternal Grandmother      Colon cancer Maternal Grandfather      Heart disease Paternal Grandmother      Diabetes Neg Hx         Review of patient's allergies indicates:   Allergen Reactions    Codeine Nausea And Vomiting    Pcn [penicillins] Rash    Sulfa (sulfonamide antibiotics) Rash       Social History     Tobacco Use    Smoking status: Never    Smokeless tobacco: Never   Substance Use Topics    Alcohol use: Yes     Comment: glass of wine every other day    Drug use: Never       Physical Exam   ECOG:   ECOG SCORE    0 - Fully active-able to carry on all pre-disease performance without restriction          Vitals:  /69   Pulse 88   Temp 97.2 °F (36.2 °C) (Temporal)   Resp 20   Ht 5' (1.524 m)   Wt 60.1 kg (132 lb 7.9 oz)   SpO2 98%   BMI 25.88 kg/m²     Physical Exam:  Physical Exam  Constitutional:       General: She is not in acute distress.     Appearance: Normal  appearance. She is not ill-appearing.   HENT:      Head: Normocephalic and atraumatic.   Eyes:      Extraocular Movements: Extraocular movements intact.      Conjunctiva/sclera: Conjunctivae normal.   Cardiovascular:      Rate and Rhythm: Normal rate and regular rhythm.      Heart sounds: No murmur heard.  Pulmonary:      Effort: Pulmonary effort is normal. No respiratory distress.      Breath sounds: Normal breath sounds. No wheezing, rhonchi or rales.   Abdominal:      General: Bowel sounds are normal. There is no distension.      Palpations: Abdomen is soft. There is no hepatomegaly, splenomegaly or mass.      Tenderness: There is no abdominal tenderness. There is no guarding.   Musculoskeletal:         General: Normal range of motion.      Cervical back: Neck supple. No rigidity or tenderness.      Right lower leg: No edema.      Left lower leg: No edema.   Lymphadenopathy:      Head:      Right side of head: No occipital adenopathy.      Left side of head: No occipital adenopathy.      Cervical: No cervical adenopathy.      Upper Body:      Right upper body: No supraclavicular or axillary adenopathy.      Left upper body: No supraclavicular or axillary adenopathy.   Skin:     Findings: No rash.   Neurological:      General: No focal deficit present.      Mental Status: She is alert and oriented to person, place, and time.   Psychiatric:         Mood and Affect: Mood normal.         Behavior: Behavior normal.         Thought Content: Thought content normal.        Labs   Labs:  No visits with results within 2 Day(s) from this visit.   Latest known visit with results is:   Lab Visit on 02/17/2025   Component Date Value Ref Range Status    Phosphorus 02/17/2025 3.6  2.7 - 4.5 mg/dL Final    Magnesium 02/17/2025 2.1  1.6 - 2.6 mg/dL Final    Sodium 02/17/2025 140  136 - 145 mmol/L Final    Potassium 02/17/2025 4.4  3.5 - 5.1 mmol/L Final    Chloride 02/17/2025 105  95 - 110 mmol/L Final    CO2 02/17/2025 25  23 -  29 mmol/L Final    Glucose 02/17/2025 149 (H)  70 - 110 mg/dL Final    BUN 02/17/2025 19  8 - 23 mg/dL Final    Creatinine 02/17/2025 1.0  0.5 - 1.4 mg/dL Final    Calcium 02/17/2025 10.2  8.7 - 10.5 mg/dL Final    Total Protein 02/17/2025 7.3  6.0 - 8.4 g/dL Final    Albumin 02/17/2025 4.5  3.5 - 5.2 g/dL Final    Total Bilirubin 02/17/2025 0.4  0.1 - 1.0 mg/dL Final    Comment: For infants and newborns, interpretation of results should be based  on gestational age, weight and in agreement with clinical  observations.    Premature Infant recommended reference ranges:  Up to 24 hours.............<8.0 mg/dL  Up to 48 hours............<12.0 mg/dL  3-5 days..................<15.0 mg/dL  6-29 days.................<15.0 mg/dL      Alkaline Phosphatase 02/17/2025 58  40 - 150 U/L Final    AST 02/17/2025 19  10 - 40 U/L Final    ALT 02/17/2025 23  10 - 44 U/L Final    eGFR 02/17/2025 59 (A)  >60 mL/min/1.73 m^2 Final    Anion Gap 02/17/2025 10  8 - 16 mmol/L Final    WBC 02/17/2025 18.24 (H)  3.90 - 12.70 K/uL Final    RBC 02/17/2025 4.47  4.00 - 5.40 M/uL Final    Hemoglobin 02/17/2025 14.2  12.0 - 16.0 g/dL Final    Hematocrit 02/17/2025 44.3  37.0 - 48.5 % Final    MCV 02/17/2025 99 (H)  82 - 98 fL Final    MCH 02/17/2025 31.8 (H)  27.0 - 31.0 pg Final    MCHC 02/17/2025 32.1  32.0 - 36.0 g/dL Final    RDW 02/17/2025 12.8  11.5 - 14.5 % Final    Platelets 02/17/2025 232  150 - 450 K/uL Final    MPV 02/17/2025 11.0  9.2 - 12.9 fL Final    Immature Granulocytes 02/17/2025 CANCELED  0.0 - 0.5 % Final    Result canceled by the ancillary.    Immature Grans (Abs) 02/17/2025 CANCELED  0.00 - 0.04 K/uL Final    Comment: Mild elevation in immature granulocytes is non specific and   can be seen in a variety of conditions including stress response,   acute inflammation, trauma and pregnancy. Correlation with other   laboratory and clinical findings is essential.    Result canceled by the ancillary.      nRBC 02/17/2025 0  0 /100  WBC Final    Gran % 02/17/2025 21.0 (L)  38.0 - 73.0 % Final    Lymph % 02/17/2025 68.0 (H)  18.0 - 48.0 % Final    Mono % 02/17/2025 11.0  4.0 - 15.0 % Final    Eosinophil % 02/17/2025 0.0  0.0 - 8.0 % Final    Basophil % 02/17/2025 0.0  0.0 - 1.9 % Final    Platelet Estimate 02/17/2025 Appears normal   Final    Aniso 02/17/2025 Slight   Final    Poik 02/17/2025 Slight   Final    Bubba Cells 02/17/2025 Occasional   Final    Differential Method 02/17/2025 Manual   Final    LD 02/17/2025 156  110 - 260 U/L Final    Results are increased in hemolyzed samples.    Ferritin 02/17/2025 64  20.0 - 300.0 ng/mL Final    Iron 02/17/2025 81  30 - 160 ug/dL Final    Transferrin 02/17/2025 256  200 - 375 mg/dL Final    TIBC 02/17/2025 379  250 - 450 ug/dL Final    Saturated Iron 02/17/2025 21  20 - 50 % Final    Pathologist Review Peripheral Smear 02/17/2025 REVIEWED   Final    Comment:   Electronically reviewed and signed by:  Qiana Wallace M.D.  Signed on 02/18/25 at 10:03  Pathologist's interpretation of peripheral smear:    White cells: Leukocytosis with absolute and relative lymphocytosis;   no blasts identified.  Red cells: Mild macrocytosis. No anemia present.   Platelets: Within normal limits.    The findings are consistent with the patient's known CLL          Imaging   NM PET CT FDG Skull Base to Mid Thigh 11/29/23  Order: [id number]  Status: Final result       Visible to patient: Yes (seen)       Next appt: 01/02/2024 at 10:20 AM in Radiology ([id number])       Dx: Abnormal CBC    0 Result Notes  Details    Reading Physician Reading Date Result Priority   Hernando Spivey MD  [phone] 11/29/2023 STAT     Narrative & Impression  EXAMINATION:  NM PET CT FDG SKULL BASE TO MID THIGH     CLINICAL HISTORY:  B-cell lymphoproliferative disorder; assess for lymphadenopathy; Other specified abnormal findings of blood chemistry     TECHNIQUE:  11.51 mCi of F18-FDG was administered intravenously in the left antecubital  fossa.  After an approximately 60 min distribution time, PET/CT images were acquired from the skull base to the mid thigh. Transmission images were acquired to correct for attenuation using a whole body low-dose CT scan without contrast with the arms positioned above the head. Glycemia at the time of injection was 147 mg/dL.     COMPARISON:  None     FINDINGS:  Quality of the study: Adequate.     SUV max of the liver parenchyma is 2.6.  SUV max of mediastinal blood pool is 2.1.     Neck: No lymphadenopathy.  No mass or abnormal FDG avidity.     Chest: No FDG avid mediastinal, hilar, or axillary lymphadenopathy.  Clear lungs.  No pleural effusion.     Abdomen/pelvis: No ascites or lymphadenopathy or mass.  No abnormal FDG avidity.     Skeletal structures: No abnormal FDG avidity.  No focal lytic or sclerotic lesion.     Physiologic uptake of the tracer is present within the brain, salivary glands, myocardium, GI and  tracts.     Incidental CT findings: N/A     Impression:     No lymphadenopathy or abnormal FDG avidity on this exam.  Normal PET-CT.     All CT scans at this facility are performed  using dose modulation techniques as appropriate to performed exam including the following:  automated exposure control; adjustment of mA and/or kV according to the patients size (this includes techniques or standardized protocols for targeted exams where dose is matched to indication/reason for exam: i.e. extremities or head);  iterative reconstruction technique.         Assessment and Plan   CLL - Clinical Stage A  CLL-IPI Intermediate Risk (trisomy 12 in 50% of  nuclei and a 13q deletion of both chromosomes 13 in 49% of   nuclei. )  CLL FISH 11/17/23 confirmed the diagnosis; will defer bone marrow biopsy at this time  PET-CT showed no LAD   I discussed the diagnosis, indications for treatment, and significant side effects of BTK inhibitors; I provided her with and informational handout on her diagnosis  Labs reviewed and  stable  No indication for treatment at this time (she has mild fatigue that is not function limiting and is imiproving)      Cancer Screening  MMG: Scheduled  PAP Smear: Hx hysterectomy  Colonoscopy 05/24/14: at [facility]; random bx taken for microscopic colitis and negative; colon normal; repeat 10 years - Refer to GI for C-scope and EGD given change in bowel habits as well as new UGI sx      Chronic Medical Conditions  CKD IIIa - stable; continue to monitor CMP  DM II  HLD  GERD  IBS    Med Onc Route Chart for Scheduling       The patient was seen, interviewed and examined. Pertinent lab and radiologic studies were reviewed. Pt instructed to call should they develop concerning signs/symptoms or have further questions.        Portions of the record may have been created with voice recognition software. Occasional wrong-word or sound-a-like substitutions may have occurred due to the inherent limitations of voice recognition software. Read the chart carefully and recognize, using context, where substitutions have occurred.      Jess Verdin MD    Hematology/Oncology

## 2025-02-27 ENCOUNTER — PATIENT MESSAGE (OUTPATIENT)
Dept: INTERNAL MEDICINE | Facility: CLINIC | Age: 75
End: 2025-02-27
Payer: MEDICARE

## 2025-02-27 DIAGNOSIS — E78.5 DYSLIPIDEMIA ASSOCIATED WITH TYPE 2 DIABETES MELLITUS: ICD-10-CM

## 2025-02-27 DIAGNOSIS — E11.69 DYSLIPIDEMIA ASSOCIATED WITH TYPE 2 DIABETES MELLITUS: ICD-10-CM

## 2025-02-27 DIAGNOSIS — E11.9 CONTROLLED TYPE 2 DIABETES MELLITUS WITHOUT COMPLICATION, WITHOUT LONG-TERM CURRENT USE OF INSULIN: Primary | ICD-10-CM

## 2025-03-17 ENCOUNTER — PATIENT MESSAGE (OUTPATIENT)
Dept: HEMATOLOGY/ONCOLOGY | Facility: CLINIC | Age: 75
End: 2025-03-17
Payer: MEDICARE

## 2025-03-17 ENCOUNTER — LAB VISIT (OUTPATIENT)
Dept: LAB | Facility: HOSPITAL | Age: 75
End: 2025-03-17
Attending: FAMILY MEDICINE
Payer: MEDICARE

## 2025-03-17 DIAGNOSIS — E11.69 DYSLIPIDEMIA ASSOCIATED WITH TYPE 2 DIABETES MELLITUS: ICD-10-CM

## 2025-03-17 DIAGNOSIS — E11.9 CONTROLLED TYPE 2 DIABETES MELLITUS WITHOUT COMPLICATION, WITHOUT LONG-TERM CURRENT USE OF INSULIN: ICD-10-CM

## 2025-03-17 DIAGNOSIS — E78.5 DYSLIPIDEMIA ASSOCIATED WITH TYPE 2 DIABETES MELLITUS: ICD-10-CM

## 2025-03-17 LAB
CHOLEST SERPL-MCNC: 169 MG/DL (ref 120–199)
CHOLEST/HDLC SERPL: 2.6 {RATIO} (ref 2–5)
ESTIMATED AVG GLUCOSE: 143 MG/DL (ref 68–131)
HBA1C MFR BLD: 6.6 % (ref 4–5.6)
HDLC SERPL-MCNC: 64 MG/DL (ref 40–75)
HDLC SERPL: 37.9 % (ref 20–50)
LDLC SERPL CALC-MCNC: 79 MG/DL (ref 63–159)
NONHDLC SERPL-MCNC: 105 MG/DL
TRIGL SERPL-MCNC: 130 MG/DL (ref 30–150)
TSH SERPL DL<=0.005 MIU/L-ACNC: 2.92 UIU/ML (ref 0.4–4)

## 2025-03-17 PROCEDURE — 80061 LIPID PANEL: CPT | Performed by: FAMILY MEDICINE

## 2025-03-17 PROCEDURE — 83036 HEMOGLOBIN GLYCOSYLATED A1C: CPT | Performed by: FAMILY MEDICINE

## 2025-03-17 PROCEDURE — 84443 ASSAY THYROID STIM HORMONE: CPT | Performed by: FAMILY MEDICINE

## 2025-03-17 PROCEDURE — 36415 COLL VENOUS BLD VENIPUNCTURE: CPT | Mod: PO | Performed by: FAMILY MEDICINE

## 2025-03-18 ENCOUNTER — RESULTS FOLLOW-UP (OUTPATIENT)
Dept: INTERNAL MEDICINE | Facility: CLINIC | Age: 75
End: 2025-03-18

## 2025-03-19 ENCOUNTER — OFFICE VISIT (OUTPATIENT)
Dept: INTERNAL MEDICINE | Facility: CLINIC | Age: 75
End: 2025-03-19
Payer: MEDICARE

## 2025-03-19 ENCOUNTER — RESULTS FOLLOW-UP (OUTPATIENT)
Dept: INTERNAL MEDICINE | Facility: CLINIC | Age: 75
End: 2025-03-19

## 2025-03-19 VITALS
OXYGEN SATURATION: 98 % | RESPIRATION RATE: 18 BRPM | DIASTOLIC BLOOD PRESSURE: 66 MMHG | BODY MASS INDEX: 26.44 KG/M2 | HEIGHT: 60 IN | HEART RATE: 86 BPM | TEMPERATURE: 98 F | WEIGHT: 134.69 LBS | SYSTOLIC BLOOD PRESSURE: 126 MMHG

## 2025-03-19 DIAGNOSIS — K21.9 GASTROESOPHAGEAL REFLUX DISEASE, UNSPECIFIED WHETHER ESOPHAGITIS PRESENT: ICD-10-CM

## 2025-03-19 DIAGNOSIS — E11.69 DYSLIPIDEMIA ASSOCIATED WITH TYPE 2 DIABETES MELLITUS: ICD-10-CM

## 2025-03-19 DIAGNOSIS — E78.5 DYSLIPIDEMIA ASSOCIATED WITH TYPE 2 DIABETES MELLITUS: ICD-10-CM

## 2025-03-19 DIAGNOSIS — F41.9 INSOMNIA SECONDARY TO ANXIETY: ICD-10-CM

## 2025-03-19 DIAGNOSIS — C91.10 CHRONIC LYMPHOCYTIC LEUKEMIA (CLL), CLINICAL STAGE A: ICD-10-CM

## 2025-03-19 DIAGNOSIS — F51.05 INSOMNIA SECONDARY TO ANXIETY: ICD-10-CM

## 2025-03-19 DIAGNOSIS — E11.9 TYPE 2 DIABETES MELLITUS WITHOUT COMPLICATION, WITHOUT LONG-TERM CURRENT USE OF INSULIN: Primary | ICD-10-CM

## 2025-03-19 PROBLEM — R30.0 DYSURIA: Status: RESOLVED | Noted: 2024-09-04 | Resolved: 2025-03-19

## 2025-03-19 PROBLEM — N89.8 VAGINAL DISCHARGE: Status: RESOLVED | Noted: 2024-09-04 | Resolved: 2025-03-19

## 2025-03-19 LAB
BACTERIA #/AREA URNS HPF: NORMAL /HPF
BILIRUB UR QL STRIP: NEGATIVE
CLARITY UR: CLEAR
COLOR UR: YELLOW
GLUCOSE UR QL STRIP: ABNORMAL
HGB UR QL STRIP: NEGATIVE
KETONES UR QL STRIP: NEGATIVE
LEUKOCYTE ESTERASE UR QL STRIP: NEGATIVE
MICROSCOPIC COMMENT: NORMAL
NITRITE UR QL STRIP: NEGATIVE
PH UR STRIP: 6 [PH] (ref 5–8)
PROT UR QL STRIP: NEGATIVE
SP GR UR STRIP: 1.01 (ref 1–1.03)
URN SPEC COLLECT METH UR: ABNORMAL
WBC #/AREA URNS HPF: 1 /HPF (ref 0–5)
YEAST URNS QL MICRO: NORMAL

## 2025-03-19 PROCEDURE — 3072F LOW RISK FOR RETINOPATHY: CPT | Mod: CPTII,S$GLB,, | Performed by: FAMILY MEDICINE

## 2025-03-19 PROCEDURE — 1126F AMNT PAIN NOTED NONE PRSNT: CPT | Mod: CPTII,S$GLB,, | Performed by: FAMILY MEDICINE

## 2025-03-19 PROCEDURE — 3074F SYST BP LT 130 MM HG: CPT | Mod: CPTII,S$GLB,, | Performed by: FAMILY MEDICINE

## 2025-03-19 PROCEDURE — 1100F PTFALLS ASSESS-DOCD GE2>/YR: CPT | Mod: CPTII,S$GLB,, | Performed by: FAMILY MEDICINE

## 2025-03-19 PROCEDURE — G2211 COMPLEX E/M VISIT ADD ON: HCPCS | Mod: S$GLB,,, | Performed by: FAMILY MEDICINE

## 2025-03-19 PROCEDURE — 99214 OFFICE O/P EST MOD 30 MIN: CPT | Mod: S$GLB,,, | Performed by: FAMILY MEDICINE

## 2025-03-19 PROCEDURE — 1159F MED LIST DOCD IN RCRD: CPT | Mod: CPTII,S$GLB,, | Performed by: FAMILY MEDICINE

## 2025-03-19 PROCEDURE — 3008F BODY MASS INDEX DOCD: CPT | Mod: CPTII,S$GLB,, | Performed by: FAMILY MEDICINE

## 2025-03-19 PROCEDURE — 3078F DIAST BP <80 MM HG: CPT | Mod: CPTII,S$GLB,, | Performed by: FAMILY MEDICINE

## 2025-03-19 PROCEDURE — 81000 URINALYSIS NONAUTO W/SCOPE: CPT | Performed by: FAMILY MEDICINE

## 2025-03-19 PROCEDURE — 3044F HG A1C LEVEL LT 7.0%: CPT | Mod: CPTII,S$GLB,, | Performed by: FAMILY MEDICINE

## 2025-03-19 PROCEDURE — 99999 PR PBB SHADOW E&M-EST. PATIENT-LVL III: CPT | Mod: PBBFAC,,, | Performed by: FAMILY MEDICINE

## 2025-03-19 PROCEDURE — 3288F FALL RISK ASSESSMENT DOCD: CPT | Mod: CPTII,S$GLB,, | Performed by: FAMILY MEDICINE

## 2025-03-19 NOTE — PROGRESS NOTES
Subjective:       Patient ID: Josiah Perkins is a 74 y.o. female presents with Problem List[1]     Chief Complaint: Follow-up    74-year-old female patient with Patient Active Problem List:     Type 2 diabetes mellitus without complication, without long-term current use of insulin     Dyslipidemia associated with type 2 diabetes mellitus     Chronic lymphocytic leukemia (CLL), clinical stage A     Gastroesophageal reflux disease     Insomnia secondary to anxiety     Genitourinary syndrome of menopause  Here for follow-up on recent labs, has been taking her medications regularly but reports that she has discontinued metformin for the past 1 month secondary to GI side effects with diarrhea  Patient reduced to have frequent vaginal discharge, has been taking Jardiance as recommended by Cardiology  Denies of any chest tightness difficulty breathing with palpitations    Follow-up  Pertinent negatives include no abdominal pain, chest pain, fatigue, headaches, myalgias, nausea, numbness, rash, vomiting or weakness.     Review of Systems   Constitutional:  Negative for fatigue.   Eyes:  Negative for visual disturbance.   Respiratory:  Negative for shortness of breath.    Cardiovascular:  Negative for chest pain and leg swelling.   Gastrointestinal:  Negative for abdominal pain, nausea and vomiting.   Endocrine: Negative for polydipsia and polyuria.   Genitourinary:  Positive for vaginal discharge. Negative for dysuria, frequency and urgency.   Musculoskeletal:  Negative for myalgias.   Skin:  Negative for rash.   Neurological:  Negative for weakness, light-headedness, numbness and headaches.   Psychiatric/Behavioral:  Negative for sleep disturbance.          /66 (BP Location: Right arm, Patient Position: Sitting)   Pulse 86   Temp 97.9 °F (36.6 °C) (Tympanic)   Resp 18   Ht 5' (1.524 m)   Wt 61.1 kg (134 lb 11.2 oz)   SpO2 98%   BMI 26.31 kg/m²   Objective:      Physical Exam  Constitutional:       Appearance: She  is well-developed.   HENT:      Head: Normocephalic and atraumatic.   Cardiovascular:      Rate and Rhythm: Normal rate and regular rhythm.      Pulses:           Dorsalis pedis pulses are 2+ on the right side and 2+ on the left side.      Heart sounds: Normal heart sounds. No murmur heard.  Pulmonary:      Effort: Pulmonary effort is normal.      Breath sounds: Normal breath sounds. No wheezing.   Abdominal:      General: Bowel sounds are normal.      Palpations: Abdomen is soft.      Tenderness: There is no abdominal tenderness.   Feet:      Right foot:      Protective Sensation: 10 sites tested.  10 sites sensed.      Left foot:      Protective Sensation: 10 sites tested.  10 sites sensed.   Skin:     General: Skin is warm and dry.      Findings: No rash.   Neurological:      Mental Status: She is alert and oriented to person, place, and time.   Psychiatric:         Mood and Affect: Mood normal.         Lab Visit on 03/17/2025   Component Date Value Ref Range Status    Cholesterol 03/17/2025 169  120 - 199 mg/dL Final    Comment: The National Cholesterol Education Program (NCEP) has set the  following guidelines (reference ranges) for Cholesterol:  Optimal.....................<200 mg/dL  Borderline High.............200-239 mg/dL  High........................> or = 240 mg/dL      Triglycerides 03/17/2025 130  30 - 150 mg/dL Final    Comment: The National Cholesterol Education Program (NCEP) has set the  following guidelines (reference values) for triglycerides:  Normal......................<150 mg/dL  Borderline High.............150-199 mg/dL  High........................200-499 mg/dL      HDL 03/17/2025 64  40 - 75 mg/dL Final    Comment: The National Cholesterol Education Program (NCEP) has set the  following guidelines (reference values) for HDL Cholesterol:  Low...............<40 mg/dL  Optimal...........>60 mg/dL      LDL Cholesterol 03/17/2025 79.0  63.0 - 159.0 mg/dL Final    Comment: The National  Cholesterol Education Program (NCEP) has set the  following guidelines (reference values) for LDL Cholesterol:  Optimal.......................<130 mg/dL  Borderline High...............130-159 mg/dL  High..........................160-189 mg/dL  Very High.....................>190 mg/dL      HDL/Cholesterol Ratio 03/17/2025 37.9  20.0 - 50.0 % Final    Total Cholesterol/HDL Ratio 03/17/2025 2.6  2.0 - 5.0 Final    Non-HDL Cholesterol 03/17/2025 105  mg/dL Final    Comment: Risk category and Non-HDL cholesterol goals:  Coronary heart disease (CHD)or equivalent (10-year risk of CHD >20%):  Non-HDL cholesterol goal     <130 mg/dL  Two or more CHD risk factors and 10-year risk of CHD <= 20%:  Non-HDL cholesterol goal     <160 mg/dL  0 to 1 CHD risk factor:  Non-HDL cholesterol goal     <190 mg/dL      TSH 03/17/2025 2.921  0.400 - 4.000 uIU/mL Final    Hemoglobin A1C 03/17/2025 6.6 (H)  4.0 - 5.6 % Final    Comment: ADA Screening Guidelines:  5.7-6.4%  Consistent with prediabetes  >or=6.5%  Consistent with diabetes    High levels of fetal hemoglobin interfere with the HbA1C  assay. Heterozygous hemoglobin variants (HbS, HgC, etc)do  not significantly interfere with this assay.   However, presence of multiple variants may affect accuracy.      Estimated Avg Glucose 03/17/2025 143 (H)  68 - 131 mg/dL Final       Assessment/Plan:   1. Type 2 diabetes mellitus without complication, without long-term current use of insulin  -     Urinalysis, Reflex to Urine Culture; Future; Expected date: 03/19/2025  Reviewed recent labs showing stable A1c  Currently discontinue metformin for the past 1 month and has been taking Jardiance 10 mg daily  Diabetic foot exam stable today  Strict lifestyle changes recommended with 1800 ADA low-fat and low-cholesterol diet and exercise 30 minutes daily  Secondary to frequent vaginal discharge infections will get urine test, could be secondary to Jardiance will continue to monitor  Encouraged to make  an appointment with gynecologist to discuss about frequent vaginal discharge infections    2. Dyslipidemia associated with type 2 diabetes mellitus  Currently taking Crestor 20 mg daily    3. Chronic lymphocytic leukemia (CLL), clinical stage A  Followed by Hematology/Oncology    4. Gastroesophageal reflux disease, unspecified whether esophagitis present  Clinically doing well on omeprazole 20 mg daily    5. Insomnia secondary to anxiety  Stable on trazodone    Visit today included increased complexity associated with the care of the episodic problem  addressed and managing the longitudinal care of the patient due to the serious and/or complex managed problem(s) .                   [1]   Patient Active Problem List  Diagnosis    Type 2 diabetes mellitus without complication, without long-term current use of insulin    Dyslipidemia associated with type 2 diabetes mellitus    Chronic lymphocytic leukemia (CLL), clinical stage A    Gastroesophageal reflux disease    Insomnia secondary to anxiety    Genitourinary syndrome of menopause

## 2025-03-20 ENCOUNTER — TELEPHONE (OUTPATIENT)
Dept: PHARMACY | Facility: CLINIC | Age: 75
End: 2025-03-20
Payer: MEDICARE

## 2025-03-20 ENCOUNTER — PATIENT MESSAGE (OUTPATIENT)
Dept: INFUSION THERAPY | Facility: HOSPITAL | Age: 75
End: 2025-03-20
Payer: MEDICARE

## 2025-03-20 NOTE — TELEPHONE ENCOUNTER
[facility]/Population Health Chart Review & Patient Outreach Details For Medication Adherence Project    Reason for Outreach Encounter: 3rd Party payor non-compliance report ([employer], [employer], [employer], etc)  2.  Patient Outreach Method: Reviewed patient chart   3.   Medication in question:    Diabetes Medications              empagliflozin (JARDIANCE) 10 mg tablet Take 1 tablet (10 mg total) by mouth once daily.              Hyperlipidemia Medications              rosuvastatin (CRESTOR) 20 MG tablet Take 1 tablet (20 mg total) by mouth once daily.                  Jardiance LF 30 ds 3/1/25  Rosuvastatin LF 90 ds 3/15/25    4.  Reviewed and or Updates Made To: Patient Chart  5. Outreach Outcomes and/or actions taken: Patient filled medication and is on track to be adherent  Additional Notes:

## 2025-03-25 ENCOUNTER — PATIENT MESSAGE (OUTPATIENT)
Dept: DERMATOLOGY | Facility: CLINIC | Age: 75
End: 2025-03-25
Payer: MEDICARE

## 2025-04-25 PROBLEM — N89.8 VAGINAL IRRITATION: Status: ACTIVE | Noted: 2025-04-25

## 2025-04-25 PROBLEM — N95.2 VAGINAL ATROPHY: Status: ACTIVE | Noted: 2025-04-25

## 2025-04-28 ENCOUNTER — PATIENT MESSAGE (OUTPATIENT)
Dept: ADMINISTRATIVE | Facility: OTHER | Age: 75
End: 2025-04-28
Payer: MEDICARE

## 2025-04-29 ENCOUNTER — LAB VISIT (OUTPATIENT)
Dept: LAB | Facility: HOSPITAL | Age: 75
End: 2025-04-29
Attending: STUDENT IN AN ORGANIZED HEALTH CARE EDUCATION/TRAINING PROGRAM
Payer: MEDICARE

## 2025-04-29 DIAGNOSIS — N89.8 VAGINAL IRRITATION: ICD-10-CM

## 2025-04-29 PROCEDURE — 81001 URINALYSIS AUTO W/SCOPE: CPT

## 2025-04-30 LAB
BACTERIA #/AREA URNS AUTO: NORMAL /HPF
BILIRUB UR QL STRIP.AUTO: NEGATIVE
CLARITY UR: CLEAR
COLOR UR AUTO: YELLOW
GLUCOSE UR QL STRIP: ABNORMAL
HGB UR QL STRIP: NEGATIVE
KETONES UR QL STRIP: NEGATIVE
LEUKOCYTE ESTERASE UR QL STRIP: NEGATIVE
MICROSCOPIC COMMENT: NORMAL
NITRITE UR QL STRIP: NEGATIVE
PH UR STRIP: 5 [PH]
PROT UR QL STRIP: NEGATIVE
RBC #/AREA URNS AUTO: 1 /HPF (ref 0–4)
SP GR UR STRIP: 1.02
SQUAMOUS #/AREA URNS AUTO: <1 /HPF
UROBILINOGEN UR STRIP-ACNC: NEGATIVE EU/DL
WBC #/AREA URNS AUTO: 1 /HPF (ref 0–5)
YEAST UR QL AUTO: NORMAL /HPF

## 2025-05-05 ENCOUNTER — PATIENT MESSAGE (OUTPATIENT)
Dept: ADMINISTRATIVE | Facility: OTHER | Age: 75
End: 2025-05-05
Payer: MEDICARE

## 2025-05-05 DIAGNOSIS — L70.0 CYSTIC ACNE VULGARIS: ICD-10-CM

## 2025-05-05 PROBLEM — B37.9 YEAST INFECTION: Status: ACTIVE | Noted: 2025-05-05

## 2025-05-07 RX ORDER — CLINDAMYCIN PHOSPHATE 11.9 MG/ML
SOLUTION TOPICAL
Qty: 30 ML | Refills: 2 | Status: SHIPPED | OUTPATIENT
Start: 2025-05-07

## 2025-06-06 ENCOUNTER — PATIENT MESSAGE (OUTPATIENT)
Dept: HEMATOLOGY/ONCOLOGY | Facility: CLINIC | Age: 75
End: 2025-06-06
Payer: MEDICARE

## 2025-06-07 DIAGNOSIS — F51.05 INSOMNIA SECONDARY TO ANXIETY: ICD-10-CM

## 2025-06-07 DIAGNOSIS — F41.9 INSOMNIA SECONDARY TO ANXIETY: ICD-10-CM

## 2025-06-07 NOTE — TELEPHONE ENCOUNTER
No care due was identified.  [facility] Embedded Care Due Messages. Reference number: [id number].   6/07/2025 4:32:10 PM CDT

## 2025-06-08 RX ORDER — TRAZODONE HYDROCHLORIDE 100 MG/1
100 TABLET ORAL NIGHTLY
Qty: 90 TABLET | Refills: 3 | Status: SHIPPED | OUTPATIENT
Start: 2025-06-08

## 2025-06-08 NOTE — TELEPHONE ENCOUNTER
Refill Decision Note   Josiah Perkins  is requesting a refill authorization.  Brief Assessment and Rationale for Refill:  Approve     Medication Therapy Plan:        Comments:     Note composed:2:23 PM 06/08/2025

## 2025-06-09 ENCOUNTER — LAB VISIT (OUTPATIENT)
Dept: LAB | Facility: HOSPITAL | Age: 75
End: 2025-06-09
Payer: MEDICARE

## 2025-06-09 DIAGNOSIS — R79.89 ABNORMAL CBC: ICD-10-CM

## 2025-06-09 DIAGNOSIS — C91.10 CHRONIC LYMPHOCYTIC LEUKEMIA (CLL), CLINICAL STAGE A: ICD-10-CM

## 2025-06-09 DIAGNOSIS — C91.10 CLL (CHRONIC LYMPHOCYTIC LEUKEMIA): ICD-10-CM

## 2025-06-09 LAB
ABSOLUTE NEUTROPHIL MANUAL (OHS): 1.7 K/UL
ALBUMIN SERPL BCP-MCNC: 4.4 G/DL (ref 3.5–5.2)
ALP SERPL-CCNC: 47 UNIT/L (ref 40–150)
ALT SERPL W/O P-5'-P-CCNC: 18 UNIT/L (ref 10–44)
ANION GAP (OHS): 9 MMOL/L (ref 8–16)
AST SERPL-CCNC: 21 UNIT/L (ref 11–45)
BILIRUB SERPL-MCNC: 0.5 MG/DL (ref 0.1–1)
BUN SERPL-MCNC: 16 MG/DL (ref 8–23)
CALCIUM SERPL-MCNC: 9.5 MG/DL (ref 8.7–10.5)
CHLORIDE SERPL-SCNC: 104 MMOL/L (ref 95–110)
CO2 SERPL-SCNC: 27 MMOL/L (ref 23–29)
CREAT SERPL-MCNC: 1 MG/DL (ref 0.5–1.4)
EOSINOPHIL NFR BLD MANUAL: 1 % (ref 0–8)
ERYTHROCYTE [DISTWIDTH] IN BLOOD BY AUTOMATED COUNT: 13.2 % (ref 11.5–14.5)
GFR SERPLBLD CREATININE-BSD FMLA CKD-EPI: 59 ML/MIN/1.73/M2
GLUCOSE SERPL-MCNC: 166 MG/DL (ref 70–110)
HCT VFR BLD AUTO: 45 % (ref 37–48.5)
HGB BLD-MCNC: 14.2 GM/DL (ref 12–16)
LYMPHOCYTES NFR BLD MANUAL: 71 % (ref 18–48)
MCH RBC QN AUTO: 30.7 PG (ref 27–31)
MCHC RBC AUTO-ENTMCNC: 31.6 G/DL (ref 32–36)
MCV RBC AUTO: 97 FL (ref 82–98)
MONOCYTES NFR BLD MANUAL: 18 % (ref 4–15)
NEUTROPHILS NFR BLD MANUAL: 10 % (ref 38–73)
NUCLEATED RBC (/100WBC) (OHS): 0 /100 WBC
PLATELET # BLD AUTO: 198 K/UL (ref 150–450)
PLATELET BLD QL SMEAR: ABNORMAL
PMV BLD AUTO: 11.6 FL (ref 9.2–12.9)
POTASSIUM SERPL-SCNC: 4.3 MMOL/L (ref 3.5–5.1)
PROT SERPL-MCNC: 7.2 GM/DL (ref 6–8.4)
RBC # BLD AUTO: 4.62 M/UL (ref 4–5.4)
SODIUM SERPL-SCNC: 140 MMOL/L (ref 136–145)
WBC # BLD AUTO: 17.28 K/UL (ref 3.9–12.7)

## 2025-06-09 PROCEDURE — 85025 COMPLETE CBC W/AUTO DIFF WBC: CPT

## 2025-06-09 PROCEDURE — 36415 COLL VENOUS BLD VENIPUNCTURE: CPT | Mod: PO

## 2025-06-09 PROCEDURE — 82374 ASSAY BLOOD CARBON DIOXIDE: CPT

## 2025-06-10 LAB — PATHOLOGIST REVIEW - CBC/DIFF (OHS): NORMAL

## 2025-06-11 ENCOUNTER — OFFICE VISIT (OUTPATIENT)
Dept: HEMATOLOGY/ONCOLOGY | Facility: CLINIC | Age: 75
End: 2025-06-11
Payer: MEDICARE

## 2025-06-11 VITALS
DIASTOLIC BLOOD PRESSURE: 66 MMHG | BODY MASS INDEX: 26.32 KG/M2 | WEIGHT: 134.06 LBS | SYSTOLIC BLOOD PRESSURE: 101 MMHG | HEART RATE: 77 BPM | OXYGEN SATURATION: 98 % | HEIGHT: 60 IN

## 2025-06-11 DIAGNOSIS — C91.10 CHRONIC LYMPHOCYTIC LEUKEMIA (CLL), CLINICAL STAGE A: Primary | ICD-10-CM

## 2025-06-11 PROCEDURE — 99999 PR PBB SHADOW E&M-EST. PATIENT-LVL III: CPT | Mod: PBBFAC,,, | Performed by: INTERNAL MEDICINE

## 2025-06-11 NOTE — PROGRESS NOTES
Patient ID: Josiah Perkins   Reason for Visit: Follow-up (CLL)  MRN:  [id number]     Oncologic Diagnosis: CLL  Current Treatment: Surveillance  Subjective   Ms. Perkins presents for follow up and to review her lab results.     Since our last visit she has been doing well physically.  She  is under some stress personally. She has no B symptoms and her chronic symptoms remain unchanged.  Labs reviewed and stable.    Review of Systems   Constitutional:  Negative for activity change, chills, diaphoresis, fatigue, fever and unexpected weight change.   Eyes:  Negative for visual disturbance.   Respiratory:  Negative for shortness of breath.    Cardiovascular:  Negative for chest pain.   Gastrointestinal:  Negative for abdominal distention, abdominal pain, blood in stool, constipation, diarrhea, nausea and vomiting.   Genitourinary:  Positive for frequency. Negative for difficulty urinating and hematuria.   Musculoskeletal:  Negative for back pain and myalgias.   Skin:  Negative for rash.   Neurological:  Negative for light-headedness and headaches.   Hematological:  Negative for adenopathy. Does not bruise/bleed easily.   Psychiatric/Behavioral:  The patient is not nervous/anxious.      History     Oncology History   Chronic lymphocytic leukemia (CLL), clinical stage A   12/22/2023 Initial Diagnosis    Chronic lymphocytic leukemia (CLL), clinical stage A     12/22/2023 Cancer Staged    Staging form: Chronic Lymphocytic Leukemia / Small Lymphocytic Lymphoma, AJCC 8th Edition  - Clinical stage from 12/22/2023: Modified Stover Stage 0 (Modified Stover risk: Low, Binet: Stage A, Lymphocytosis: Present, Adenopathy: Absent, Organomegaly: Absent, Anemia: Absent, Thrombocytopenia: Absent)         Past Medical History:   Diagnosis Date    CLL (chronic lymphocytic leukemia)     Hemorrhoids     Type 2 diabetes mellitus without complications        Past Surgical History:   Procedure Laterality Date    ANGIOGRAM, CORONARY, WITH LEFT HEART  CATHETERIZATION N/A 2024    Procedure: Angiogram, Coronary, with Left Heart Cath;  Surgeon: Mahogany Ross MD;  Location: [facility] CATH LAB;  Service: Cardiology;  Laterality: N/A;    CATARACT EXTRACTION      COLONOSCOPY N/A 2024    Procedure: COLONOSCOPY;  Surgeon: Yane Yancey MD;  Location: [facility] ENDO;  Service: Endoscopy;  Laterality: N/A;    ESOPHAGOGASTRODUODENOSCOPY N/A 2024    Procedure: EGD (ESOPHAGOGASTRODUODENOSCOPY);  Surgeon: Yane Yancey MD;  Location: [facility] ENDO;  Service: Endoscopy;  Laterality: N/A;    SINUS SURGERY         Family History   Problem Relation Name Age of Onset    Alzheimer's disease Mother      Hyperlipidemia Mother      Thyroid cancer Sister      Heart failure Sister           at 42    Hodgkin's lymphoma Sister      Breast cancer Maternal Aunt      Breast cancer Maternal Aunt      Heart disease Maternal Grandmother      Colon cancer Maternal Grandfather      Heart disease Paternal Grandmother      Diabetes Neg Hx         Review of patient's allergies indicates:   Allergen Reactions    Codeine Nausea And Vomiting    Pcn [penicillins] Rash    Sulfa (sulfonamide antibiotics) Rash       Social History     Tobacco Use    Smoking status: Never    Smokeless tobacco: Never   Substance Use Topics    Alcohol use: Yes     Comment: glass of wine every other day    Drug use: Never       Physical Exam   ECOG:   ECOG SCORE    0 - Fully active-able to carry on all pre-disease performance without restriction          Vitals:  /66 (BP Location: Right arm, Patient Position: Sitting)   Pulse 77   Ht 5' (1.524 m)   Wt 60.8 kg (134 lb 0.6 oz)   SpO2 98%   BMI 26.18 kg/m²       Physical Exam  Constitutional:       General: She is not in acute distress.     Appearance: Normal appearance. She is not ill-appearing.   HENT:      Head: Normocephalic and atraumatic.   Eyes:      Extraocular Movements: Extraocular movements intact.      Conjunctiva/sclera: Conjunctivae  normal.   Cardiovascular:      Rate and Rhythm: Normal rate and regular rhythm.      Heart sounds: No murmur heard.  Pulmonary:      Effort: Pulmonary effort is normal. No respiratory distress.      Breath sounds: Normal breath sounds. No wheezing, rhonchi or rales.   Abdominal:      General: Bowel sounds are normal. There is no distension.      Palpations: Abdomen is soft. There is no hepatomegaly, splenomegaly or mass.      Tenderness: There is no abdominal tenderness. There is no guarding.   Musculoskeletal:         General: Normal range of motion.      Cervical back: Neck supple. No rigidity or tenderness.      Right lower leg: No edema.      Left lower leg: No edema.   Lymphadenopathy:      Head:      Right side of head: No occipital adenopathy.      Left side of head: No occipital adenopathy.      Cervical: No cervical adenopathy.      Upper Body:      Right upper body: No supraclavicular or axillary adenopathy.      Left upper body: No supraclavicular or axillary adenopathy.   Skin:     Findings: No rash.   Neurological:      General: No focal deficit present.      Mental Status: She is alert and oriented to person, place, and time.   Psychiatric:         Mood and Affect: Mood normal.         Behavior: Behavior normal.         Thought Content: Thought content normal.        Labs   Labs:  No visits with results within 2 Day(s) from this visit.   Latest known visit with results is:   Lab Visit on 06/09/2025   Component Date Value Ref Range Status    Sodium 06/09/2025 140  136 - 145 mmol/L Final    Potassium 06/09/2025 4.3  3.5 - 5.1 mmol/L Final    Chloride 06/09/2025 104  95 - 110 mmol/L Final    CO2 06/09/2025 27  23 - 29 mmol/L Final    Glucose 06/09/2025 166 (H)  70 - 110 mg/dL Final    BUN 06/09/2025 16  8 - 23 mg/dL Final    Creatinine 06/09/2025 1.0  0.5 - 1.4 mg/dL Final    Calcium 06/09/2025 9.5  8.7 - 10.5 mg/dL Final    Protein Total 06/09/2025 7.2  6.0 - 8.4 gm/dL Final    Albumin 06/09/2025 4.4   3.5 - 5.2 g/dL Final    Bilirubin Total 06/09/2025 0.5  0.1 - 1.0 mg/dL Final    For infants and newborns, interpretation of results should be based   on gestational age, weight and in agreement with clinical   observations.    Premature Infant recommended reference ranges:   0-24 hours:  <8.0 mg/dL   24-48 hours: <12.0 mg/dL   3-5 days:    <15.0 mg/dL   6-29 days:   <15.0 mg/dL    ALP 06/09/2025 47  40 - 150 unit/L Final    AST 06/09/2025 21  11 - 45 unit/L Final    ALT 06/09/2025 18  10 - 44 unit/L Final    Anion Gap 06/09/2025 9  8 - 16 mmol/L Final    eGFR 06/09/2025 59 (L)  >60 mL/min/1.73/m2 Final    Estimated GFR calculated using the CKD-EPI creatinine (2021) equation.    WBC 06/09/2025 17.28 (H)  3.90 - 12.70 K/uL Final    RBC 06/09/2025 4.62  4.00 - 5.40 M/uL Final    HGB 06/09/2025 14.2  12.0 - 16.0 gm/dL Final    HCT 06/09/2025 45.0  37.0 - 48.5 % Final    MCV 06/09/2025 97  82 - 98 fL Final    MCH 06/09/2025 30.7  27.0 - 31.0 pg Final    MCHC 06/09/2025 31.6 (L)  32.0 - 36.0 g/dL Final    RDW 06/09/2025 13.2  11.5 - 14.5 % Final    Platelet Count 06/09/2025 198  150 - 450 K/uL Final    MPV 06/09/2025 11.6  9.2 - 12.9 fL Final    Nucleated RBC 06/09/2025 0  <=0 /100 WBC Final    Gran # (ANC) 06/09/2025 1.7  K/uL Final    Segmented Neutrophil % 06/09/2025 10.0 (L)  38.0 - 73.0 % Final    Lymphocyte % 06/09/2025 71.0 (H)  18.0 - 48.0 % Final    Monocyte % 06/09/2025 18.0 (H)  4.0 - 15.0 % Final    Eosinophil % 06/09/2025 1.0  0.0 - 8.0 % Final    Platelet Estimate 06/09/2025 Appears Normal    Final    PATHOLOGIST REVIEW - CBC/DIFF ([facility]) 06/09/2025    Final                    Value:Pathologist's interpretation of peripheral smear:     White cells: Leukocytosis with absolute and relative lymphocytosis; no blasts identified.  Red cells:  Normocytic, normochromic red blood cells.  No anemia present.  Platelets:  Within normal limits.     The findings are consistent with the patient's known  CLL.        Interpreting Pathologist: Qiana Wallace MD            Imaging   NM PET CT FDG Skull Base to Mid Thigh 11/29/23  Order: [id number]  Status: Final result       Visible to patient: Yes (seen)       Next appt: 01/02/2024 at 10:20 AM in Radiology ([id number])       Dx: Abnormal CBC    0 Result Notes  Details    Reading Physician Reading Date Result Priority   Hernando Spivey MD  [phone] 11/29/2023 STAT     Narrative & Impression  EXAMINATION:  NM PET CT FDG SKULL BASE TO MID THIGH     CLINICAL HISTORY:  B-cell lymphoproliferative disorder; assess for lymphadenopathy; Other specified abnormal findings of blood chemistry     TECHNIQUE:  11.51 mCi of F18-FDG was administered intravenously in the left antecubital fossa.  After an approximately 60 min distribution time, PET/CT images were acquired from the skull base to the mid thigh. Transmission images were acquired to correct for attenuation using a whole body low-dose CT scan without contrast with the arms positioned above the head. Glycemia at the time of injection was 147 mg/dL.     COMPARISON:  None     FINDINGS:  Quality of the study: Adequate.     SUV max of the liver parenchyma is 2.6.  SUV max of mediastinal blood pool is 2.1.     Neck: No lymphadenopathy.  No mass or abnormal FDG avidity.     Chest: No FDG avid mediastinal, hilar, or axillary lymphadenopathy.  Clear lungs.  No pleural effusion.     Abdomen/pelvis: No ascites or lymphadenopathy or mass.  No abnormal FDG avidity.     Skeletal structures: No abnormal FDG avidity.  No focal lytic or sclerotic lesion.     Physiologic uptake of the tracer is present within the brain, salivary glands, myocardium, GI and  tracts.     Incidental CT findings: N/A     Impression:     No lymphadenopathy or abnormal FDG avidity on this exam.  Normal PET-CT.     All CT scans at this facility are performed  using dose modulation techniques as appropriate to performed exam including the following:   automated exposure control; adjustment of mA and/or kV according to the patients size (this includes techniques or standardized protocols for targeted exams where dose is matched to indication/reason for exam: i.e. extremities or head);  iterative reconstruction technique.         Assessment and Plan   CLL - Clinical Stage A  CLL-IPI Intermediate Risk (trisomy 12 in 50% of  nuclei and a 13q deletion of both chromosomes 13 in 49% of   nuclei. )  CLL FISH 11/17/23 confirmed the diagnosis; will defer bone marrow biopsy at this time  PET-CT showed no LAD   I discussed the diagnosis, indications for treatment, and significant side effects of BTK inhibitors; I previously provided her with and informational handout on her diagnosis  Labs reviewed and stable  No indication for treatment at this time (she has mild fatigue that is not function limiting and is imiproving)      Cancer Screening  MMG: Scheduled  PAP Smear: Hx hysterectomy  Colonoscopy 05/24/14: at [facility]; random bx taken for microscopic colitis and negative; colon normal; repeat 10 years - Refer to GI for C-scope and EGD given change in bowel habits as well as new UGI sx      Chronic Medical Conditions  CKD IIIa - stable; continue to monitor CMP  DM II  HLD  GERD  IBS      Med Onc Chart Routing      Follow up with physician 3 months.   Follow up with MARKO    Infusion scheduling note    Injection scheduling note    Labs CBC and CMP   Scheduling:  Preferred lab:  Lab interval:     Imaging    Pharmacy appointment    Other referrals                   The patient was seen, interviewed and examined. Pertinent lab and radiologic studies were reviewed. Pt instructed to call should they develop concerning signs/symptoms or have further questions.        Portions of the record may have been created with voice recognition software. Occasional wrong-word or sound-a-like substitutions may have occurred due to the inherent limitations of voice recognition  software. Read the chart carefully and recognize, using context, where substitutions have occurred.      Jess Verdin MD    Hematology/Oncology

## 2025-06-19 DIAGNOSIS — C91.10 CHRONIC LYMPHOCYTIC LEUKEMIA (CLL), CLINICAL STAGE A: Primary | ICD-10-CM

## 2025-07-16 ENCOUNTER — PATIENT MESSAGE (OUTPATIENT)
Dept: INTERNAL MEDICINE | Facility: CLINIC | Age: 75
End: 2025-07-16
Payer: MEDICARE

## 2025-07-16 DIAGNOSIS — K21.9 GASTROESOPHAGEAL REFLUX DISEASE, UNSPECIFIED WHETHER ESOPHAGITIS PRESENT: Primary | ICD-10-CM

## 2025-07-16 RX ORDER — OMEPRAZOLE 40 MG/1
40 CAPSULE, DELAYED RELEASE ORAL DAILY
Qty: 90 CAPSULE | Refills: 1 | Status: SHIPPED | OUTPATIENT
Start: 2025-07-16 | End: 2026-07-16

## 2025-07-21 ENCOUNTER — OFFICE VISIT (OUTPATIENT)
Dept: OPHTHALMOLOGY | Facility: CLINIC | Age: 75
End: 2025-07-21
Payer: MEDICARE

## 2025-07-21 DIAGNOSIS — E11.65 UNCONTROLLED TYPE 2 DIABETES MELLITUS WITH HYPERGLYCEMIA: ICD-10-CM

## 2025-07-21 DIAGNOSIS — Z96.1 PSEUDOPHAKIA OF BOTH EYES: ICD-10-CM

## 2025-07-21 DIAGNOSIS — E11.9 DIABETES MELLITUS TYPE 2 WITHOUT RETINOPATHY: Primary | ICD-10-CM

## 2025-07-21 PROCEDURE — 2023F DILAT RTA XM W/O RTNOPTHY: CPT | Mod: CPTII,S$GLB,, | Performed by: OPHTHALMOLOGY

## 2025-07-21 PROCEDURE — 99999 PR PBB SHADOW E&M-EST. PATIENT-LVL II: CPT | Mod: PBBFAC,,, | Performed by: OPHTHALMOLOGY

## 2025-07-21 PROCEDURE — 1159F MED LIST DOCD IN RCRD: CPT | Mod: CPTII,S$GLB,, | Performed by: OPHTHALMOLOGY

## 2025-07-21 PROCEDURE — 92014 COMPRE OPH EXAM EST PT 1/>: CPT | Mod: S$GLB,,, | Performed by: OPHTHALMOLOGY

## 2025-07-21 PROCEDURE — 3044F HG A1C LEVEL LT 7.0%: CPT | Mod: CPTII,S$GLB,, | Performed by: OPHTHALMOLOGY

## 2025-07-21 PROCEDURE — 1160F RVW MEDS BY RX/DR IN RCRD: CPT | Mod: CPTII,S$GLB,, | Performed by: OPHTHALMOLOGY

## 2025-07-21 NOTE — PROGRESS NOTES
HPI     Diabetic Eye Exam     Additional comments: Sees PCP every mos for monitoring A1c:6.6 .   Pt present today for IIDM          Last edited by Nakul Freeman on 7/21/2025 11:08 AM.            Assessment /Plan     For exam results, see Encounter Report.    Diabetes mellitus type 2 without retinopathy  Last A1c 6.6 . Diabetes controlled with no diabetic retinopathy on dilated exam. Reviewed diabetic eye precautions including excellent blood sugar control, and importance of regular follow up.     Pseudophakia of both eyes  Condition stable, no therapeutic intervention necessary at this time. Will continue to monitor.      Return to clinic in 1 year or sooner PRN

## 2025-08-21 ENCOUNTER — PATIENT MESSAGE (OUTPATIENT)
Dept: INTERNAL MEDICINE | Facility: CLINIC | Age: 75
End: 2025-08-21
Payer: MEDICARE

## (undated) DEVICE — BAND TR COMP DEVICE REG 24CM

## (undated) DEVICE — WIRE GUIDE TEFLON 3CM .035 145

## (undated) DEVICE — CATH OPTITORQUE RADIAL 5FR

## (undated) DEVICE — KIT SYR REUSABLE

## (undated) DEVICE — KIT MANIFOLD LOW PRESS TUBING

## (undated) DEVICE — KIT GLIDESHEATH SLEND 6FR 10CM

## (undated) DEVICE — CATH PIG145 INFINITI 5X110CM

## (undated) DEVICE — OMNIPAQUE 300MG 150ML VIAL

## (undated) DEVICE — SHEET THYROID W/ISO-BAC

## (undated) DEVICE — DRAPE ANGIO BRACH 38X44IN

## (undated) DEVICE — ANGIOTOUCH KIT

## (undated) DEVICE — PACK HEART CATH BR